# Patient Record
Sex: MALE | Race: BLACK OR AFRICAN AMERICAN | Employment: OTHER | URBAN - METROPOLITAN AREA
[De-identification: names, ages, dates, MRNs, and addresses within clinical notes are randomized per-mention and may not be internally consistent; named-entity substitution may affect disease eponyms.]

---

## 2017-03-27 DIAGNOSIS — I10 ESSENTIAL HYPERTENSION WITH GOAL BLOOD PRESSURE LESS THAN 140/90: ICD-10-CM

## 2017-03-27 NOTE — TELEPHONE ENCOUNTER
Requested Prescriptions     Pending Prescriptions Disp Refills    lisinopril (PRINIVIL, ZESTRIL) 20 mg tablet 135 Tab 0     Sig: Take one and a half tablet daily    hydroCHLOROthiazide (HYDRODIURIL) 12.5 mg tablet 90 Tab 1     Sig: TAKE 1 TABLET BY MOUTH DAILY FOR HIGH BLOOD PRESSURE

## 2017-03-29 DIAGNOSIS — I10 ESSENTIAL HYPERTENSION WITH GOAL BLOOD PRESSURE LESS THAN 140/90: ICD-10-CM

## 2017-03-29 RX ORDER — LISINOPRIL 20 MG/1
TABLET ORAL
Qty: 135 TAB | Refills: 0 | OUTPATIENT
Start: 2017-03-29

## 2017-03-29 NOTE — TELEPHONE ENCOUNTER
Patient states he needs these refills approved today as he requested this on Monday, 3/27/17. Patient states he needs a call when this is done to the pharmacy. Please call.  Thank you

## 2017-03-29 NOTE — TELEPHONE ENCOUNTER
Pt has been out of medication and ask if these can be sent to the pharmacy as soon as possible this morning?

## 2017-03-30 RX ORDER — LISINOPRIL 20 MG/1
TABLET ORAL
Qty: 135 TAB | Refills: 1 | Status: SHIPPED | OUTPATIENT
Start: 2017-03-30 | End: 2018-08-29 | Stop reason: SDUPTHER

## 2017-03-30 RX ORDER — LISINOPRIL 20 MG/1
TABLET ORAL
Qty: 135 TAB | Refills: 0 | Status: SHIPPED | OUTPATIENT
Start: 2017-03-30 | End: 2017-03-30 | Stop reason: SDUPTHER

## 2017-03-30 RX ORDER — HYDROCHLOROTHIAZIDE 12.5 MG/1
TABLET ORAL
Qty: 90 TAB | Refills: 1 | Status: SHIPPED | OUTPATIENT
Start: 2017-03-30 | End: 2017-08-11 | Stop reason: SDUPTHER

## 2017-03-30 RX ORDER — HYDROCHLOROTHIAZIDE 12.5 MG/1
TABLET ORAL
Qty: 90 TAB | Refills: 3 | Status: SHIPPED | OUTPATIENT
Start: 2017-03-30 | End: 2017-08-11

## 2017-03-30 NOTE — TELEPHONE ENCOUNTER
Requested Prescriptions     Pending Prescriptions Disp Refills    lisinopril (PRINIVIL, ZESTRIL) 20 mg tablet 135 Tab 0     Sig: Take one and a half tablet daily     Refused Prescriptions Disp Refills    lisinopril (PRINIVIL, ZESTRIL) 20 mg tablet [Pharmacy Med Name: LISINOPRIL 20MG TABLETS] 135 Tab 0     Sig: TAKE 1 AND ONE-HALF TABLET BY MOUTH ONCE DAILY     Refused By: Dena Dyer     Reason for Refusal: other

## 2017-06-02 ENCOUNTER — APPOINTMENT (OUTPATIENT)
Dept: GENERAL RADIOLOGY | Age: 67
End: 2017-06-02
Attending: EMERGENCY MEDICINE
Payer: MEDICARE

## 2017-06-02 ENCOUNTER — HOSPITAL ENCOUNTER (EMERGENCY)
Age: 67
Discharge: HOME | End: 2017-06-02
Attending: EMERGENCY MEDICINE
Payer: MEDICARE

## 2017-06-02 VITALS
BODY MASS INDEX: 25.74 KG/M2 | TEMPERATURE: 98.6 F | DIASTOLIC BLOOD PRESSURE: 55 MMHG | WEIGHT: 183.86 LBS | SYSTOLIC BLOOD PRESSURE: 128 MMHG | RESPIRATION RATE: 14 BRPM | OXYGEN SATURATION: 99 % | HEART RATE: 48 BPM | HEIGHT: 71 IN

## 2017-06-02 DIAGNOSIS — R10.13 ABDOMINAL PAIN, EPIGASTRIC: ICD-10-CM

## 2017-06-02 DIAGNOSIS — R07.89 OTHER CHEST PAIN: Primary | ICD-10-CM

## 2017-06-02 DIAGNOSIS — R10.13 DYSPEPSIA: ICD-10-CM

## 2017-06-02 LAB
ALBUMIN SERPL BCP-MCNC: 3.7 G/DL (ref 3.5–5)
ALBUMIN/GLOB SERPL: 1.1 {RATIO} (ref 1.1–2.2)
ALP SERPL-CCNC: 79 U/L (ref 45–117)
ALT SERPL-CCNC: 20 U/L (ref 12–78)
ANION GAP BLD CALC-SCNC: 8 MMOL/L (ref 5–15)
APPEARANCE UR: CLEAR
AST SERPL W P-5'-P-CCNC: 13 U/L (ref 15–37)
ATRIAL RATE: 78 BPM
BACTERIA URNS QL MICRO: NEGATIVE /HPF
BASOPHILS # BLD AUTO: 0 K/UL (ref 0–0.1)
BASOPHILS # BLD: 0 % (ref 0–1)
BILIRUB SERPL-MCNC: 0.3 MG/DL (ref 0.2–1)
BILIRUB UR QL: NEGATIVE
BUN SERPL-MCNC: 30 MG/DL (ref 6–20)
BUN/CREAT SERPL: 21 (ref 12–20)
CALCIUM SERPL-MCNC: 9.2 MG/DL (ref 8.5–10.1)
CALCULATED P AXIS, ECG09: 65 DEGREES
CALCULATED R AXIS, ECG10: 2 DEGREES
CALCULATED T AXIS, ECG11: 38 DEGREES
CHLORIDE SERPL-SCNC: 108 MMOL/L (ref 97–108)
CK SERPL-CCNC: 189 U/L (ref 39–308)
CK SERPL-CCNC: 214 U/L (ref 39–308)
CO2 SERPL-SCNC: 24 MMOL/L (ref 21–32)
COLOR UR: NORMAL
CREAT SERPL-MCNC: 1.44 MG/DL (ref 0.7–1.3)
DIAGNOSIS, 93000: NORMAL
EOSINOPHIL # BLD: 0.3 K/UL (ref 0–0.4)
EOSINOPHIL NFR BLD: 5 % (ref 0–7)
EPITH CASTS URNS QL MICRO: NORMAL /LPF
ERYTHROCYTE [DISTWIDTH] IN BLOOD BY AUTOMATED COUNT: 12.5 % (ref 11.5–14.5)
GLOBULIN SER CALC-MCNC: 3.4 G/DL (ref 2–4)
GLUCOSE SERPL-MCNC: 97 MG/DL (ref 65–100)
GLUCOSE UR STRIP.AUTO-MCNC: NEGATIVE MG/DL
HCT VFR BLD AUTO: 33.2 % (ref 36.6–50.3)
HGB BLD-MCNC: 11.1 G/DL (ref 12.1–17)
HGB UR QL STRIP: NEGATIVE
HYALINE CASTS URNS QL MICRO: NORMAL /LPF (ref 0–5)
KETONES UR QL STRIP.AUTO: NEGATIVE MG/DL
LEUKOCYTE ESTERASE UR QL STRIP.AUTO: NEGATIVE
LIPASE SERPL-CCNC: 190 U/L (ref 73–393)
LYMPHOCYTES # BLD AUTO: 45 % (ref 12–49)
LYMPHOCYTES # BLD: 3.1 K/UL (ref 0.8–3.5)
MCH RBC QN AUTO: 32.9 PG (ref 26–34)
MCHC RBC AUTO-ENTMCNC: 33.4 G/DL (ref 30–36.5)
MCV RBC AUTO: 98.5 FL (ref 80–99)
MONOCYTES # BLD: 0.7 K/UL (ref 0–1)
MONOCYTES NFR BLD AUTO: 10 % (ref 5–13)
NEUTS SEG # BLD: 2.7 K/UL (ref 1.8–8)
NEUTS SEG NFR BLD AUTO: 40 % (ref 32–75)
NITRITE UR QL STRIP.AUTO: NEGATIVE
P-R INTERVAL, ECG05: 156 MS
PH UR STRIP: 5 [PH] (ref 5–8)
PLATELET # BLD AUTO: 243 K/UL (ref 150–400)
POTASSIUM SERPL-SCNC: 3.8 MMOL/L (ref 3.5–5.1)
PROT SERPL-MCNC: 7.1 G/DL (ref 6.4–8.2)
PROT UR STRIP-MCNC: NEGATIVE MG/DL
Q-T INTERVAL, ECG07: 384 MS
QRS DURATION, ECG06: 110 MS
QTC CALCULATION (BEZET), ECG08: 437 MS
RBC # BLD AUTO: 3.37 M/UL (ref 4.1–5.7)
RBC #/AREA URNS HPF: NORMAL /HPF (ref 0–5)
SODIUM SERPL-SCNC: 140 MMOL/L (ref 136–145)
SP GR UR REFRACTOMETRY: 1.02 (ref 1–1.03)
TROPONIN I SERPL-MCNC: <0.04 NG/ML
TROPONIN I SERPL-MCNC: <0.04 NG/ML
UA: UC IF INDICATED,UAUC: NORMAL
UROBILINOGEN UR QL STRIP.AUTO: 0.2 EU/DL (ref 0.2–1)
VENTRICULAR RATE, ECG03: 78 BPM
WBC # BLD AUTO: 6.7 K/UL (ref 4.1–11.1)
WBC URNS QL MICRO: NORMAL /HPF (ref 0–4)

## 2017-06-02 PROCEDURE — 84484 ASSAY OF TROPONIN QUANT: CPT | Performed by: EMERGENCY MEDICINE

## 2017-06-02 PROCEDURE — 83690 ASSAY OF LIPASE: CPT | Performed by: EMERGENCY MEDICINE

## 2017-06-02 PROCEDURE — 99285 EMERGENCY DEPT VISIT HI MDM: CPT

## 2017-06-02 PROCEDURE — 82550 ASSAY OF CK (CPK): CPT | Performed by: EMERGENCY MEDICINE

## 2017-06-02 PROCEDURE — 80053 COMPREHEN METABOLIC PANEL: CPT | Performed by: EMERGENCY MEDICINE

## 2017-06-02 PROCEDURE — 81001 URINALYSIS AUTO W/SCOPE: CPT | Performed by: EMERGENCY MEDICINE

## 2017-06-02 PROCEDURE — 36415 COLL VENOUS BLD VENIPUNCTURE: CPT | Performed by: EMERGENCY MEDICINE

## 2017-06-02 PROCEDURE — 71010 XR CHEST PORT: CPT

## 2017-06-02 PROCEDURE — 93005 ELECTROCARDIOGRAM TRACING: CPT

## 2017-06-02 PROCEDURE — 85025 COMPLETE CBC W/AUTO DIFF WBC: CPT | Performed by: EMERGENCY MEDICINE

## 2017-06-02 RX ORDER — OMEPRAZOLE 40 MG/1
40 CAPSULE, DELAYED RELEASE ORAL DAILY
Qty: 20 CAP | Refills: 0 | Status: SHIPPED | OUTPATIENT
Start: 2017-06-02 | End: 2017-08-11 | Stop reason: ALTCHOICE

## 2017-06-02 NOTE — ED PROVIDER NOTES
HPI Comments: Nasir Walters is a 69 y.o. Male, with a pertinent PMHx of HTN, who presents ambulatory to the ED c/o left sided chest pressure since 8AM yesterday. Pt notes associated symptoms of tingling of his R fingers but reports he had experienced this before and nothing provides relief or exacerbates it. The pt states his CP lasts for 30 minutes at a time and now it feels similar to indigestion. He states the sensation is similar to an incomplete belch. The pt reports a h/o a bulging disc in his lower back. Pt denies a h/o carpal tunnel but does not some repetitive work with his hands. He denies h/o pancreatitis. Pt specifically denies SOB. 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 He wondered if some of this was from his fall. Social hx: + Tobacco use, + EtOH use, - Illicit drug use    PCP: Jagdeep Colby MD  Cardiology: None    There are no other complaints, changes or physical findings at this time. The history is provided by the patient. No  was used. Past Medical History:   Diagnosis Date    Arthritis     DJD (degenerative joint disease) of hip     Hypertension        Past Surgical History:   Procedure Laterality Date    HX ORTHOPAEDIC  2004    Total R hip replacement         Family History:   Problem Relation Age of Onset    Hypertension Mother     Coronary Artery Disease Maternal Grandmother     Coronary Artery Disease Maternal Grandfather     Hypertension Sister        Social History     Social History    Marital status: SINGLE     Spouse name: N/A    Number of children: N/A    Years of education: N/A     Occupational History    Not on file. Social History Main Topics    Smoking status: Current Every Day Smoker     Packs/day: 1.00     Years: 20.00    Smokeless tobacco: Never Used    Alcohol use Yes      Comment: occasionally cachorro    Drug use:  No  Sexual activity: Not on file     Other Topics Concern    Not on file     Social History Narrative         ALLERGIES: Review of patient's allergies indicates no known allergies. Review of Systems   Constitutional: Negative for chills and fever. HENT: Negative. Negative for congestion, rhinorrhea, sneezing and sore throat. Eyes: Negative. Negative for redness and visual disturbance. Respiratory: Negative. Negative for cough, shortness of breath and wheezing. Cardiovascular: Positive for chest pain (L). Negative for leg swelling. Gastrointestinal: Negative. Negative for abdominal pain, diarrhea, nausea and vomiting. Genitourinary: Negative. Negative for difficulty urinating, discharge and frequency. Musculoskeletal: Negative. Negative for arthralgias, back pain, myalgias and neck stiffness. Skin: Negative. Negative for color change and rash. Neurological: Negative. Negative for dizziness, syncope, weakness, numbness and headaches. +tingling R fingers   Hematological: Negative for adenopathy. Psychiatric/Behavioral: Negative. All other systems reviewed and are negative. Patient Vitals for the past 12 hrs:   Temp Pulse Resp BP SpO2   06/02/17 0525 - (!) 48 14 - 99 %   06/02/17 0500 - (!) 53 14 128/55 100 %   06/02/17 0445 - (!) 55 13 113/56 99 %   06/02/17 0430 - (!) 54 12 115/62 98 %   06/02/17 0415 - (!) 53 17 107/57 99 %   06/02/17 0400 - (!) 52 16 117/60 98 %   06/02/17 0345 - (!) 50 15 103/63 98 %   06/02/17 0330 - (!) 59 16 109/58 99 %   06/02/17 0315 - 60 15 128/78 99 %   06/02/17 0300 - (!) 59 16 113/66 99 %   06/02/17 0245 - 62 13 115/74 99 %   06/02/17 0230 - 62 13 125/70 99 %   06/02/17 0215 - (!) 59 14 125/70 99 %   06/02/17 0200 - 76 15 140/68 99 %   06/02/17 0157 - - - - 99 %   06/02/17 0156 - - - 139/67 -   06/02/17 0151 98.6 °F (37 °C) 72 14 139/67 99 %            Physical Exam   Constitutional: He is oriented to person, place, and time.    HENT:   Head: Atraumatic. Eyes: EOM are normal.   Cardiovascular: Normal rate, regular rhythm, normal heart sounds and intact distal pulses. Exam reveals no gallop and no friction rub. No murmur heard. Pulmonary/Chest: Effort normal and breath sounds normal. No respiratory distress. He has no wheezes. He has no rales. He exhibits no tenderness. Abdominal: Soft. Bowel sounds are normal. He exhibits no distension and no mass. There is no tenderness. There is no rebound and no guarding. Musculoskeletal: Normal range of motion. He exhibits no edema or tenderness. Negative Tinnels, Mild positive phalens   Neurological: He is alert and oriented to person, place, and time. Psychiatric: He has a normal mood and affect. Nursing note and vitals reviewed. MDM  Number of Diagnoses or Management Options  Abdominal pain, epigastric:   Dyspepsia:   Other chest pain:   Diagnosis management comments: DDx: Lower suspicion for ACS, acute MI, as presentation slightly atypical, however willl obtain a two set cardiac enzyme rule out; pt also ttp over epigastric region so gastritis, PUD, symptomatic GERD will also be considered, 2 set cardiac enzyme will be performed       Amount and/or Complexity of Data Reviewed  Clinical lab tests: ordered and reviewed  Tests in the radiology section of CPT®: ordered and reviewed  Tests in the medicine section of CPT®: ordered and reviewed  Review and summarize past medical records: yes  Independent visualization of images, tracings, or specimens: yes    Patient Progress  Patient progress: stable    ED Course       Procedures    EKG interpretation: (Preliminary) 1:48 AM  Rhythm: normal sinus rhythm; and regular . Rate (approx.): 78 bpm; Axis: normal; CA interval: normal; QRS interval: normal ; ST/T wave: normal; Other findings: normal ECG. This note is prepared by Patito Martinez, acting as Scribe for Romina Ryder MD.    PROGRESS NOTE:  4:04 AM  Pt has been re-evaluated.  Romina Ryder MD will repeat enzymes at 4:30 AM.    PROGRESS NOTE:  5:34 AM  Pt has been re-evaluated. Patient is asymptomatic at time of discharge. LABORATORY TESTS:  Recent Results (from the past 12 hour(s))   EKG, 12 LEAD, INITIAL    Collection Time: 06/02/17  1:48 AM   Result Value Ref Range    Ventricular Rate 78 BPM    Atrial Rate 78 BPM    P-R Interval 156 ms    QRS Duration 110 ms    Q-T Interval 384 ms    QTC Calculation (Bezet) 437 ms    Calculated P Axis 65 degrees    Calculated R Axis 2 degrees    Calculated T Axis 38 degrees    Diagnosis       Normal sinus rhythm  Normal ECG  When compared with ECG of 15-APR-2014 14:05,  Nonspecific T wave abnormality, improved in Lateral leads     CBC WITH AUTOMATED DIFF    Collection Time: 06/02/17  2:15 AM   Result Value Ref Range    WBC 6.7 4.1 - 11.1 K/uL    RBC 3.37 (L) 4.10 - 5.70 M/uL    HGB 11.1 (L) 12.1 - 17.0 g/dL    HCT 33.2 (L) 36.6 - 50.3 %    MCV 98.5 80.0 - 99.0 FL    MCH 32.9 26.0 - 34.0 PG    MCHC 33.4 30.0 - 36.5 g/dL    RDW 12.5 11.5 - 14.5 %    PLATELET 099 492 - 985 K/uL    NEUTROPHILS 40 32 - 75 %    LYMPHOCYTES 45 12 - 49 %    MONOCYTES 10 5 - 13 %    EOSINOPHILS 5 0 - 7 %    BASOPHILS 0 0 - 1 %    ABS. NEUTROPHILS 2.7 1.8 - 8.0 K/UL    ABS. LYMPHOCYTES 3.1 0.8 - 3.5 K/UL    ABS. MONOCYTES 0.7 0.0 - 1.0 K/UL    ABS. EOSINOPHILS 0.3 0.0 - 0.4 K/UL    ABS.  BASOPHILS 0.0 0.0 - 0.1 K/UL   METABOLIC PANEL, COMPREHENSIVE    Collection Time: 06/02/17  2:15 AM   Result Value Ref Range    Sodium 140 136 - 145 mmol/L    Potassium 3.8 3.5 - 5.1 mmol/L    Chloride 108 97 - 108 mmol/L    CO2 24 21 - 32 mmol/L    Anion gap 8 5 - 15 mmol/L    Glucose 97 65 - 100 mg/dL    BUN 30 (H) 6 - 20 MG/DL    Creatinine 1.44 (H) 0.70 - 1.30 MG/DL    BUN/Creatinine ratio 21 (H) 12 - 20      GFR est AA 59 (L) >60 ml/min/1.73m2    GFR est non-AA 49 (L) >60 ml/min/1.73m2    Calcium 9.2 8.5 - 10.1 MG/DL    Bilirubin, total 0.3 0.2 - 1.0 MG/DL    ALT (SGPT) 20 12 - 78 U/L    AST (SGOT) 13 (L) 15 - 37 U/L    Alk. phosphatase 79 45 - 117 U/L    Protein, total 7.1 6.4 - 8.2 g/dL    Albumin 3.7 3.5 - 5.0 g/dL    Globulin 3.4 2.0 - 4.0 g/dL    A-G Ratio 1.1 1.1 - 2.2     URINALYSIS W/ REFLEX CULTURE    Collection Time: 06/02/17  2:15 AM   Result Value Ref Range    Color YELLOW/STRAW      Appearance CLEAR CLEAR      Specific gravity 1.016 1.003 - 1.030      pH (UA) 5.0 5.0 - 8.0      Protein NEGATIVE  NEG mg/dL    Glucose NEGATIVE  NEG mg/dL    Ketone NEGATIVE  NEG mg/dL    Bilirubin NEGATIVE  NEG      Blood NEGATIVE  NEG      Urobilinogen 0.2 0.2 - 1.0 EU/dL    Nitrites NEGATIVE  NEG      Leukocyte Esterase NEGATIVE  NEG      UA:UC IF INDICATED CULTURE NOT INDICATED BY UA RESULT CNI      WBC 0-4 0 - 4 /hpf    RBC 0-5 0 - 5 /hpf    Epithelial cells FEW FEW /lpf    Bacteria NEGATIVE  NEG /hpf    Hyaline cast 2-5 0 - 5 /lpf   TROPONIN I    Collection Time: 06/02/17  2:15 AM   Result Value Ref Range    Troponin-I, Qt. <0.04 <0.05 ng/mL   CK W/ REFLX CKMB    Collection Time: 06/02/17  2:15 AM   Result Value Ref Range     39 - 308 U/L   TROPONIN I    Collection Time: 06/02/17  4:23 AM   Result Value Ref Range    Troponin-I, Qt. <0.04 <0.05 ng/mL   CK W/ REFLX CKMB    Collection Time: 06/02/17  4:23 AM   Result Value Ref Range     39 - 308 U/L   LIPASE    Collection Time: 06/02/17  4:23 AM   Result Value Ref Range    Lipase 190 73 - 393 U/L       IMAGING RESULTS:  CXR Results  (Last 48 hours)               06/02/17 0229  XR CHEST PORT Final result    Impression:  Impression: No acute process. Narrative: Indication: Left-sided chest pain since yesterday       Comparison: 4/15/2014       Portable exam of the chest obtained at 221 demonstrates normal heart size. There   is no acute process in the lung fields. The osseous structures are unremarkable. IMPRESSION:  1. Other chest pain    2. Abdominal pain, epigastric    3. Dyspepsia        PLAN:  1.  Discharge home  Current Discharge Medication List      START taking these medications    Details   omeprazole (PRILOSEC) 40 mg capsule Take 1 Cap by mouth daily. Qty: 20 Cap, Refills: 0           2. Follow-up Information     Follow up With Details Comments Contact Shaggy Austin MD In 3 days  [address] [facility]  [address] [facility] [address]  [address] [facility] [address]      Blake Knowles MD Call today and make an appointment for early next week for possible Stress Test [address] [facility]  [phone]      [facility] EMERGENCY DEPT  As needed, If symptoms worsen [address]  [address] [facility]  [phone]        Return to ED if worse     DISCHARGE NOTE  5:38 AM  The patient has been re-evaluated and is ready for discharge. Reviewed available results with patient. Counseled patient on diagnosis and care plan. Patient has expressed understanding, and all questions have been answered. Patient agrees with plan and agrees to follow up as recommended, or return to the ED if their symptoms worsen. Discharge instructions have been provided and explained to the patient, along with reasons to return to the ED. ATTESTATION:  This note is prepared by Miriam Wall, acting as Scribe for Faith Hoko MD.    Faith Hook MD: The scribe's documentation has been prepared under my direction and personally reviewed by me in its entirety. I confirm that the note above accurately reflects all work, treatment, procedures, and medical decision making performed by me.

## 2017-06-02 NOTE — DISCHARGE INSTRUCTIONS
Indigestion (Dyspepsia or Heartburn): Care Instructions  Your Care Instructions  Sometimes it can be hard to pinpoint the cause of indigestion (dyspepsia or heartburn). Most cases of an upset stomach with bloating, burning, burping, and nausea are minor and go away within several hours. Home treatment and over-the-counter medicine often are able to control symptoms. But if you take medicine to relieve your indigestion without making diet and lifestyle changes, your symptoms are likely to return again and again. If you get indigestion often, it may be a sign of a more serious medical problem. Be sure to follow up with your doctor, who may want to do tests to be sure of the cause of your indigestion. Follow-up care is a key part of your treatment and safety. Be sure to make and go to all appointments, and call your doctor if you are having problems. It's also a good idea to know your test results and keep a list of the medicines you take. How can you care for yourself at home? · Your doctor may recommend over-the-counter medicine. For mild or occasional indigestion, antacids such as Tums, Gaviscon, Mylanta, or Maalox may help. Be careful when you take over-the-counter antacid medicines. Many of these medicines have aspirin in them. Read the label to make sure that you are not taking more than the recommended dose. Too much aspirin can be harmful. · Your doctor also may recommend over-the-counter acid reducers, such as Pepcid AC, Tagamet HB, Zantac 75, or Prilosec. Read and follow all instructions on the label. If you use these medicines often, talk with your doctor. · Change your eating habits. ¨ It's best to eat several small meals instead of two or three large meals. ¨ After you eat, wait 2 to 3 hours before you lie down. ¨ Chocolate, mint, and alcohol can make GERD worse.   ¨ Spicy foods, foods that have a lot of acid (like tomatoes and oranges), and coffee can make GERD symptoms worse in some people. If your symptoms are worse after you eat a certain food, you may want to stop eating that food to see if your symptoms get better. · Do not smoke or chew tobacco. Smoking can make GERD worse. If you need help quitting, talk to your doctor about stop-smoking programs and medicines. These can increase your chances of quitting for good. · If you have GERD symptoms at night, raise the head of your bed 6 to 8 inches by putting the frame on blocks or placing a foam wedge under the head of your mattress. (Adding extra pillows does not work.)  · Do not wear tight clothing around your middle. · Lose weight if you need to. Losing just 5 to 10 pounds can help. · Do not take anti-inflammatory medicines, such as aspirin, ibuprofen (Advil, Motrin), or naproxen (Aleve). These can irritate the stomach. If you need a pain medicine, try acetaminophen (Tylenol), which does not cause stomach upset. When should you call for help? Call 911 anytime you think you may need emergency care. For example, call if:  · You passed out (lost consciousness). · You vomit blood or what looks like coffee grounds. · You pass maroon or very bloody stools. · You have chest pain or pressure. This may occur with:  ¨ Sweating. ¨ Shortness of breath. ¨ Nausea or vomiting. ¨ Pain that spreads from the chest to the neck, jaw, or one or both shoulders or arms. ¨ Feeling dizzy or lightheaded. ¨ A fast or uneven pulse. After calling 911, chew 1 adult-strength aspirin. Wait for an ambulance. Do not try to drive yourself. Call your doctor now or seek immediate medical care if:  · You have severe belly pain. · Your stools are black and tarlike or have streaks of blood. · You have trouble swallowing. · You are losing weight and do not know why. Watch closely for changes in your health, and be sure to contact your doctor if:  · You do not get better as expected. Where can you learn more?   Go to http://darryl-titus.info/. Enter E605 in the search box to learn more about \"Indigestion (Dyspepsia or Heartburn): Care Instructions. \"  Current as of: November 16, 2016  Content Version: 11.2  © 8435-8669 [employer] Care instructions adapted under license by [employer] (which disclaims liability or warranty for this information). If you have questions about a medical condition or this instruction, always ask your healthcare professional. [place] [address] any warranty or liability for your use of this information. Abdominal Pain: Care Instructions  Your Care Instructions    Abdominal pain has many possible causes. Some aren't serious and get better on their own in a few days. Others need more testing and treatment. If your pain continues or gets worse, you need to be rechecked and may need more tests to find out what is wrong. You may need surgery to correct the problem. Don't ignore new symptoms, such as fever, nausea and vomiting, urination problems, pain that gets worse, and dizziness. These may be signs of a more serious problem. Your doctor may have recommended a follow-up visit in the next 8 to 12 hours. If you are not getting better, you may need more tests or treatment. The doctor has checked you carefully, but problems can develop later. If you notice any problems or new symptoms, get medical treatment right away. Follow-up care is a key part of your treatment and safety. Be sure to make and go to all appointments, and call your doctor if you are having problems. It's also a good idea to know your test results and keep a list of the medicines you take. How can you care for yourself at home? · Rest until you feel better. · To prevent dehydration, drink plenty of fluids, enough so that your urine is light yellow or clear like water. Choose water and other caffeine-free clear liquids until you feel better.  If you have kidney, heart, or liver disease and have to limit fluids, talk with your doctor before you increase the amount of fluids you drink. · If your stomach is upset, eat mild foods, such as rice, dry toast or crackers, bananas, and applesauce. Try eating several small meals instead of two or three large ones. · Wait until 48 hours after all symptoms have gone away before you have spicy foods, alcohol, and drinks that contain caffeine. · Do not eat foods that are high in fat. · Avoid anti-inflammatory medicines such as aspirin, ibuprofen (Advil, Motrin), and naproxen (Aleve). These can cause stomach upset. Talk to your doctor if you take daily aspirin for another health problem. When should you call for help? Call 911 anytime you think you may need emergency care. For example, call if:  · You passed out (lost consciousness). · You pass maroon or very bloody stools. · You vomit blood or what looks like coffee grounds. · You have new, severe belly pain. Call your doctor now or seek immediate medical care if:  · Your pain gets worse, especially if it becomes focused in one area of your belly. · You have a new or higher fever. · Your stools are black and look like tar, or they have streaks of blood. · You have unexpected vaginal bleeding. · You have symptoms of a urinary tract infection. These may include:  ¨ Pain when you urinate. ¨ Urinating more often than usual.  ¨ Blood in your urine. · You are dizzy or lightheaded, or you feel like you may faint. Watch closely for changes in your health, and be sure to contact your doctor if:  · You are not getting better after 1 day (24 hours). Where can you learn more? Go to http://darryl-titus.info/. Enter K522 in the search box to learn more about \"Abdominal Pain: Care Instructions. \"  Current as of: May 27, 2016  Content Version: 11.2  © 0364-3594 [employer].  Care instructions adapted under license by [employer] (which disclaims liability or warranty for this information). If you have questions about a medical condition or this instruction, always ask your healthcare professional. [address] any warranty or liability for your use of this information. Chest Pain: Care Instructions  Your Care Instructions  There are many things that can cause chest pain. Some are not serious and will get better on their own in a few days. But some kinds of chest pain need more testing and treatment. Your doctor may have recommended a follow-up visit in the next 8 to 12 hours. If you are not getting better, you may need more tests or treatment. Even though your doctor has released you, you still need to watch for any problems. The doctor carefully checked you, but sometimes problems can develop later. If you have new symptoms or if your symptoms do not get better, get medical care right away. If you have worse or different chest pain or pressure that lasts more than 5 minutes or you passed out (lost consciousness), call 911 or seek other emergency help right away. A medical visit is only one step in your treatment. Even if you feel better, you still need to do what your doctor recommends, such as going to all suggested follow-up appointments and taking medicines exactly as directed. This will help you recover and help prevent future problems. How can you care for yourself at home? · Rest until you feel better. · Take your medicine exactly as prescribed. Call your doctor if you think you are having a problem with your medicine. · Do not drive after taking a prescription pain medicine. When should you call for help? Call 911 if:  · You passed out (lost consciousness). · You have severe difficulty breathing. · You have symptoms of a heart attack. These may include:  ¨ Chest pain or pressure, or a strange feeling in your chest.  ¨ Sweating. ¨ Shortness of breath. ¨ Nausea or vomiting.   ¨ Pain, pressure, or a strange feeling in your back, neck, jaw, or upper belly or in one or both shoulders or arms. ¨ Lightheadedness or sudden weakness. ¨ A fast or irregular heartbeat. After you call 911, the  may tell you to chew 1 adult-strength or 2 to 4 low-dose aspirin. Wait for an ambulance. Do not try to drive yourself. Call your doctor today if:  · You have any trouble breathing. · Your chest pain gets worse. · You are dizzy or lightheaded, or you feel like you may faint. · You are not getting better as expected. · You are having new or different chest pain. Where can you learn more? Go to http://darryl-titus.info/. Enter A120 in the search box to learn more about \"Chest Pain: Care Instructions. \"  Current as of: May 27, 2016  Content Version: 11.2  © 4214-6801 [employer]. Care instructions adapted under license by [employer] (which disclaims liability or warranty for this information). If you have questions about a medical condition or this instruction, always ask your healthcare professional. [place] [address] any warranty or liability for your use of this information.

## 2017-06-02 NOTE — ED NOTES
Pt arrives via wheelchair to room. Pt c/o chest pain and epigastric pain off and on since thursday am. Pt denies n/v, sweating or shortness of breath. Monitor x 3. Call bell within reach and plan of care discussed. Dr. Marcelina Summers in to see pt.

## 2017-06-02 NOTE — ED NOTES
Assumed care of pt at this time, received bedside report from [facility] with pt included in care. Pt is resting in room, with call bell in reach. All questions answered.

## 2017-06-02 NOTE — ED NOTES
Discharge instructions reviewed with pt and copy given along with RX by MD. All questions answered at this time. Pt discharged ambulatory to home. Pt refused wheelchair.

## 2017-06-14 NOTE — TELEPHONE ENCOUNTER
Pt called requesting a refill for blood pressure medication Metoprolol 25 mg tabs. Pt stated he has only  2 tablets left. Pt use the [facility] on [facility] [address]. Pt best contact number is [phone].      Message received & copied from [facility]

## 2017-06-16 NOTE — TELEPHONE ENCOUNTER
Patient called to check status of refill request received on 6/14/17 on medication that patient states he's out of. Patient reminded of 48 hr turn around on refill request. Please approve as soon as possible.  Thank you

## 2017-06-16 NOTE — TELEPHONE ENCOUNTER
Patient called to check the status of his refill request received 6/14/17 being approved today. Please call to advise.  Thank you

## 2017-06-17 RX ORDER — METOPROLOL SUCCINATE 25 MG/1
TABLET, EXTENDED RELEASE ORAL
Qty: 90 TAB | Refills: 1 | Status: SHIPPED | OUTPATIENT
Start: 2017-06-17 | End: 2017-08-11 | Stop reason: SDUPTHER

## 2017-08-11 ENCOUNTER — OFFICE VISIT (OUTPATIENT)
Dept: CARDIOLOGY CLINIC | Age: 67
End: 2017-08-11

## 2017-08-11 VITALS
WEIGHT: 185.2 LBS | DIASTOLIC BLOOD PRESSURE: 80 MMHG | HEART RATE: 64 BPM | HEIGHT: 71 IN | RESPIRATION RATE: 16 BRPM | OXYGEN SATURATION: 98 % | BODY MASS INDEX: 25.93 KG/M2 | SYSTOLIC BLOOD PRESSURE: 150 MMHG

## 2017-08-11 DIAGNOSIS — I42.8 NICM (NONISCHEMIC CARDIOMYOPATHY) (HCC): ICD-10-CM

## 2017-08-11 DIAGNOSIS — R94.30 EJECTION FRACTION < 50%: ICD-10-CM

## 2017-08-11 DIAGNOSIS — I10 ESSENTIAL HYPERTENSION: Primary | ICD-10-CM

## 2017-08-11 RX ORDER — METOPROLOL SUCCINATE 50 MG/1
TABLET, EXTENDED RELEASE ORAL
Qty: 90 TAB | Refills: 3 | Status: SHIPPED | OUTPATIENT
Start: 2017-08-11 | End: 2017-08-11 | Stop reason: SDUPTHER

## 2017-08-11 RX ORDER — METOPROLOL SUCCINATE 50 MG/1
TABLET, EXTENDED RELEASE ORAL
Qty: 90 TAB | Refills: 3 | Status: SHIPPED | OUTPATIENT
Start: 2017-08-11 | End: 2018-05-16 | Stop reason: ALTCHOICE

## 2017-08-11 NOTE — PROGRESS NOTES
Patient C/O chest discomfort with tingling sensations seen @ ED advised to follow up with cardiology.

## 2017-08-11 NOTE — MR AVS SNAPSHOT
Visit Information Date & Time Provider Department Dept. Phone Encounter #  
 8/11/2017 10:00 AM Campos Curtis MD [facility] [phone] [id number] Follow-up Instructions Routing History Your Appointments 8/17/2017 10:00 AM  
ECHO CARDIOGRAMS 2D with [facility] [address] [place] [address]) Appt Note: $40CP 8/11/17ksr /per Dr R/2D ECHO COMPLETE ADULT (TTE) W OR WO CONTR [60065 CPT(R)]  
 [address]  
[phone] [address] Upcoming Health Maintenance Date Due Hepatitis C Screening 1950 DTaP/Tdap/Td series (1 - Tdap) 2/13/1969 FOBT Q 1 YEAR AGE 50-75 2/13/1998 ZOSTER VACCINE AGE 60> 12/13/2007 GLAUCOMA SCREENING Q2Y 2/13/2013 Pneumococcal 65+ Low/Medium Risk (2 of 2 - PCV13) 11/20/2015 MEDICARE YEARLY EXAM 3/8/2017 INFLUENZA AGE 9 TO ADULT 8/1/2017 Allergies as of 8/11/2017  Review Complete On: 8/11/2017 By: Ti Farris LPN No Known Allergies Current Immunizations  Never Reviewed Name Date Influenza Vaccine 3/7/2016, 11/20/2014 Pneumococcal Polysaccharide (PPSV-23) 11/20/2014 Not reviewed this visit You Were Diagnosed With   
  
 Codes Comments Essential hypertension    -  Primary ICD-10-CM: I10 
ICD-9-CM: 401.9 Ejection fraction < 50%     ICD-10-CM: R09.89 ICD-9-CM: 785.9 NICM (nonischemic cardiomyopathy) ([facility] [address]     ICD-10-CM: I42.8 ICD-9-CM: 425.4 Vitals BP Pulse Resp Height(growth percentile) Weight(growth percentile) SpO2  
 150/80 (BP 1 Location: Right arm, BP Patient Position: Sitting) 64 16 5' 11\" (1.803 m) 185 lb 3.2 oz (84 kg) 98% BMI Smoking Status 25.83 kg/m2 Current Every Day Smoker Vitals History BMI and BSA Data Body Mass Index Body Surface Area  
 25.83 kg/m 2 2.05 m 2 Preferred Pharmacy Pharmacy Name Phone [facility] [address] [facility] [phone] Your Updated Medication List  
  
   
This list is accurate as of: 8/11/17 11:02 AM.  Always use your most recent med list.  
  
  
  
  
 lisinopril 20 mg tablet Commonly known as:  Chandu Jose Take one and a half tablet daily  
  
 metoprolol succinate 50 mg XL tablet Commonly known as:  TOPROL-XL  
TAKE 1 TABLET BY MOUTH ONCE DAILY Increased 08/11/17 Prescriptions Sent to Pharmacy Refills  
 metoprolol succinate (TOPROL-XL) 50 mg XL tablet 3 Sig: TAKE 1 TABLET BY MOUTH ONCE DAILY Increased 08/11/17 Class: Normal  
 Pharmacy: [facility] [address] [place] [address] RD AT [address] [facility] #: [phone] We Performed the Following AMB POC EKG ROUTINE W/ 12 LEADS, INTER & REP [40087 CPT(R)] To-Do List   
 Around 08/11/2017 ECHO:  2D ECHO COMPLETE ADULT (TTE) W OR WO CONTR Introducing [facility]! [facility] introduces [employer] patient portal. Now you can access parts of your medical record, email your doctor's office, and request medication refills online. 1. In your internet browser, go to [email or web address] 2. Click on the First Time User? Click Here link in the Sign In box. You will see the New Member Sign Up page. 3. Enter your [employer] Access Code exactly as it appears below. You will not need to use this code after youve completed the sign-up process. If you do not sign up before the expiration date, you must request a new code. · [employer] Access Code: [email or web address] Expires: 8/31/2017  5:40 AM 
 
4. Enter the last four digits of your Social Security Number (xxxx) and Date of Birth (mm/dd/yyyy) as indicated and click Submit. You will be taken to the next sign-up page. 5. Create a [employer] ID. This will be your [employer] login ID and cannot be changed, so think of one that is secure and easy to remember. 6. Create a [employer] password. You can change your password at any time. 7. Enter your Password Reset Question and Answer. This can be used at a later time if you forget your password. 8. Enter your e-mail address. You will receive e-mail notification when new information is available in [address] 9. Click Sign Up. You can now view and download portions of your medical record. 10. Click the Download Summary menu link to download a portable copy of your medical information. If you have questions, please visit the Frequently Asked Questions section of the [employer] website. Remember, [employer] is NOT to be used for urgent needs. For medical emergencies, dial 911. Now available from your iPhone and Android! Please provide this summary of care documentation to your next provider. Your primary care clinician is listed as [facility]. If you have any questions after today's visit, please call [phone].

## 2017-08-11 NOTE — PROGRESS NOTES
NAME:  Grazyna Colunga   :   1950   MRN:   [id number]   PCP:  Marcina Mortimer, MD           Subjective: The patient is a 69y.o. year old male  who returns for a long  follow-up, last seen  . Since the last visit, patient reports no change in exercise tolerance,  edema, medication intolerance, palpitations, shortness of breath, PND/orthopnea wheezing, sputum, syncope, dizziness or light headedness. Was evaluated in ED recent for sudden sharp pain in his chest that occurred while in the shower. He has not had any pain of that nature since that time. Some tingling in his hands while in bed at night. Rare dyspnea. Frequent nocturia with feeling sudden need to void. Past Medical History:   Diagnosis Date    Arthritis     DJD (degenerative joint disease) of hip     Hypertension        Social History   Substance Use Topics    Smoking status: Current Every Day Smoker     Packs/day: 1.00     Years: 20.00    Smokeless tobacco: Never Used      Comment: doesn't inhale    Alcohol use Yes      Comment: occasionally cachorro      Family History   Problem Relation Age of Onset    Hypertension Mother     Coronary Artery Disease Maternal Grandmother     Coronary Artery Disease Maternal Grandfather     Hypertension Sister         Review of Systems  Constitutional: Negative for fever, chills, and diaphoresis. Respiratory: Negative for cough, hemoptysis, sputum production, shortness of breath and wheezing. Cardiovascular: Negative for chest pain, palpitations, orthopnea, claudication, leg swelling and PND. Gastrointestinal: Negative for heartburn, nausea, vomiting, blood in stool and melena. Genitourinary: Negative for dysuria and flank pain. Musculoskeletal: Negative for joint pain and back pain. Skin: Negative for rash. Neurological: Negative for focal weakness, seizures, loss of consciousness, weakness and headaches. Endo/Heme/Allergies: Does not bruise/bleed easily. Psychiatric/Behavioral: Negative for memory loss. The patient does not have insomnia. Objective:       Vitals:    08/11/17 1011 08/11/17 1012   BP: 160/80 150/80   Pulse: 64    Resp: 16    SpO2: 98%    Weight: 185 lb 3.2 oz (84 kg)    Height: 5' 11\" (1.803 m)     Body mass index is 25.83 kg/(m^2). General PE    Gen: NAD     Mental Status - Alert. General Appearance - Not in acute distress. Neck - no JVD     Chest and Lung Exam     Inspection: Accessory muscles - No use of accessory muscles in breathing. Auscultation:   Breath sounds: - Normal.     Cardiovascular   Inspection: Jugular vein - Bilateral - Inspection Normal.   Palpation/Percussion:   Apical Impulse: - Normal.   Auscultation: Rhythm - Regular. Heart Sounds - S1 WNL and S2 WNL. No S3 or S4. Murmurs & Other Heart Sounds: Auscultation of the heart reveals - No Murmurs. Peripheral Vascular   Upper Extremity: Inspection - Bilateral - No Cyanotic nailbeds or Digital clubbing. Lower Extremity:   Palpation: Edema - Bilateral - No edema. Abdomen: Soft, non-tender, bowel sounds are active. Neuro: A&O times 3, CN and motor grossly WNL      Data Review:     EKG -  Sinus  Rhythm   WITHIN NORMAL LIMITS    Allergies reviewed  No Known Allergies    Medications reviewed  Current Outpatient Prescriptions   Medication Sig    metoprolol succinate (TOPROL-XL) 50 mg XL tablet TAKE 1 TABLET BY MOUTH ONCE DAILY Increased 08/11/17    lisinopril (PRINIVIL, ZESTRIL) 20 mg tablet Take one and a half tablet daily     No current facility-administered medications for this visit. Assessment:       ICD-10-CM ICD-9-CM    1. Essential hypertension I10 401.9 AMB POC EKG ROUTINE W/ 12 LEADS, INTER & REP      2D ECHO COMPLETE ADULT (TTE) W OR WO CONTR   2. Ejection fraction < 50% R09.89 785.9 2D ECHO COMPLETE ADULT (TTE) W OR WO CONTR   3.  NICM (nonischemic cardiomyopathy) (HCC) I42.8 425.4 2D ECHO COMPLETE ADULT (TTE) W OR WO CONTR Orders Placed This Encounter    AMB POC EKG ROUTINE W/ 12 LEADS, INTER & REP     Order Specific Question:   Reason for Exam:     Answer:   routine    2D ECHO COMPLETE ADULT (TTE) W OR WO CONTR     Standing Status:   Future     Standing Expiration Date:   2/11/2018     Order Specific Question:   Reason for Exam:     Answer:   NICM, EF 45%, HTN     Order Specific Question:   Contrast Enhancement (Bubble Study, Definity, Optison) may be used if criteria listed in established evidence-based protocol has been identified. Answer: Yes    DISCONTD: metoprolol succinate (TOPROL-XL) 50 mg XL tablet     Sig: TAKE 1 TABLET BY MOUTH ONCE DAILY  Increased 08/11/17     Dispense:  90 Tab     Refill:  3    metoprolol succinate (TOPROL-XL) 50 mg XL tablet     Sig: TAKE 1 TABLET BY MOUTH ONCE DAILY Increased 08/11/17     Dispense:  90 Tab     Refill:  3       Patient Active Problem List   Diagnosis Code    Abnormal EKG R94.31    DJD (degenerative joint disease) of hip M16.9    Ejection fraction < 50% R09.89    HTN (hypertension) I10       Plan:     Patient presents for follow up. 1. NICM- normal coronaries per cath 2014. Last echo 1/15 with EF 45%. Repeat echo. 2. HTN - not controlled. Will stop HCTZ with recent elevation in Bun/creat on ED visit. Recommend follow up with PCP. Increase Toprol to 50mg. 3. Low hemoglobin. Refer to PCP for evaluation/follow up. Kathleen Hope, [address] [place] [address] Rd [facility]    8/12/2017         Agree with note as outlined by  NP. I confirm findings in history and physical exam. No additional findings noted. Agree with plan as outlined above.      [address] Jaylen Sewell MD

## 2017-08-17 ENCOUNTER — TELEPHONE (OUTPATIENT)
Dept: CARDIOLOGY CLINIC | Age: 67
End: 2017-08-17

## 2017-08-17 ENCOUNTER — CLINICAL SUPPORT (OUTPATIENT)
Dept: CARDIOLOGY CLINIC | Age: 67
End: 2017-08-17

## 2017-08-17 DIAGNOSIS — I10 ESSENTIAL HYPERTENSION: ICD-10-CM

## 2017-08-17 DIAGNOSIS — R94.30 EJECTION FRACTION < 50%: ICD-10-CM

## 2017-08-17 DIAGNOSIS — I42.8 NICM (NONISCHEMIC CARDIOMYOPATHY) (HCC): ICD-10-CM

## 2017-08-18 NOTE — TELEPHONE ENCOUNTER
Verified patient with two identifiers. Spoke with pt advising him Metoprolol was changed from 25 mg daily to 50 mg daily. Pt verbalized understanding. LISA Bowser LPN        Caller: Unspecified (Yesterday,  4:04 PM)                     90 days went to his pharmacy at the last visit.

## 2017-08-18 NOTE — PROGRESS NOTES
Please let pt know that his EF is estimated in the range of 55 % to 60 %. Better than previously known. Continue same.

## 2017-10-20 ENCOUNTER — HOSPITAL ENCOUNTER (EMERGENCY)
Age: 67
Discharge: HOME | End: 2017-10-20
Attending: FAMILY MEDICINE

## 2017-10-20 VITALS
RESPIRATION RATE: 20 BRPM | HEART RATE: 57 BPM | DIASTOLIC BLOOD PRESSURE: 87 MMHG | WEIGHT: 185 LBS | BODY MASS INDEX: 25.9 KG/M2 | OXYGEN SATURATION: 98 % | HEIGHT: 71 IN | TEMPERATURE: 98.2 F | SYSTOLIC BLOOD PRESSURE: 197 MMHG

## 2017-10-20 DIAGNOSIS — K21.9 GASTROESOPHAGEAL REFLUX DISEASE, ESOPHAGITIS PRESENCE NOT SPECIFIED: Primary | ICD-10-CM

## 2017-10-20 RX ORDER — LIDOCAINE HYDROCHLORIDE 20 MG/ML
15 SOLUTION OROPHARYNGEAL AS NEEDED
Status: DISCONTINUED | OUTPATIENT
Start: 2017-10-20 | End: 2017-10-20 | Stop reason: HOSPADM

## 2017-10-20 RX ORDER — OMEPRAZOLE 40 MG/1
40 CAPSULE, DELAYED RELEASE ORAL DAILY
Qty: 30 CAP | Refills: 0 | Status: SHIPPED | OUTPATIENT
Start: 2017-10-20 | End: 2017-10-20

## 2017-10-20 RX ORDER — MAG HYDROX/ALUMINUM HYD/SIMETH 200-200-20
30 SUSPENSION, ORAL (FINAL DOSE FORM) ORAL
Status: DISCONTINUED | OUTPATIENT
Start: 2017-10-20 | End: 2017-10-20 | Stop reason: HOSPADM

## 2017-10-20 RX ORDER — OMEPRAZOLE 40 MG/1
40 CAPSULE, DELAYED RELEASE ORAL DAILY
Qty: 90 CAP | Refills: 0 | Status: SHIPPED | OUTPATIENT
Start: 2017-10-20 | End: 2018-05-16 | Stop reason: ALTCHOICE

## 2017-10-20 RX ADMIN — LIDOCAINE HYDROCHLORIDE 15 ML: 20 SOLUTION OROPHARYNGEAL at 10:20

## 2017-10-20 RX ADMIN — Medication 30 ML: at 10:20

## 2017-10-20 NOTE — UC PROVIDER NOTE
Patient is a 69 y.o. male presenting with epigastric pain. The history is provided by the patient. Epigastric Pain    This is a recurrent problem. Episode onset: 1.5 weeks ago. The problem occurs constantly. The problem has not changed since onset. The pain is located in the epigastric region. The quality of the pain is aching. The pain is at a severity of 6/10. Associated symptoms include belching. Pertinent negatives include no fever, no diarrhea, no hematochezia, no melena, no nausea, no vomiting, no constipation, no headaches, no chest pain and no back pain. The pain is relieved by antacids. His past medical history is significant for GERD (improved with Prilosec 40 in the past). Past Medical History:   Diagnosis Date    Arthritis     DJD (degenerative joint disease) of hip     Hypertension         Past Surgical History:   Procedure Laterality Date    HX ORTHOPAEDIC  2004    Total R hip replacement         Family History   Problem Relation Age of Onset    Hypertension Mother     Coronary Artery Disease Maternal Grandmother     Coronary Artery Disease Maternal Grandfather     Hypertension Sister         Social History     Social History    Marital status: SINGLE     Spouse name: N/A    Number of children: N/A    Years of education: N/A     Occupational History    Not on file. Social History Main Topics    Smoking status: Current Every Day Smoker     Packs/day: 1.00     Years: 20.00    Smokeless tobacco: Never Used      Comment: doesn't inhale    Alcohol use Yes      Comment: occasionally cachorro    Drug use: No    Sexual activity: Not on file     Other Topics Concern    Not on file     Social History Narrative                ALLERGIES: Review of patient's allergies indicates no known allergies. Review of Systems   Constitutional: Negative for chills and fever. Respiratory: Negative for shortness of breath and wheezing. Cardiovascular: Negative for chest pain and palpitations. Gastrointestinal: Negative for constipation, diarrhea, hematochezia, melena, nausea and vomiting. Musculoskeletal: Negative for back pain. Skin: Negative for rash. Neurological: Negative for dizziness and headaches. Vitals:    10/20/17 0958   BP: 197/87   Pulse: (!) 57   Resp: 20   Temp: 98.2 °F (36.8 °C)   SpO2: 98%   Weight: 83.9 kg (185 lb)   Height: 5' 11\" (1.803 m)       Physical Exam   Constitutional: He appears well-developed and well-nourished. No distress. Cardiovascular: Normal rate, regular rhythm and normal heart sounds. Pulmonary/Chest: Effort normal and breath sounds normal. No respiratory distress. He has no wheezes. He has no rales. He exhibits no tenderness. Abdominal: Soft. Bowel sounds are normal. He exhibits no distension and no mass. There is tenderness in the epigastric area. There is no rebound and no guarding. Neurological: He is alert. Skin: He is not diaphoretic. Psychiatric: He has a normal mood and affect. His behavior is normal. Judgment and thought content normal.   Nursing note and vitals reviewed. MDM     Differential Diagnosis; Clinical Impression; Plan:     CLINICAL IMPRESSION:  Gastroesophageal reflux disease, esophagitis presence not specified  (primary encounter diagnosis)    Plan:  1. Improved with Mylanta + Lidocaine  2. Prilosec 40mg daily  3. F/u with gastroenterology  4. ER if worse  Risk of Significant Complications, Morbidity, and/or Mortality:   Presenting problems: Moderate  Diagnostic procedures: Moderate  Management options:   Moderate  Progress:   Patient progress:  Stable and improved      EKG  Date/Time: 10/20/2017 10:42 AM  Performed by: Leigh Mandel  Authorized by: Leigh Mandel     Previous ECG:     Previous ECG:  Compared to current  Interpretation:     Interpretation: non-specific    Rate:     ECG rate:  54    ECG rate assessment: bradycardic    Rhythm:     Rhythm: sinus rhythm    Ectopy:     Ectopy: PAC    QRS:     QRS axis:  Normal    QRS intervals:  Normal  Conduction:     Conduction: normal    ST segments:     ST segments:  Normal  T waves:     T waves: normal    Comments:      Incomplete RBBB

## 2017-10-20 NOTE — DISCHARGE INSTRUCTIONS

## 2017-10-23 LAB
ATRIAL RATE: 54 BPM
CALCULATED P AXIS, ECG09: 57 DEGREES
CALCULATED R AXIS, ECG10: -2 DEGREES
CALCULATED T AXIS, ECG11: 39 DEGREES
DIAGNOSIS, 93000: NORMAL
P-R INTERVAL, ECG05: 154 MS
Q-T INTERVAL, ECG07: 428 MS
QRS DURATION, ECG06: 98 MS
QTC CALCULATION (BEZET), ECG08: 405 MS
VENTRICULAR RATE, ECG03: 54 BPM

## 2018-05-16 ENCOUNTER — OFFICE VISIT (OUTPATIENT)
Dept: INTERNAL MEDICINE CLINIC | Age: 68
End: 2018-05-16

## 2018-05-16 VITALS
BODY MASS INDEX: 25.7 KG/M2 | HEART RATE: 62 BPM | RESPIRATION RATE: 18 BRPM | WEIGHT: 183.6 LBS | DIASTOLIC BLOOD PRESSURE: 74 MMHG | HEIGHT: 71 IN | OXYGEN SATURATION: 99 % | TEMPERATURE: 99 F | SYSTOLIC BLOOD PRESSURE: 176 MMHG

## 2018-05-16 DIAGNOSIS — Z12.11 SCREENING FOR COLON CANCER: ICD-10-CM

## 2018-05-16 DIAGNOSIS — Z13.39 SCREENING FOR ALCOHOLISM: ICD-10-CM

## 2018-05-16 DIAGNOSIS — Z13.820 ENCOUNTER FOR SCREENING FOR OSTEOPOROSIS: ICD-10-CM

## 2018-05-16 DIAGNOSIS — Z00.00 MEDICARE ANNUAL WELLNESS VISIT, SUBSEQUENT: Primary | ICD-10-CM

## 2018-05-16 DIAGNOSIS — R35.0 FREQUENCY OF MICTURITION: ICD-10-CM

## 2018-05-16 DIAGNOSIS — I10 ESSENTIAL HYPERTENSION: ICD-10-CM

## 2018-05-16 DIAGNOSIS — Z12.5 SCREENING FOR PROSTATE CANCER: ICD-10-CM

## 2018-05-16 RX ORDER — AMLODIPINE BESYLATE 5 MG/1
2.5 TABLET ORAL DAILY
Qty: 30 TAB | Refills: 1 | Status: SHIPPED | OUTPATIENT
Start: 2018-05-16 | End: 2018-05-16 | Stop reason: SDUPTHER

## 2018-05-16 RX ORDER — METOPROLOL SUCCINATE 50 MG/1
50 TABLET, EXTENDED RELEASE ORAL DAILY
Refills: 3 | COMMUNITY
Start: 2018-02-15 | End: 2018-11-06 | Stop reason: SDUPTHER

## 2018-05-16 NOTE — PROGRESS NOTES
Reviewed record in preparation for visit and have obtained necessary documentation. Identified pt with two pt identifiers(name and ). Chief Complaint   Patient presents with   [address] Maintenance Due   Topic Date Due    Hepatitis C Test  1950    DTaP/Tdap/Td  (1 - Tdap) 1969    Stool testing for trace blood  1998    Shingles Vaccine  2007    Pneumococcal Vaccine (2 of 2 - PCV13) 2015    Annual Well Visit  2018       Mr. Cindi Sherman has a reminder for a \"due or due soon\" health maintenance. I have asked that he discuss this further with his primary care provider for follow-up on this health maintenance. Coordination of Care Questionnaire:  :     1) Have you been to an emergency room, urgent care clinic since your last visit? No     Hospitalized since your last visit? no             2) Have you seen or consulted any other health care providers outside of [facility] since your last visit? no  (Include any pap smears or colon screenings in this section.)    3) In the event something were to happen to you and you were unable to speak on your behalf, do you have an Advance Directive/ Living Will in place stating your wishes? No     Do you have an Advance Directive on file? No     4) Are you interested in receiving information on Advance Directives? No     Patient is accompanied by self I have received verbal consent from Monisha Mckay to discuss any/all medical information while they are present in the room.

## 2018-05-16 NOTE — PATIENT INSTRUCTIONS
Medicare Part B Preventive Services Guidelines/Limitations Date last completed and Frequency Due Date   Bone Mass Measurement  (age 72 & older, biennial) Requires diagnosis related to osteoporosis or estrogen deficiency. Biennial benefit unless patient has history of long-term glucocorticoid tx or baseline is needed because initial test was by other method Completed:  Never done    Recommended every 2 years Due: order pended   Cardiovascular Screening Blood Tests (every 5 years)  Total cholesterol, HDL, Triglycerides Order as a panel if possible Completed:   03/07/2016  Recommended annually Due: order pended   Colorectal Cancer Screening  -Fecal occult blood test (annual)  -Flexible sigmoidoscopy (5y)  -Screening colonoscopy (10y)  -Barium Enema  Completed:  Patient states it has been more than 10 years     Recommended every 10 years  Due: referral given to Dr. Jasper Chavez to Prevent Tobacco Use (up to 8 sessions per year)  - Counseling greater than 3 and up to 10 minutes  - Counseling greater than 10 minutes Patients must be asymptomatic of tobacco-related conditions to receive as preventive service Current smoker    Diabetes Screening Tests (at least every 3 years, Medicare covers annually or at 6-month intervals for prediabetic patients)    Fasting blood sugar (FBS) or glucose tolerance test (GTT) Patient must be diagnosed with one of the following:  -Hypertension, Dyslipidemia, obesity, previous impaired FBS or GTT  Or any two of the following: overweight, FH of diabetes, age ? 72, history of gestational diabetes, birth of baby weighing more than 9 pounds Completed:   CMP 06/02/2017  Recommended every 3 years for non-diabetics    Recommended every 3-6 months for Pre-Diabetics and Diabetics Due: patient is not a diabetic, not due to recheck until 06/02/2020   Diabetes Self-Management Training (DSMT) (no USPSTF recommendation) Requires referral by treating physician for patient with diabetes or renal disease. 10 hours of initial DSMT session of no less than 30 minutes each in a continuous 12-month period. 2 hours of follow-up DSMT in subsequent years. NA    Glaucoma Screening (no USPSTF recommendation) Diabetes mellitus, family history, , age 48 or over,  American, age 72 or over Completed:  01/26/18  Recommended annually Due: patient states that he goes every 6 month, has appointment for June   Human Immunodeficiency Virus (HIV) Screening (annually for increased risk patients)  HIV-1 and HIV-2 by EIA, MARI, rapid antibody test, or oral mucosa transudate Patient must be at increased risk for HIV infection per USPSTF guidelines or pregnant. Tests covered annually for patients at increased risk. Pregnant patients may receive up to 3 test during pregnancy. NA    Medical Nutrition Therapy (MNT) (for diabetes or renal disease not recommended schedule) Requires referral by treating physician for patient with diabetes or renal disease. Can be provided in same year as diabetes self-management training (DSMT), and CMS recommends medical nutrition therapy take place after DSMT. Up to 3 hours for initial year and 2 hours in subsequent years.  NA    Prostate Cancer Screening (annually up to age 76)  - Digital rectal exam (DM)  - Prostate specific antigen (PSA) Annually (age 48 or over), DM not paid separately when covered E/M service is provided on same date Completed:  03/07/2016  Recommended annually to age 76 Due:  Order pended   Seasonal Influenza Vaccination (annually)  Completed:   09/28/2017  Recommended Annually Due: every fall   Pneumococcal Vaccination (once after 72)  Pneumococcal 23 -  Recommended once over the age of 72    Prevnar 15 - Recommended once/over the age of 72 Completed:    11/20/2014  Completed: 2017   Hepatitis B Vaccinations (if medium/high risk) Medium/high risk factors:  End-stage renal disease,  Hemophiliacs who received Factor VIII or IX concentrates, Clients of institutions for the mentally retarded, Persons who live in the same house as a HepB virus carrier, Homosexual men, Illicit injectable drug abusers. NA    Screening Mammography (biennial age 54-69)? Annually (age 36 or over) Completed: Not Applicable    Due: Not Applicable    Screening Pap Tests and Pelvic Examination (up to age 79 and after 79 if unknown history or abnormal study last 10 years) Every 25 months except high risk Completed: Not Applicable  Due: Not Applicable   Ultrasound Screening for Abdominal Aortic Aneurysm (AAA) (once) Patient must be referred through IPPE and not have had a screening for abdominal aortic aneurysm before under Medicare. Limited to patients who meet one of the following criteria:  - Men who are 73-68 years old and have smoked more than 100 cigarettes in their lifetime.  -Anyone with a FH of AAA  -Anyone recommended for screening by USPSTF NA        Advance Directives: After Your Visit  Your Care Instructions  An advance directive is a legal way to state your wishes at the end of your life. It tells your family and your doctor what to do if you can no longer say what you want. There are two main types of advance directives. You can change them any time that your wishes change. · A living will tells your family and your doctor your wishes about life support and other treatment. · A medical power of  lets you name a person to make treatment decisions for you when you can't speak for yourself. This person is called a health care agent. If you do not have an advance directive, decisions about your medical care may be made by a doctor or a  who doesn't know you. It may help to think of an advance directive as a gift to the people who care for you. If you have one, they won't have to make tough decisions by themselves. Follow-up care is a key part of your treatment and safety. Be sure to make and go to all appointments, and call your doctor if you are having problems. It's also a good idea to know your test results and keep a list of the medicines you take. How can you care for yourself at home? · Discuss your wishes with your loved ones and your doctor. This way, there are no surprises. · Many states have a unique form. Or you might use a universal form that has been approved by many states. This kind of form can sometimes be completed and stored online. Your electronic copy will then be available wherever you have a connection to the Internet. In most cases, doctors will respect your wishes even if you have a form from a different state. · You don't need a  to do an advance directive. But you may want to get legal advice. · Think about these questions when you prepare an advance directive:  ¨ Who do you want to make decisions about your medical care if you are not able to? Many people choose a family member, close friend, or doctor. ¨ Do you know enough about life support methods that might be used? If not, talk to your doctor so you understand. ¨ What are you most afraid of that might happen? You might be afraid of having pain, losing your independence, or being kept alive by machines. ¨ Where would you prefer to die? Choices include your home, a hospital, or a nursing home. ¨ Would you like to have information about hospice care to support you and your family? ¨ Do you want to donate organs when you die? ¨ Do you want certain Catholic practices performed before you die? If so, put your wishes in the advance directive. · Read your advance directive every year, and make changes as needed. When should you call for help? Be sure to contact your doctor if you have any questions. Where can you learn more? Go to [email or web address]  Enter R264 in the search box to learn more about \"Advance Directives: After Your Visit. \"   © 6175-0221 [employer].  Care instructions adapted under license by [employer] (which disclaims liability or warranty for this information). This care instruction is for use with your licensed healthcare professional. If you have questions about a medical condition or this instruction, always ask your healthcare professional. [address] any warranty or liability for your use of this information.   Content Version: 55.1.695054; Current as of: February 20, 2015
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
## 2018-05-16 NOTE — PROGRESS NOTES
This is the Subsequent Medicare Annual Wellness Exam, performed 12 months or more after the Initial AWV or the last Subsequent AWV    I have reviewed the patient's medical history in detail and updated the computerized patient record. History     Past Medical History:   Diagnosis Date    Arthritis     DJD (degenerative joint disease) of hip     Hypertension       Past Surgical History:   Procedure Laterality Date    HX ORTHOPAEDIC  2004    Total R hip replacement     Current Outpatient Prescriptions   Medication Sig Dispense Refill    metoprolol succinate (TOPROL-XL) 50 mg XL tablet Take 50 mg by mouth daily. 3    lisinopril (PRINIVIL, ZESTRIL) 20 mg tablet Take one and a half tablet daily 135 Tab 1     No Known Allergies  Family History   Problem Relation Age of Onset    Hypertension Mother     Coronary Artery Disease Maternal Grandmother     Coronary Artery Disease Maternal Grandfather     Hypertension Sister      Social History   Substance Use Topics    Smoking status: Current Every Day Smoker     Packs/day: 1.00     Years: 20.00    Smokeless tobacco: Never Used      Comment: doesn't inhale    Alcohol use Yes      Comment: occasionally cachorro     Patient Active Problem List   Diagnosis Code    Abnormal EKG R94.31    DJD (degenerative joint disease) of hip M16.9    Ejection fraction < 50% R94.30    HTN (hypertension) I10       Depression Risk Factor Screening:     PHQ over the last two weeks 5/16/2018   Little interest or pleasure in doing things Not at all   Feeling down, depressed or hopeless Not at all   Total Score PHQ 2 0     Alcohol Risk Factor Screening: On any occasion in the past three months you have had more than 7 drinks containing alcohol    Functional Ability and Level of Safety:   Hearing Loss  Hearing is good. Activities of Daily Living  The home contains: no safety equipment.   Patient does total self care  ADL Assessment 5/16/2018   Feeding yourself No Help Needed Getting from bed to chair No Help Needed   Getting dressed No Help Needed   Bathing or showering No Help Needed   Walk across the room (includes cane/walker) No Help Needed   Using the telphone No Help Needed   Taking your medications No Help Needed   Preparing meals No Help Needed   Managing money (expenses/bills) No Help Needed   Moderately strenuous housework (laundry) No Help Needed   Shopping for personal items (toiletries/medicines) No Help Needed   Shopping for groceries No Help Needed   Driving No Help Needed   Climbing a flight of stairs No Help Needed   Getting to places beyond walking distances No Help Needed       Fall Risk  Fall Risk Assessment, last 12 mths 5/16/2018   Able to walk? Yes   Fall in past 12 months? No       Abuse Screen  Patient is not abused    Cognitive Screening   Evaluation of Cognitive Function:  Has your family/caregiver stated any concerns about your memory: no  Normal    Patient Care Team   Patient Care Team:  Chapito Vargas MD as PCP - General (Family Practice)  Dennis Amaya MD (Ophthalmology)  Campos Curtis MD as Physician (Cardiology)    Assessment/Plan   Education and counseling provided:  Are appropriate based on today's review and evaluation  Pneumococcal Vaccine-complete in 2014 and 2017  Influenza Vaccine-09/28/17, patient is aware to get every fall  Prostate cancer screening tests (PSA, covered annually)-last completed 03/07/16, ordered today. Colorectal cancer screening tests-patient does not recall when last done, will refer to Dr. Florina Flood  Cardiovascular screening blood test-last done 03/07/2019, ordered lipid panel today. Bone mass measurement (DEXA)-patient states that he has never had this done, ordered today. Screening for glaucoma- patient states that he sees Dr. Chucho Chang) every 6 months  Diabetes screening test- last checked via CMP 06/02/2017, patient is not a diabetic. Diagnoses and all orders for this visit:    1. Medicare annual wellness visit, subsequent  -     Annual  Alcohol Screen 15 min ()    2. Screening for alcoholism  -     Annual  Alcohol Screen 15 min ()      Health Maintenance Due   Topic Date Due    Hepatitis C Screening  1950    DTaP/Tdap/Td series (1 - Tdap) 02/13/1969    FOBT Q 1 YEAR AGE 50-75  02/13/1998    ZOSTER VACCINE AGE 60>  12/13/2007    MEDICARE YEARLY EXAM  03/14/2018     ACP  Writer discussed with patient about an Advanced Medical Directive. Provided patient blank Advanced Medical Directive and 'Your Right to Decide' Booklet. Writer reviewed Advanced Medical Directive paperwork with patient with a brief description of how to complete the form. Requested that if document completed, to please provide a copy of completed Advanced Medical Directive to PCP office. Patient verbalized understanding.     I have reviewed the information regarding the Medicare Annual Well Visit as documented by Gulshan Mckeon LPN; Agree with assessment.  Darshan Umana MD

## 2018-05-16 NOTE — MR AVS SNAPSHOT
[address] [facility] 
[phone] Patient: Valerie Vasquez MRN: [id number] ONQ:9/99/2550 Visit Information Date & Time Provider Department Dept. Phone Encounter #  
 5/16/2018 12:00 PM Masha Najera, [address] [place] [address],[address] [phone] [id number] Follow-up Instructions Return in about 6 months (around 11/16/2018) for follow up htn. Upcoming Health Maintenance Date Due Hepatitis C Screening 1950 DTaP/Tdap/Td series (1 - Tdap) 2/13/1969 FOBT Q 1 YEAR AGE 50-75 2/13/1998 ZOSTER VACCINE AGE 60> 12/13/2007 MEDICARE YEARLY EXAM 3/14/2018 Influenza Age 5 to Adult 8/1/2018 GLAUCOMA SCREENING Q2Y 1/29/2020 Allergies as of 5/16/2018  Review Complete On: 5/16/2018 By: Mardene Schaumann, LPN No Known Allergies Current Immunizations  Never Reviewed Name Date Influenza High Dose Vaccine PF 9/28/2017 12:00 AM  
 Influenza Vaccine 3/7/2016, 11/20/2014 Pneumococcal Conjugate (PCV-13) 9/28/2017 12:00 AM  
 Pneumococcal Polysaccharide (PPSV-23) 11/20/2014 Not reviewed this visit You Were Diagnosed With   
  
 Codes Comments Medicare annual wellness visit, subsequent    -  Primary ICD-10-CM: Z00.00 ICD-9-CM: V70.0 Screening for alcoholism     ICD-10-CM: Z13.89 ICD-9-CM: V79.1 Essential hypertension     ICD-10-CM: I10 
ICD-9-CM: 401.9 Encounter for screening for osteoporosis     ICD-10-CM: Z13.820 ICD-9-CM: V82.81 Screening for colon cancer     ICD-10-CM: Z12.11 ICD-9-CM: V76.51 Screening for prostate cancer     ICD-10-CM: Z12.5 ICD-9-CM: V76.44 Frequency of micturition     ICD-10-CM: R35.0 ICD-9-CM: 788.41 Vitals BP Pulse Temp Resp Height(growth percentile) Weight(growth percentile) 176/74 (BP 1 Location: Left arm, BP Patient Position: Sitting) 62 99 °F (37.2 °C) (Oral) 18 5' 11\" (1.803 m) 183 lb 9.6 oz (83.3 kg) SpO2 BMI Smoking Status 99% 25.61 kg/m2 Current Every Day Smoker Vitals History BMI and BSA Data Body Mass Index Body Surface Area  
 25.61 kg/m 2 2.04 m 2 Preferred Pharmacy Pharmacy Name Phone [facility] [address] [facility] [phone] Your Updated Medication List  
  
   
This list is accurate as of 5/16/18  1:04 PM.  Always use your most recent med list. amLODIPine 5 mg tablet Commonly known as:  [place] Cordial Take 0.5 Tabs by mouth daily. lisinopril 20 mg tablet Commonly known as:  Latricia Hou Take one and a half tablet daily  
  
 metoprolol succinate 50 mg XL tablet Commonly known as:  TOPROL-XL Take 50 mg by mouth daily. Prescriptions Sent to Pharmacy Refills  
 amLODIPine (NORVASC) 5 mg tablet 1 Sig: Take 0.5 Tabs by mouth daily. Class: Normal  
 Pharmacy: [facility] [address] [place] [address] RD AT [address] [facility] Ph #: [phone] Route: Oral  
  
We Performed the Following LIPID PANEL [72508 CPT(R)] METABOLIC PANEL, COMPREHENSIVE [96147 CPT(R)] [place] ANNUAL ALCOHOL SCREEN 15 MIN [id number] [facility]] PSA, DIAGNOSTIC (PROSTATE SPECIFIC AG) [id number] CPT(R)] REFERRAL TO GASTROENTEROLOGY [RYB44 Custom] URINALYSIS W/ RFLX MICROSCOPIC [75094 CPT(R)] Follow-up Instructions Return in about 6 months (around 11/16/2018) for follow up htn. To-Do List   
 05/16/2018 Imaging:  DEXA BONE DENSITY STUDY AXIAL Referral Information Referral ID Referred By Referred To  
  
 [id number] [place], [address] [place], [address] [facility] Visits Status Start Date End Date 1 New Request 5/16/18 5/16/19  If your referral has a status of pending review or denied, additional information will be sent to support the outcome of this decision. Patient Instructions Medicare Part B Preventive Services Guidelines/Limitations Date last completed and Frequency Due Date Bone Mass Measurement 
(age 72 & older, biennial) Requires diagnosis related to osteoporosis or estrogen deficiency. Biennial benefit unless patient has history of long-term glucocorticoid tx or baseline is needed because initial test was by other method Completed: 
Never done Recommended every 2 years Due: order pended Cardiovascular Screening Blood Tests (every 5 years) Total cholesterol, HDL, Triglycerides Order as a panel if possible Completed: 
 03/07/2016 Recommended annually Due: order pended Colorectal Cancer Screening 
-Fecal occult blood test (annual) -Flexible sigmoidoscopy (5y) 
-Screening colonoscopy (10y) -Barium Enema  Completed: 
Patient states it has been more than 10 years Recommended every 10 years  Due: referral given to Dr. Kevon Appiah Counseling to Prevent Tobacco Use (up to 8 sessions per year) - Counseling greater than 3 and up to 10 minutes - Counseling greater than 10 minutes Patients must be asymptomatic of tobacco-related conditions to receive as preventive service Current smoker Diabetes Screening Tests (at least every 3 years, Medicare covers annually or at 6-month intervals for prediabetic patients) Fasting blood sugar (FBS) or glucose tolerance test (GTT) Patient must be diagnosed with one of the following: 
-Hypertension, Dyslipidemia, obesity, previous impaired FBS or GTT 
Or any two of the following: overweight, FH of diabetes, age ? 72, history of gestational diabetes, birth of baby weighing more than 9 pounds Completed: CMP 06/02/2017 Recommended every 3 years for non-diabetics Recommended every 3-6 months for Pre-Diabetics and Diabetics Due: patient is not a diabetic, not due to recheck until 06/02/2020 Diabetes Self-Management Training (DSMT) (no USPSTF recommendation) Requires referral by treating physician for patient with diabetes or renal disease. 10 hours of initial DSMT session of no less than 30 minutes each in a continuous 12-month period. 2 hours of follow-up DSMT in subsequent years. NA Glaucoma Screening (no USPSTF recommendation) Diabetes mellitus, family history, , age 48 or over,  American, age 72 or over Completed: 
01/26/18 Recommended annually Due: patient states that he goes every 6 month, has appointment for June Human Immunodeficiency Virus (HIV) Screening (annually for increased risk patients) HIV-1 and HIV-2 by EIA, MARI, rapid antibody test, or oral mucosa transudate Patient must be at increased risk for HIV infection per USPSTF guidelines or pregnant. Tests covered annually for patients at increased risk. Pregnant patients may receive up to 3 test during pregnancy. NA Medical Nutrition Therapy (MNT) (for diabetes or renal disease not recommended schedule) Requires referral by treating physician for patient with diabetes or renal disease. Can be provided in same year as diabetes self-management training (DSMT), and CMS recommends medical nutrition therapy take place after DSMT. Up to 3 hours for initial year and 2 hours in subsequent years. NA Prostate Cancer Screening (annually up to age 76) - Digital rectal exam (DM) - Prostate specific antigen (PSA) Annually (age 48 or over), DM not paid separately when covered E/M service is provided on same date Completed: 
03/07/2016 Recommended annually to age 76 Due:  Order pended Seasonal Influenza Vaccination (annually)  Completed: 
 09/28/2017 Recommended Annually Due: every fall Pneumococcal Vaccination (once after 65)  Pneumococcal 23 - Recommended once over the age of 72 Prevnar 13 - Recommended once/over the age of 72 Completed: 
 
11/20/2014 Completed: 2017 Hepatitis B Vaccinations (if medium/high risk) Medium/high risk factors:  End-stage renal disease, Hemophiliacs who received Factor VIII or IX concentrates, Clients of institutions for the mentally retarded, Persons who live in the same house as a HepB virus carrier, Homosexual men, Illicit injectable drug abusers. NA Screening Mammography (biennial age 54-69)? Annually (age 36 or over) Completed: Not Applicable Due: Not Applicable Screening Pap Tests and Pelvic Examination (up to age 79 and after 79 if unknown history or abnormal study last 10 years) Every 24 months except high risk Completed: Not Applicable  Due: Not Applicable Ultrasound Screening for Abdominal Aortic Aneurysm (AAA) (once) Patient must be referred through IPPE and not have had a screening for abdominal aortic aneurysm before under Medicare. Limited to patients who meet one of the following criteria: 
- Men who are 73-68 years old and have smoked more than 100 cigarettes in their lifetime. 
-Anyone with a FH of AAA 
-Anyone recommended for screening by USPSTF NA Advance Directives: After Your Visit Your Care Instructions An advance directive is a legal way to state your wishes at the end of your life. It tells your family and your doctor what to do if you can no longer say what you want. There are two main types of advance directives. You can change them any time that your wishes change. · A living will tells your family and your doctor your wishes about life support and other treatment. · A medical power of  lets you name a person to make treatment decisions for you when you can't speak for yourself. This person is called a health care agent. If you do not have an advance directive, decisions about your medical care may be made by a doctor or a  who doesn't know you.  
It may help to think of an advance directive as a gift to the people who care for you. If you have one, they won't have to make tough decisions by themselves. Follow-up care is a key part of your treatment and safety. Be sure to make and go to all appointments, and call your doctor if you are having problems. It's also a good idea to know your test results and keep a list of the medicines you take. How can you care for yourself at home? · Discuss your wishes with your loved ones and your doctor. This way, there are no surprises. · Many states have a unique form. Or you might use a universal form that has been approved by many states. This kind of form can sometimes be completed and stored online. Your electronic copy will then be available wherever you have a connection to the Internet. In most cases, doctors will respect your wishes even if you have a form from a different state. · You don't need a  to do an advance directive. But you may want to get legal advice. · Think about these questions when you prepare an advance directive: ¨ Who do you want to make decisions about your medical care if you are not able to? Many people choose a family member, close friend, or doctor. ¨ Do you know enough about life support methods that might be used? If not, talk to your doctor so you understand. ¨ What are you most afraid of that might happen? You might be afraid of having pain, losing your independence, or being kept alive by machines. ¨ Where would you prefer to die? Choices include your home, a hospital, or a nursing home. ¨ Would you like to have information about hospice care to support you and your family? ¨ Do you want to donate organs when you die? ¨ Do you want certain Evangelical practices performed before you die? If so, put your wishes in the advance directive. · Read your advance directive every year, and make changes as needed. When should you call for help? Be sure to contact your doctor if you have any questions. Where can you learn more? Go to DealExplorer.be Enter R264 in the search box to learn more about \"Advance Directives: After Your Visit. \"  
© 5359-4568 Healthwise, Incorporated. Care instructions adapted under license by Pasquale Mcqueen (which disclaims liability or warranty for this information). This care instruction is for use with your licensed healthcare professional. If you have questions about a medical condition or this instruction, always ask your healthcare professional. [address] any warranty or liability for your use of this information. Content Version: 28.8.992763; Current as of: February 20, 2015 Introducing [facility]! Pasquale Mcqueen introduces POKKT patient portal. Now you can access parts of your medical record, email your doctor's office, and request medication refills online. 1. In your internet browser, go to [email or web address] 2. Click on the First Time User? Click Here link in the Sign In box. You will see the New Member Sign Up page. 3. Enter your POKKT Access Code exactly as it appears below. You will not need to use this code after youve completed the sign-up process. If you do not sign up before the expiration date, you must request a new code. · POKKT Access Code: [email or web address] Expires: 8/14/2018 12:47 PM 
 
4. Enter the last four digits of your Social Security Number (xxxx) and Date of Birth (mm/dd/yyyy) as indicated and click Submit. You will be taken to the next sign-up page. 5. Create a Ubidynet ID. This will be your POKKT login ID and cannot be changed, so think of one that is secure and easy to remember. 6. Create a POKKT password. You can change your password at any time. 7. Enter your Password Reset Question and Answer. This can be used at a later time if you forget your password. 8. Enter your e-mail address.  You will receive e-mail notification when new information is available in Kickstarter. 9. Click Sign Up. You can now view and download portions of your medical record. 10. Click the Download Summary menu link to download a portable copy of your medical information. If you have questions, please visit the Frequently Asked Questions section of the Kickstarter website. Remember, Kickstarter is NOT to be used for urgent needs. For medical emergencies, dial 911. Now available from your iPhone and Android! Please provide this summary of care documentation to your next provider. Your primary care clinician is listed as Migue Recinos. If you have any questions after today's visit, please call [phone].

## 2018-05-17 RX ORDER — AMLODIPINE BESYLATE 5 MG/1
TABLET ORAL
Qty: 45 TAB | Refills: 1 | Status: SHIPPED | OUTPATIENT
Start: 2018-05-17 | End: 2018-08-29 | Stop reason: ALTCHOICE

## 2018-05-23 ENCOUNTER — TELEPHONE (OUTPATIENT)
Dept: INTERNAL MEDICINE CLINIC | Age: 68
End: 2018-05-23

## 2018-05-23 NOTE — TELEPHONE ENCOUNTER
Identified patient 2 identifiers verified. Patient has stopped smoking  Cigarettes cut back on caffeine started walking. 5/17/18 /85, 5/18 135/85 5/19 135/85 5/21 137/84 5/22 165/83  5/23 141/85. Patient given lab hours for lab work ordered. Patient instructed to continue to monitor BP and record.

## 2018-05-25 NOTE — TELEPHONE ENCOUNTER
Please advise him to take the whole pill of the amlodipine. Continue to check his bp. Nurse visit for bp check in one week and have patient bring in the home bp cuff.

## 2018-05-25 NOTE — TELEPHONE ENCOUNTER
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 He will call you when he gets home.

## 2018-05-25 NOTE — TELEPHONE ENCOUNTER
Call attempted to patient, there was no answer. Left a voice mail message requesting a return call for dosing instructions.

## 2018-05-25 NOTE — TELEPHONE ENCOUNTER
Call attempted to patient, there was no answer. Left a voice mail message advising patient to take advise him to take the whole pill of the amlodipine. Continue to check his bp and schedule a nurse visit for bp check in one week and have patient bring in the home bp cuff.  Requested a return call to schedule a nurse visit.

## 2018-05-29 ENCOUNTER — HOSPITAL ENCOUNTER (OUTPATIENT)
Dept: MAMMOGRAPHY | Age: 68
Discharge: HOME | End: 2018-05-29
Attending: FAMILY MEDICINE
Payer: MEDICARE

## 2018-05-29 DIAGNOSIS — Z00.00 MEDICARE ANNUAL WELLNESS VISIT, SUBSEQUENT: ICD-10-CM

## 2018-05-29 DIAGNOSIS — Z13.820 ENCOUNTER FOR SCREENING FOR OSTEOPOROSIS: ICD-10-CM

## 2018-05-29 PROCEDURE — 77080 DXA BONE DENSITY AXIAL: CPT

## 2018-06-29 ENCOUNTER — TELEPHONE (OUTPATIENT)
Dept: INTERNAL MEDICINE CLINIC | Age: 68
End: 2018-06-29

## 2018-06-29 DIAGNOSIS — I10 ESSENTIAL HYPERTENSION: ICD-10-CM

## 2018-06-29 RX ORDER — AMLODIPINE BESYLATE 10 MG/1
TABLET ORAL
Qty: 90 TAB | Refills: 11 | Status: SHIPPED | OUTPATIENT
Start: 2018-06-29 | End: 2019-07-05 | Stop reason: SDUPTHER

## 2018-06-29 RX ORDER — AMLODIPINE BESYLATE 10 MG/1
10 TABLET ORAL DAILY
Qty: 30 TAB | Refills: 11 | Status: SHIPPED | OUTPATIENT
Start: 2018-06-29 | End: 2018-06-29 | Stop reason: SDUPTHER

## 2018-06-29 RX ORDER — AMLODIPINE BESYLATE 5 MG/1
TABLET ORAL
Qty: 45 TAB | Refills: 1 | Status: CANCELLED | OUTPATIENT
Start: 2018-06-29

## 2018-06-29 NOTE — TELEPHONE ENCOUNTER
#[phone]  [facility] must verify strength please call again they ask. They did get the 90 day already.

## 2018-06-29 NOTE — TELEPHONE ENCOUNTER
Pt states he has to take a whole pill now and has run out due to the change. Please call into [facility] today as pt is out. He didn't realize that he couldn't just call for the refill and there needed to be a new script. Thanks.

## 2018-06-29 NOTE — TELEPHONE ENCOUNTER
Kennedy//Mackenzie states patient needs a new prescription for his amLODIPine (NORVASC) 5 mg tablet as patient advised he was verbally advised to start taking 1 whole pill a day & no longer just a 1/2 of pill. Please send new prescription or call pharmacist as patient is in need of medication. Please call.  Thank you

## 2018-07-02 ENCOUNTER — TELEPHONE (OUTPATIENT)
Dept: INTERNAL MEDICINE CLINIC | Age: 68
End: 2018-07-02

## 2018-07-02 NOTE — TELEPHONE ENCOUNTER
#[phone] [facility] is asking again he states for a call back pertaining to the strength of the amlodipine.

## 2018-08-29 ENCOUNTER — OFFICE VISIT (OUTPATIENT)
Dept: CARDIOLOGY CLINIC | Age: 68
End: 2018-08-29

## 2018-08-29 VITALS
RESPIRATION RATE: 18 BRPM | SYSTOLIC BLOOD PRESSURE: 154 MMHG | BODY MASS INDEX: 26.85 KG/M2 | WEIGHT: 191.8 LBS | DIASTOLIC BLOOD PRESSURE: 86 MMHG | HEART RATE: 69 BPM | OXYGEN SATURATION: 98 % | HEIGHT: 71 IN

## 2018-08-29 DIAGNOSIS — I50.22 SYSTOLIC CHF, CHRONIC (HCC): Primary | ICD-10-CM

## 2018-08-29 DIAGNOSIS — I10 ESSENTIAL HYPERTENSION: ICD-10-CM

## 2018-08-29 NOTE — PROGRESS NOTES
Amy Wong DNP, ANP-BC  Subjective/HPI:     Aide Stephens is a 77 y.o. male is here for routine f/u. The patient denies chest pain/ shortness of breath, orthopnea, PND, LE edema, palpitations, syncope, presyncope or fatigue. Patient reports feeling well in his usual state of health. History of nonischemic cardiomyopathy normal coronary arteries ejection fraction 45%. August 2017 improvement to 55-60%. Tolerating all medications without side effects. I review of systems with nurse he reported a stinging sensation in his chest which he reports feels like the need to belch and alleviates with belching, he denies any exertional component or dyspnea on exertion or fatigue. PCP Provider  Neil Jeroniom MD  Past Medical History:   Diagnosis Date    Arthritis     DJD (degenerative joint disease) of hip     Hypertension       Past Surgical History:   Procedure Laterality Date    HX ORTHOPAEDIC  2004    Total R hip replacement     No Known Allergies   Family History   Problem Relation Age of Onset    Hypertension Mother     Coronary Artery Disease Maternal Grandmother     Coronary Artery Disease Maternal Grandfather     Hypertension Sister       Current Outpatient Prescriptions   Medication Sig    lisinopril (PRINIVIL, ZESTRIL) 20 mg tablet TAKE 1 AND ONE-HALF TABLET BY MOUTH DAILY    amLODIPine (NORVASC) 10 mg tablet TAKE 1 TABLET BY MOUTH DAILY    metoprolol succinate (TOPROL-XL) 50 mg XL tablet Take 50 mg by mouth daily. No current facility-administered medications for this visit. Vitals:    08/29/18 1305 08/29/18 1320   BP: 140/84 154/86   Pulse: 69    Resp: 18    SpO2: 98%    Weight: 191 lb 12.8 oz (87 kg)    Height: 5' 11\" (1.803 m)      Social History     Social History    Marital status: SINGLE     Spouse name: N/A    Number of children: N/A    Years of education: N/A     Occupational History    Not on file.      Social History Main Topics    Smoking status: Former Smoker     Packs/day: 1.00     Years: 20.00    Smokeless tobacco: Never Used      Comment: doesn't inhale    Alcohol use Yes      Comment: occasionally cachorro    Drug use: No    Sexual activity: Not Currently     Other Topics Concern    Not on file     Social History Narrative       I have reviewed the nurses notes, vitals, problem list, allergy list, medical history, family, social history and medications. Review of Symptoms:    General: Pt denies excessive weight gain or loss. Pt is able to conduct ADL's  HEENT: Denies blurred vision, headaches, epistaxis and difficulty swallowing. Respiratory: Denies shortness of breath, LOWE, wheezing or stridor. Cardiovascular: Denies precordial pain, palpitations, edema or PND  Gastrointestinal: Denies poor appetite, indigestion, abdominal pain or blood in stool  Musculoskeletal: Denies pain or swelling from muscles or joints  Neurologic: Denies tremor, paresthesias, or sensory motor disturbance  Skin: Denies rash, itching or texture change. Physical Exam:      General: Well developed, in no acute distress, cooperative and alert  HEENT: No carotid bruits, no JVD, trach is midline. Neck Supple, PEERL, EOM intact. Heart:  Normal S1/S2 negative S3 or S4. Regular, no murmur, gallop or rub.   Respiratory: Clear bilaterally x 4, no wheezing or rales  Abdomen:   Soft, non-tender, no masses, bowel sounds are active.   Extremities:  No edema, normal cap refill, no cyanosis, atraumatic. Neuro: A&Ox3, speech clear, gait stable. Skin: Skin color is normal. No rashes or lesions.  Non diaphoretic  Vascular: 2+ pulses symmetric in all extremities    Cardiographics    ECG: Normal sinus rhythm  Results for orders placed or performed during the hospital encounter of 10/20/17   EKG, 12 LEAD, INITIAL   Result Value Ref Range    Ventricular Rate 54 BPM    Atrial Rate 54 BPM    P-R Interval 154 ms    QRS Duration 98 ms    Q-T Interval 428 ms    QTC Calculation (Bezet) 405 ms Calculated P Axis 57 degrees    Calculated R Axis -2 degrees    Calculated T Axis 39 degrees    Diagnosis       Sinus bradycardia with premature atrial complexes  Possible Left atrial enlargement  Incomplete right bundle branch block  Borderline ECG  When compared with ECG of 02-JUN-2017 01:48,  premature atrial complexes are now present  Incomplete right bundle branch block is now present  Confirmed by Mor Godoy ([id number]) on 10/23/2017 9:22:50 AM           Cardiology Labs:  Lab Results   Component Value Date/Time    Cholesterol, total 168 03/07/2016 01:53 PM    HDL Cholesterol 55 03/07/2016 01:53 PM    LDL, calculated 91 03/07/2016 01:53 PM    Triglyceride 110 03/07/2016 01:53 PM       Lab Results   Component Value Date/Time    Sodium 140 06/02/2017 02:15 AM    Potassium 3.8 06/02/2017 02:15 AM    Chloride 108 06/02/2017 02:15 AM    CO2 24 06/02/2017 02:15 AM    Anion gap 8 06/02/2017 02:15 AM    Glucose 97 06/02/2017 02:15 AM    BUN 30 (H) 06/02/2017 02:15 AM    Creatinine 1.44 (H) 06/02/2017 02:15 AM    BUN/Creatinine ratio 21 (H) 06/02/2017 02:15 AM    GFR est AA 59 (L) 06/02/2017 02:15 AM    GFR est non-AA 49 (L) 06/02/2017 02:15 AM    Calcium 9.2 06/02/2017 02:15 AM    Bilirubin, total 0.3 06/02/2017 02:15 AM    AST (SGOT) 13 (L) 06/02/2017 02:15 AM    Alk. phosphatase 79 06/02/2017 02:15 AM    Protein, total 7.1 06/02/2017 02:15 AM    Albumin 3.7 06/02/2017 02:15 AM    Globulin 3.4 06/02/2017 02:15 AM    A-G Ratio 1.1 06/02/2017 02:15 AM    ALT (SGPT) 20 06/02/2017 02:15 AM           Assessment:     Assessment:     Diagnoses and all orders for this visit:    1. Systolic CHF, chronic ([facility] [address]    2. Essential hypertension  -     AMB POC EKG ROUTINE W/ 12 LEADS, INTER & REP        ICD-10-CM ICD-9-CM    1. Systolic CHF, chronic (HCC) I50.22 428.22      428.0    2.  Essential hypertension I10 401.9 AMB POC EKG ROUTINE W/ 12 LEADS, INTER & REP     Orders Placed This Encounter    AMB POC EKG ROUTINE W/ 12 LEADS, INTER & REP     Order Specific Question:   Reason for Exam:     Answer:   Routine        Plan:     1. Nonischemic cardiomyopathy: Ejection fraction 2017 60%, presently [place] heart class I continue current medications  2. Hypertension: Repeat 142/78 left arm large cuff discussed with patient low-sodium diet, increase exercise and 5 pound weight loss for optimal hypertension control  3. Hyperlipidemia: Followed by primary care has planned follow-up for labs in the near future. Ryan Quintanilla NP    This note was created using voice recognition software. Despite editing, there may be syntax errors.

## 2018-08-29 NOTE — PROGRESS NOTES
1. Have you been to the ER, urgent care clinic since your last visit? Hospitalized since your last visit? Yes, [facility] for GERD     2. Have you seen or consulted any other health care providers outside of the [facility] since your last visit? Include any pap smears or colon screening.   No    Chief Complaint   Patient presents with    Medication Refill    Hypertension    Other     pt reports stinging sensation and tenderness to chest

## 2018-08-29 NOTE — MR AVS SNAPSHOT
[address] [facility] 
[phone] Patient: Rupinder Tsang MRN: [id number] Visit Information Date & Time Provider Department Dept. Phone Encounter #  
 8/29/2018  1:00 PM Kristian Carter NP [facility] [phone] [id number] Your Appointments 8/29/2019  1:15 PM  
ESTABLISHED PATIENT with Jim Franco MD  
[facility] [address] [place] [address]) Appt Note: Dr. Jass Yarbrough, 1 year jar [id number] [facility]  
[phone] [id number] [facility] Upcoming Health Maintenance Date Due Hepatitis C Screening 1950 DTaP/Tdap/Td series (1 - Tdap) 2/13/1969 FOBT Q 1 YEAR AGE 50-75 2/13/1998 ZOSTER VACCINE AGE 60> 12/13/2007 Influenza Age 5 to Adult 8/1/2018 MEDICARE YEARLY EXAM 5/17/2019 GLAUCOMA SCREENING Q2Y 1/29/2020 Allergies as of 8/29/2018  Review Complete On: 8/29/2018 By: Carmella Kawasaki No Known Allergies Current Immunizations  Never Reviewed Name Date Influenza High Dose Vaccine PF 9/28/2017 12:00 AM  
 Influenza Vaccine 3/7/2016, 11/20/2014 Pneumococcal Conjugate (PCV-13) 9/28/2017 12:00 AM  
 Pneumococcal Polysaccharide (PPSV-23) 11/20/2014 Not reviewed this visit You Were Diagnosed With   
  
 Codes Comments Essential hypertension    -  Primary ICD-10-CM: I10 
ICD-9-CM: 401.9 Vitals BP Pulse Resp Height(growth percentile) Weight(growth percentile) SpO2  
 154/86 (BP 1 Location: Right arm, BP Patient Position: Sitting) 69 18 5' 11\" (1.803 m) 191 lb 12.8 oz (87 kg) 98% BMI Smoking Status 26.75 kg/m2 Former Smoker Vitals History BMI and BSA Data Body Mass Index Body Surface Area  
 26.75 kg/m 2 2.09 m 2 Preferred Pharmacy Pharmacy Name Phone [address] [facility] [phone] Your Updated Medication List  
  
   
This list is accurate as of 8/29/18  1:47 PM.  Always use your most recent med list. amLODIPine 10 mg tablet Commonly known as:  Hilda Ricardo TAKE 1 TABLET BY MOUTH DAILY  
  
 lisinopril 20 mg tablet Commonly known as:  PRINIVIL, ZESTRIL  
TAKE 1 AND ONE-HALF TABLET BY MOUTH DAILY  
  
 metoprolol succinate 50 mg XL tablet Commonly known as:  TOPROL-XL Take 50 mg by mouth daily. We Performed the Following AMB POC EKG ROUTINE W/ 12 LEADS, INTER & REP [30317 CPT(R)] Introducing [facility] SERVICES! [employer] introduces [employer] patient portal. Now you can access parts of your medical record, email your doctor's office, and request medication refills online. 1. In your internet browser, go to [email or web address] 2. Click on the First Time User? Click Here link in the Sign In box. You will see the New Member Sign Up page. 3. Enter your [employer] Access Code exactly as it appears below. You will not need to use this code after youve completed the sign-up process. If you do not sign up before the expiration date, you must request a new code. · [employer] Access Code: [email or web address] Expires: 11/27/2018  1:45 PM 
 
4. Enter the last four digits of your Social Security Number (xxxx) and Date of Birth (mm/dd/yyyy) as indicated and click Submit. You will be taken to the next sign-up page. 5. Create a [employer] ID. This will be your [employer] login ID and cannot be changed, so think of one that is secure and easy to remember. 6. Create a [employer] password. You can change your password at any time. 7. Enter your Password Reset Question and Answer. This can be used at a later time if you forget your password. 8. Enter your e-mail address.  You will receive e-mail notification when new information is available in [employer]. 9. Click Sign Up. You can now view and download portions of your medical record. 10. Click the Download Summary menu link to download a portable copy of your medical information. If you have questions, please visit the Frequently Asked Questions section of the [employer] website. Remember, [employer] is NOT to be used for urgent needs. For medical emergencies, dial 911. Now available from your iPhone and Android! Please provide this summary of care documentation to your next provider. Your primary care clinician is listed as [facility]. If you have any questions after today's visit, please call [phone].

## 2018-09-12 RX ORDER — METOPROLOL SUCCINATE 50 MG/1
TABLET, EXTENDED RELEASE ORAL
Qty: 90 TAB | Refills: 0 | Status: SHIPPED | OUTPATIENT
Start: 2018-09-12 | End: 2018-12-12 | Stop reason: SDUPTHER

## 2018-11-06 ENCOUNTER — OFFICE VISIT (OUTPATIENT)
Dept: INTERNAL MEDICINE CLINIC | Age: 68
End: 2018-11-06

## 2018-11-06 VITALS
OXYGEN SATURATION: 99 % | BODY MASS INDEX: 26.32 KG/M2 | TEMPERATURE: 98.1 F | WEIGHT: 188 LBS | HEART RATE: 66 BPM | DIASTOLIC BLOOD PRESSURE: 75 MMHG | SYSTOLIC BLOOD PRESSURE: 151 MMHG | HEIGHT: 71 IN

## 2018-11-06 DIAGNOSIS — J01.90 SUBACUTE SINUSITIS, UNSPECIFIED LOCATION: Primary | ICD-10-CM

## 2018-11-06 RX ORDER — AMOXICILLIN AND CLAVULANATE POTASSIUM 875; 125 MG/1; MG/1
1 TABLET, FILM COATED ORAL EVERY 12 HOURS
Qty: 20 TAB | Refills: 0 | Status: SHIPPED | OUTPATIENT
Start: 2018-11-06 | End: 2018-12-05 | Stop reason: ALTCHOICE

## 2018-11-06 NOTE — PROGRESS NOTES
Chief Complaint   Patient presents with    Cough     productive cough (yellow in color)    Nasal Congestion     x 1 month

## 2018-11-06 NOTE — PROGRESS NOTES
HISTORY OF PRESENT ILLNESS  Michael Galvin is a 77 y.o. male. HPI     Here for acute care  C/o 3-4 weeks of thick sinus phlegm  No cough  Some left sinus pressure  No f/c  No otc medicines taken for sinus sxs      Patient Active Problem List    Diagnosis Date Noted    Ejection fraction < 50% 01/28/2015    HTN (hypertension) 01/28/2015    DJD (degenerative joint disease) of hip 06/02/2014    Abnormal EKG 05/01/2014     Current Outpatient Medications   Medication Sig Dispense Refill    metoprolol succinate (TOPROL-XL) 50 mg XL tablet TAKE 1 TABLET BY MOUTH EVERY DAY. INCREASED 08/11/17 90 Tab 0    lisinopril (PRINIVIL, ZESTRIL) 20 mg tablet TAKE 1 AND ONE-HALF TABLET BY MOUTH DAILY 135 Tab 1    amLODIPine (NORVASC) 10 mg tablet TAKE 1 TABLET BY MOUTH DAILY 90 Tab 11    metoprolol succinate (TOPROL-XL) 50 mg XL tablet Take 50 mg by mouth daily. 3     No Known Allergies   Lab Results   Component Value Date/Time    GFR est non-AA 49 (L) 06/02/2017 02:15 AM    GFRNA, POC >60 10/28/2010 05:39 PM    GFR est AA 59 (L) 06/02/2017 02:15 AM    GFRAA, POC >60 10/28/2010 05:39 PM    Creatinine 1.44 (H) 06/02/2017 02:15 AM    Creatinine (POC) 0.9 10/28/2010 05:39 PM    BUN 30 (H) 06/02/2017 02:15 AM    BUN (POC) 14 10/28/2010 05:39 PM    Sodium 140 06/02/2017 02:15 AM    Sodium (POC) 142 10/28/2010 05:39 PM    Potassium 3.8 06/02/2017 02:15 AM    Potassium (POC) 4.0 10/28/2010 05:39 PM    Chloride 108 06/02/2017 02:15 AM    Chloride (POC) 105 10/28/2010 05:39 PM    CO2 24 06/02/2017 02:15 AM        ROS    Physical Exam   Constitutional: He appears well-developed and well-nourished. No distress. Appears stated age   HENT:   Head: Normocephalic. Mouth/Throat: Oropharynx is clear and moist.   Cardiovascular: Normal rate, regular rhythm and normal heart sounds. Exam reveals no gallop and no friction rub. No murmur heard. Pulmonary/Chest: Effort normal and breath sounds normal. No respiratory distress.  He has no wheezes. He has no rales. He exhibits no tenderness. Abdominal: Soft. Musculoskeletal: He exhibits no edema. Lymphadenopathy:     He has no cervical adenopathy. Neurological: He is alert. Psychiatric: He has a normal mood and affect. Nursing note and vitals reviewed. ASSESSMENT and PLAN  Diagnoses and all orders for this visit:    1. Subacute sinusitis, unspecified location   augmentin x 10d   Saline nasal spray   To call or return if not improved  Other orders  -     amoxicillin-clavulanate (AUGMENTIN) 875-125 mg per tablet; Take 1 Tab by mouth every twelve (12) hours.       Follow-up Disposition:  Return in about 1 month (around 12/6/2018) for htn and labs--Dr Carlos Fuentes.

## 2018-12-05 ENCOUNTER — OFFICE VISIT (OUTPATIENT)
Dept: INTERNAL MEDICINE CLINIC | Age: 68
End: 2018-12-05

## 2018-12-05 VITALS
HEIGHT: 71 IN | RESPIRATION RATE: 18 BRPM | TEMPERATURE: 97.8 F | BODY MASS INDEX: 26.88 KG/M2 | SYSTOLIC BLOOD PRESSURE: 188 MMHG | OXYGEN SATURATION: 98 % | DIASTOLIC BLOOD PRESSURE: 84 MMHG | HEART RATE: 68 BPM | WEIGHT: 192 LBS

## 2018-12-05 DIAGNOSIS — R09.81 SINUS CONGESTION: ICD-10-CM

## 2018-12-05 DIAGNOSIS — R73.09 ELEVATED GLUCOSE: ICD-10-CM

## 2018-12-05 DIAGNOSIS — R35.0 FREQUENCY OF MICTURITION: ICD-10-CM

## 2018-12-05 DIAGNOSIS — I10 ESSENTIAL HYPERTENSION: Primary | ICD-10-CM

## 2018-12-05 DIAGNOSIS — R35.0 URINARY FREQUENCY: ICD-10-CM

## 2018-12-05 DIAGNOSIS — Z12.5 PROSTATE CANCER SCREENING: ICD-10-CM

## 2018-12-05 LAB
BILIRUB UR QL STRIP: NEGATIVE
GLUCOSE UR-MCNC: NEGATIVE MG/DL
KETONES P FAST UR STRIP-MCNC: NEGATIVE MG/DL
PH UR STRIP: 5.5 [PH] (ref 4.6–8)
PROT UR QL STRIP: NEGATIVE
SP GR UR STRIP: 1.01 (ref 1–1.03)
UA UROBILINOGEN AMB POC: NORMAL (ref 0.2–1)
URINALYSIS CLARITY POC: CLEAR
URINALYSIS COLOR POC: YELLOW
URINE BLOOD POC: NEGATIVE
URINE LEUKOCYTES POC: NEGATIVE
URINE NITRITES POC: NEGATIVE

## 2018-12-05 NOTE — PROGRESS NOTES
SUBJECTIVE:   Mr. Radha Grimes is a 77 y.o. male who is here for follow up of routine medical issues. Pt saw Dr. Maza Has on 11/6 for chronic sinus drainage (yellow phlegm); pt was prescribed augmentin. Pt reports at onset the phlegm was more grey in color and is now a light yellow. Pt notes he has been working as a  lately. Pt denies coughing. Pt reports urinary urgency, nocturia ongoing for awhile, and an isolated instance of burning at the penis tip. Pt denies external irritation. Pt notes one of his sisters was recently told she was pre-DM. He acknowledges occasional numbness/tingling in his feet. Pt's BP in the office today is 188/84 and on manual recheck was 763 systolic. Pt acknowledges taking his medication this morning. He states last night he ate out of the ordinary for him. Pt is compliant with amlodipine, lisinopril, and metoprolol succinate. Pt has a wrist BP cuff. Pt acknowledges chronic lower back pain due to a bulged disc. Pt denies increased sodium consumption. Pt reports he has stopped smoking since his last visit in 5/2018. Pt reports he has also stopped drinking alcohol except occasionally. Pt reports he received a FIT test through his insurance. He has not completed the test yet. At this time, he is otherwise doing well and has brought no other complaints to my attention today. For a list of the medical issues addressed today, see the assessment and plan below. PMH:   Past Medical History:   Diagnosis Date    Arthritis     DJD (degenerative joint disease) of hip     Hypertension      PSH:  has a past surgical history that includes hx orthopaedic (2004) and LEFT TOTAL HIP ARTHROPLASTY (Left, 6/2/2014). All: has No Known Allergies. MEDS:   Current Outpatient Medications   Medication Sig    metoprolol succinate (TOPROL-XL) 50 mg XL tablet TAKE 1 TABLET BY MOUTH EVERY DAY.  INCREASED 08/11/17    lisinopril (PRINIVIL, ZESTRIL) 20 mg tablet TAKE 1 AND ONE-HALF TABLET BY MOUTH DAILY    amLODIPine (NORVASC) 10 mg tablet TAKE 1 TABLET BY MOUTH DAILY     No current facility-administered medications for this visit. FH: family history includes Coronary Artery Disease in his maternal grandfather and maternal grandmother; Hypertension in his mother and sister. SH:  reports that he has quit smoking. He has a 20.00 pack-year smoking history. he has never used smokeless tobacco. He reports that he drinks alcohol. He reports that he does not use drugs. Review of Systems - History obtained from the patient  General ROS: no fever, chills, fatigue, body aches  Psychological ROS: no change in anxiety, depression, SI/HI  Ophthalmic ROS: no blurred vision, myopia, double vision  ENT ROS: sinus congestion (light yellow) no dysphagia, otalgia, otorrhea, rhinorrhea, post nasal drip  Respiratory ROS: no cough, shortness of breath, or wheezing  Cardiovascular ROS: no chest pain or dyspnea on exertion  Gastrointestinal ROS: no abdominal pain, change in bowel habits, or black or bloody stools  Genito-Urinary ROS: nocturia, urgency, no incontinence, dysuria, hematouria  Musculoskeletal ROS: no arthralgia, myalgia  Neurological ROS: numbness/tingling feet, no headaches, dizziness, lightheadedness, tremors, seizures  Dermatological ROS: no rash or lesions    OBJECTIVE:   Vitals:   Visit Vitals  /84 (BP 1 Location: Left arm, BP Patient Position: Sitting)   Pulse 68   Temp 97.8 °F (36.6 °C) (Oral)   Resp 18   Ht 5' 11\" (1.803 m)   Wt 192 lb (87.1 kg)   SpO2 98%   BMI 26.78 kg/m²      Gen: Pleasant 77 y.o.  male in NAD. HEENT: PERRLA. EOMI. OP moist and pink. Neck: Supple. No LAD. HEART: RRR, No M/G/R.      LUNGS: CTAB No W/R. EXTREMITIES: Warm. No C/C/E.    MUSCULOSKELETAL: Normal ROM, muscle strength 5/5 all groups. NEURO: Alert and oriented x 3. Cranial nerves grossly intact. No focal sensory or motor deficits noted. SKIN: Warm. Dry.  No rashes or other lesions noted. ASSESSMENT/ PLAN: Diagnoses and all orders for this visit:    1. Essential hypertension  -     METABOLIC PANEL, COMPREHENSIVE    2. Sinus congestion    3. Urinary frequency  -     METABOLIC PANEL, COMPREHENSIVE  -     CBC WITH AUTOMATED DIFF  -     AMB POC URINALYSIS DIP STICK AUTO W/O MICRO    4. Prostate cancer screening  -     PSA, DIAGNOSTIC (PROSTATE SPECIFIC AG)    5. Elevated glucose  -     HEMOGLOBIN A1C WITH EAG    6. Frequency of micturition   -     PSA, DIAGNOSTIC (PROSTATE SPECIFIC AG)        ICD-10-CM ICD-9-CM    1. Essential hypertension Q61 904.7 METABOLIC PANEL, COMPREHENSIVE   2. Sinus congestion R09.81 478.19    3. Urinary frequency P90.3 535.17 METABOLIC PANEL, COMPREHENSIVE      CBC WITH AUTOMATED DIFF      AMB POC URINALYSIS DIP STICK AUTO W/O MICRO   4. Prostate cancer screening Z12.5 V76.44 PSA, DIAGNOSTIC (PROSTATE SPECIFIC AG)   5. Elevated glucose R73.09 790.29 HEMOGLOBIN A1C WITH EAG   6. Frequency of micturition  R35.0 788.41 PSA, DIAGNOSTIC (PROSTATE SPECIFIC AG)      1. Hypertension  I recommended continuing current dose of amlodipine, lisinopril, and metoprolol succinate, eating a low sodium diet, and increasing exercise. I recommended pt use a digital arm BP cuff to check at home and record a log over the next week to determine any necessary medication changes. Recheck CMP. 2. Sinus congestion  I recommended pt use OTC flonase to alleviate sinus congestion. 3. Urinary frequency  Pt's UA was normal. I ordered labs for CMP, CBC for further assessment. 4. Prostate cancer screening  Recheck PSA. 5. Elevated glucose  I recommended pt avoid sugars and starches and increase exercise. Recheck A1c.     6. Frequency of micturition  Recheck PSA for further evaluation. I congratulated pt on his smoking cessation. Follow-up Disposition:  Return in about 6 months (around 6/5/2019) for follow up htn.     I have reviewed the patient's medications and risks/side effects/benefits were discussed. Diagnosis(-es) explained to patient and questions answered. Literature provided where appropriate.      Written by Bernice Bazan, as dictated by Gorge Lin MD.

## 2018-12-05 NOTE — PROGRESS NOTES
Reviewed record in preparation for visit and have obtained necessary documentation. Identified pt with two pt identifiers(name and ). Chief Complaint   Patient presents with    Sinus Infection       Health Maintenance Due   Topic Date Due    Hepatitis C Test  1950    DTaP/Tdap/Td  (1 - Tdap) 1969    Shingles Vaccine (1 of 2) 1998    Stool testing for trace blood  1998    AAA Screening  2013    Flu Vaccine  2018       Mr. Norah Rodriguez has a reminder for a \"due or due soon\" health maintenance. I have asked that he discuss this further with his primary care provider for follow-up on this health maintenance. Coordination of Care Questionnaire:  :     1) Have you been to an emergency room, urgent care clinic since your last visit? no     Hospitalized since your last visit? no             2) Have you seen or consulted any other health care providers outside of [address] [place] [address] since your last visit? no  (Include any pap smears or colon screenings in this section.)    3) In the event something were to happen to you and you were unable to speak on your behalf, do you have an Advance Directive/ Living Will in place stating your wishes? NO    Do you have an Advance Directive on file? no    4) Are you interested in receiving information on Advance Directives? NO    Patient is accompanied by self I have received verbal consent from María Grady to discuss any/all medical information while they are present in the room.

## 2018-12-06 LAB
ALBUMIN SERPL-MCNC: 4.6 G/DL (ref 3.5–4.8)
ALBUMIN/GLOB SERPL: 1.6 {RATIO} (ref 1.2–2.2)
ALP SERPL-CCNC: 95 IU/L (ref 39–117)
ALT SERPL-CCNC: 18 IU/L (ref 0–44)
AST SERPL-CCNC: 15 IU/L (ref 0–40)
BASOPHILS # BLD AUTO: 0 X10E3/UL (ref 0–0.2)
BASOPHILS NFR BLD AUTO: 1 %
BILIRUB SERPL-MCNC: 0.3 MG/DL (ref 0–1.2)
BUN SERPL-MCNC: 17 MG/DL (ref 8–27)
BUN/CREAT SERPL: 16 (ref 10–24)
CALCIUM SERPL-MCNC: 9.9 MG/DL (ref 8.6–10.2)
CHLORIDE SERPL-SCNC: 104 MMOL/L (ref 96–106)
CHOLEST SERPL-MCNC: 230 MG/DL (ref 100–199)
CO2 SERPL-SCNC: 23 MMOL/L (ref 20–29)
CREAT SERPL-MCNC: 1.07 MG/DL (ref 0.76–1.27)
EOSINOPHIL # BLD AUTO: 0.2 X10E3/UL (ref 0–0.4)
EOSINOPHIL NFR BLD AUTO: 4 %
ERYTHROCYTE [DISTWIDTH] IN BLOOD BY AUTOMATED COUNT: 13.1 % (ref 12.3–15.4)
EST. AVERAGE GLUCOSE BLD GHB EST-MCNC: 100 MG/DL
GLOBULIN SER CALC-MCNC: 2.9 G/DL (ref 1.5–4.5)
GLUCOSE SERPL-MCNC: 91 MG/DL (ref 65–99)
HBA1C MFR BLD: 5.1 % (ref 4.8–5.6)
HCT VFR BLD AUTO: 38.4 % (ref 37.5–51)
HDLC SERPL-MCNC: 55 MG/DL
HGB BLD-MCNC: 12.2 G/DL (ref 13–17.7)
IMM GRANULOCYTES # BLD: 0 X10E3/UL (ref 0–0.1)
IMM GRANULOCYTES NFR BLD: 0 %
LDLC SERPL CALC-MCNC: 145 MG/DL (ref 0–99)
LYMPHOCYTES # BLD AUTO: 2.5 X10E3/UL (ref 0.7–3.1)
LYMPHOCYTES NFR BLD AUTO: 46 %
MCH RBC QN AUTO: 31 PG (ref 26.6–33)
MCHC RBC AUTO-ENTMCNC: 31.8 G/DL (ref 31.5–35.7)
MCV RBC AUTO: 98 FL (ref 79–97)
MONOCYTES # BLD AUTO: 0.5 X10E3/UL (ref 0.1–0.9)
MONOCYTES NFR BLD AUTO: 10 %
NEUTROPHILS # BLD AUTO: 2.1 X10E3/UL (ref 1.4–7)
NEUTROPHILS NFR BLD AUTO: 39 %
PLATELET # BLD AUTO: 301 X10E3/UL (ref 150–379)
POTASSIUM SERPL-SCNC: 5.1 MMOL/L (ref 3.5–5.2)
PROT SERPL-MCNC: 7.5 G/DL (ref 6–8.5)
PSA SERPL-MCNC: 1.8 NG/ML (ref 0–4)
RBC # BLD AUTO: 3.94 X10E6/UL (ref 4.14–5.8)
SODIUM SERPL-SCNC: 142 MMOL/L (ref 134–144)
TRIGL SERPL-MCNC: 148 MG/DL (ref 0–149)
VLDLC SERPL CALC-MCNC: 30 MG/DL (ref 5–40)
WBC # BLD AUTO: 5.4 X10E3/UL (ref 3.4–10.8)

## 2018-12-06 NOTE — PROGRESS NOTES
Lipids-Your current lab results reveal a elevated total cholesterol and ldl. Your total cholesterol should be under 200 and your ldl under 100. Work on following a low fat diet and exercise at least three times a week.

## 2018-12-13 RX ORDER — METOPROLOL SUCCINATE 50 MG/1
TABLET, EXTENDED RELEASE ORAL
Qty: 90 TAB | Refills: 0 | Status: SHIPPED | OUTPATIENT
Start: 2018-12-13 | End: 2019-03-20 | Stop reason: SDUPTHER

## 2019-03-20 RX ORDER — METOPROLOL SUCCINATE 50 MG/1
TABLET, EXTENDED RELEASE ORAL
Qty: 90 TAB | Refills: 1 | Status: SHIPPED | OUTPATIENT
Start: 2019-03-20 | End: 2019-09-12 | Stop reason: SDUPTHER

## 2019-06-30 RX ORDER — LISINOPRIL 20 MG/1
TABLET ORAL
Qty: 135 TAB | Refills: 1 | Status: SHIPPED | OUTPATIENT
Start: 2019-06-30 | End: 2019-12-27

## 2019-07-05 RX ORDER — AMLODIPINE BESYLATE 10 MG/1
TABLET ORAL
Qty: 90 TAB | Refills: 11 | Status: SHIPPED | OUTPATIENT
Start: 2019-07-05 | End: 2020-10-07

## 2019-08-11 ENCOUNTER — HOSPITAL ENCOUNTER (EMERGENCY)
Age: 69
Discharge: HOME | End: 2019-08-11
Attending: EMERGENCY MEDICINE
Payer: MEDICARE

## 2019-08-11 ENCOUNTER — APPOINTMENT (OUTPATIENT)
Dept: GENERAL RADIOLOGY | Age: 69
End: 2019-08-11
Attending: EMERGENCY MEDICINE
Payer: MEDICARE

## 2019-08-11 VITALS
RESPIRATION RATE: 13 BRPM | HEIGHT: 71 IN | SYSTOLIC BLOOD PRESSURE: 118 MMHG | BODY MASS INDEX: 27.5 KG/M2 | WEIGHT: 196.43 LBS | DIASTOLIC BLOOD PRESSURE: 63 MMHG | OXYGEN SATURATION: 98 % | TEMPERATURE: 98.9 F | HEART RATE: 66 BPM

## 2019-08-11 DIAGNOSIS — K21.00 GASTROESOPHAGEAL REFLUX DISEASE WITH ESOPHAGITIS: ICD-10-CM

## 2019-08-11 DIAGNOSIS — R07.9 CHEST PAIN, UNSPECIFIED TYPE: Primary | ICD-10-CM

## 2019-08-11 LAB
ALBUMIN SERPL-MCNC: 4.1 G/DL (ref 3.5–5)
ALBUMIN/GLOB SERPL: 1.1 {RATIO} (ref 1.1–2.2)
ALP SERPL-CCNC: 91 U/L (ref 45–117)
ALT SERPL-CCNC: 23 U/L (ref 12–78)
ANION GAP SERPL CALC-SCNC: 6 MMOL/L (ref 5–15)
AST SERPL-CCNC: 19 U/L (ref 15–37)
BASOPHILS # BLD: 0.1 K/UL (ref 0–0.1)
BASOPHILS NFR BLD: 1 % (ref 0–1)
BILIRUB SERPL-MCNC: 0.3 MG/DL (ref 0.2–1)
BUN SERPL-MCNC: 19 MG/DL (ref 6–20)
BUN/CREAT SERPL: 14 (ref 12–20)
CALCIUM SERPL-MCNC: 9.1 MG/DL (ref 8.5–10.1)
CHLORIDE SERPL-SCNC: 106 MMOL/L (ref 97–108)
CK MB CFR SERPL CALC: 0.7 % (ref 0–2.5)
CK MB SERPL-MCNC: 1.4 NG/ML (ref 5–25)
CK SERPL-CCNC: 196 U/L (ref 39–308)
CO2 SERPL-SCNC: 27 MMOL/L (ref 21–32)
CREAT SERPL-MCNC: 1.36 MG/DL (ref 0.7–1.3)
DIFFERENTIAL METHOD BLD: ABNORMAL
EOSINOPHIL # BLD: 0.2 K/UL (ref 0–0.4)
EOSINOPHIL NFR BLD: 3 % (ref 0–7)
ERYTHROCYTE [DISTWIDTH] IN BLOOD BY AUTOMATED COUNT: 11.9 % (ref 11.5–14.5)
GLOBULIN SER CALC-MCNC: 3.8 G/DL (ref 2–4)
GLUCOSE SERPL-MCNC: 142 MG/DL (ref 65–100)
HCT VFR BLD AUTO: 39.7 % (ref 36.6–50.3)
HGB BLD-MCNC: 13 G/DL (ref 12.1–17)
IMM GRANULOCYTES # BLD AUTO: 0 K/UL (ref 0–0.04)
IMM GRANULOCYTES NFR BLD AUTO: 0 % (ref 0–0.5)
LYMPHOCYTES # BLD: 1.8 K/UL (ref 0.8–3.5)
LYMPHOCYTES NFR BLD: 30 % (ref 12–49)
MCH RBC QN AUTO: 31.9 PG (ref 26–34)
MCHC RBC AUTO-ENTMCNC: 32.7 G/DL (ref 30–36.5)
MCV RBC AUTO: 97.5 FL (ref 80–99)
MONOCYTES # BLD: 0.6 K/UL (ref 0–1)
MONOCYTES NFR BLD: 9 % (ref 5–13)
NEUTS SEG # BLD: 3.5 K/UL (ref 1.8–8)
NEUTS SEG NFR BLD: 57 % (ref 32–75)
NRBC # BLD: 0 K/UL (ref 0–0.01)
NRBC BLD-RTO: 0 PER 100 WBC
PLATELET # BLD AUTO: 228 K/UL (ref 150–400)
PMV BLD AUTO: 11.1 FL (ref 8.9–12.9)
POTASSIUM SERPL-SCNC: 4.1 MMOL/L (ref 3.5–5.1)
PROT SERPL-MCNC: 7.9 G/DL (ref 6.4–8.2)
RBC # BLD AUTO: 4.07 M/UL (ref 4.1–5.7)
SODIUM SERPL-SCNC: 139 MMOL/L (ref 136–145)
TROPONIN I SERPL-MCNC: <0.05 NG/ML
WBC # BLD AUTO: 6.2 K/UL (ref 4.1–11.1)

## 2019-08-11 PROCEDURE — 71046 X-RAY EXAM CHEST 2 VIEWS: CPT

## 2019-08-11 PROCEDURE — 74011250637 HC RX REV CODE- 250/637: Performed by: EMERGENCY MEDICINE

## 2019-08-11 PROCEDURE — 74011000250 HC RX REV CODE- 250: Performed by: EMERGENCY MEDICINE

## 2019-08-11 PROCEDURE — 84484 ASSAY OF TROPONIN QUANT: CPT

## 2019-08-11 PROCEDURE — 36415 COLL VENOUS BLD VENIPUNCTURE: CPT

## 2019-08-11 PROCEDURE — 82550 ASSAY OF CK (CPK): CPT

## 2019-08-11 PROCEDURE — 80053 COMPREHEN METABOLIC PANEL: CPT

## 2019-08-11 PROCEDURE — 99284 EMERGENCY DEPT VISIT MOD MDM: CPT

## 2019-08-11 PROCEDURE — 85025 COMPLETE CBC W/AUTO DIFF WBC: CPT

## 2019-08-11 PROCEDURE — 93005 ELECTROCARDIOGRAM TRACING: CPT

## 2019-08-11 RX ORDER — PHENOL/SODIUM PHENOLATE
20 AEROSOL, SPRAY (ML) MUCOUS MEMBRANE DAILY
Qty: 20 TAB | Refills: 0 | Status: SHIPPED | OUTPATIENT
Start: 2019-08-11 | End: 2019-09-13 | Stop reason: SDUPTHER

## 2019-08-11 RX ORDER — METHOCARBAMOL 500 MG/1
500 TABLET, FILM COATED ORAL 4 TIMES DAILY
Qty: 20 TAB | Refills: 0 | Status: SHIPPED | OUTPATIENT
Start: 2019-08-11 | End: 2019-09-13 | Stop reason: SDUPTHER

## 2019-08-11 RX ORDER — SODIUM CHLORIDE 0.9 % (FLUSH) 0.9 %
5-40 SYRINGE (ML) INJECTION AS NEEDED
Status: DISCONTINUED | OUTPATIENT
Start: 2019-08-11 | End: 2019-08-11 | Stop reason: HOSPADM

## 2019-08-11 RX ORDER — SODIUM CHLORIDE 0.9 % (FLUSH) 0.9 %
5-40 SYRINGE (ML) INJECTION EVERY 8 HOURS
Status: DISCONTINUED | OUTPATIENT
Start: 2019-08-11 | End: 2019-08-11 | Stop reason: HOSPADM

## 2019-08-11 RX ADMIN — LIDOCAINE HYDROCHLORIDE 40 ML: 20 SOLUTION ORAL; TOPICAL at 17:10

## 2019-08-11 NOTE — ED NOTES
Pt comes from home with sibling bedside. Pt reports ongoing chest pain, tightness X3 days. Pt reports 6/10 generalized upper body pain in the chest, shoulders and neck. Pt has hx gerd. Pt is monitored X3.

## 2019-08-11 NOTE — ED PROVIDER NOTES
EMERGENCY DEPARTMENT HISTORY AND PHYSICAL EXAM      Date: 8/11/2019  Patient Name: Je See  Patient Age and Sex: 68 y.o. male     History of Presenting Illness     Chief Complaint   Patient presents with    Chest Pain     couple of days around lower part chest ribs; and hard to take a deep breath       History Provided By: Patient    HPI: Je See is a 68-year-old male with a history of hypertension with chest discomfort. Patient states that for the past 3 days has been having substernal chest discomfort radiating around his chest.  It is not associated with any fevers, cough, shortness of breath, leg swelling, back pain. Patient states that a few weeks ago he was lifting some heavy cinderblocks in his room but did not have any pain shortly after that. Has also been having some pain in his shoulders and bilateral.  Patient states he has a history of acid reflux but no prior history of CAD, heart attacks or heart failure. Currently states that he is not having pain but rather discomfort and if he had to rated it would be a 6 out of 10. There are no other complaints, changes, or physical findings at this time. PCP: Santino Valdovinos MD    No current facility-administered medications on file prior to encounter. Current Outpatient Medications on File Prior to Encounter   Medication Sig Dispense Refill    amLODIPine (NORVASC) 10 mg tablet TAKE 1 TABLET BY MOUTH DAILY 90 Tab 11    lisinopril (PRINIVIL, ZESTRIL) 20 mg tablet TAKE 1 AND ONE-HALF TABLET BY MOUTH DAILY 135 Tab 1    metoprolol succinate (TOPROL-XL) 50 mg XL tablet TAKE 1 TABLET BY MOUTH EVERY DAY.  INCREASE 08/11/17 90 Tab 1       Past History     Past Medical History:  Past Medical History:   Diagnosis Date    Arthritis     DJD (degenerative joint disease) of hip     Hypertension        Past Surgical History:  Past Surgical History:   Procedure Laterality Date    HX ORTHOPAEDIC  2004    Total R hip replacement       Family History:  Family History   Problem Relation Age of Onset    Hypertension Mother     Coronary Artery Disease Maternal Grandmother     Coronary Artery Disease Maternal Grandfather     Hypertension Sister        Social History:  Social History     Tobacco Use    Smoking status: Former Smoker     Packs/day: 1.00     Years: 20.00     Pack years: 20.00    Smokeless tobacco: Never Used    Tobacco comment: doesn't inhale   Substance Use Topics    Alcohol use: Yes     Comment: occasionally cachorro    Drug use: No       Allergies:  No Known Allergies      Review of Systems   Review of Systems   Constitutional: Negative for chills and fever. Respiratory: Negative for cough and shortness of breath. Cardiovascular: Positive for chest pain. Negative for leg swelling. Gastrointestinal: Negative for abdominal pain, constipation, diarrhea, nausea and vomiting. Musculoskeletal: Positive for arthralgias and neck pain. Neurological: Negative for weakness and numbness. All other systems reviewed and are negative. Physical Exam   Physical Exam   Constitutional: He is oriented to person, place, and time. He appears well-developed and well-nourished. HENT:   Head: Normocephalic and atraumatic. Eyes: Conjunctivae and EOM are normal.   Neck: Normal range of motion. Neck supple. Cardiovascular: Normal rate and regular rhythm. Pulmonary/Chest: Effort normal and breath sounds normal. No respiratory distress. Abdominal: Soft. He exhibits no distension. There is no tenderness. Musculoskeletal: Normal range of motion. Patient has some mild tenderness over bilateral trapezius muscle, chest wall, substernal.   Neurological: He is alert and oriented to person, place, and time. Skin: Skin is warm and dry. Psychiatric: He has a normal mood and affect. Nursing note and vitals reviewed.        Diagnostic Study Results     Labs -     Recent Results (from the past 12 hour(s))   EKG, 12 LEAD, INITIAL Collection Time: 08/11/19  4:32 PM   Result Value Ref Range    Ventricular Rate 87 BPM    Atrial Rate 87 BPM    P-R Interval 156 ms    QRS Duration 102 ms    Q-T Interval 378 ms    QTC Calculation (Bezet) 454 ms    Calculated P Axis 60 degrees    Calculated R Axis 0 degrees    Calculated T Axis 43 degrees    Diagnosis       Normal sinus rhythm  Possible Left atrial enlargement  When compared with ECG of 20-OCT-2017 10:04,  premature atrial complexes are no longer present  Vent. rate has increased BY  33 BPM  Incomplete right bundle branch block is no longer present     CBC WITH AUTOMATED DIFF    Collection Time: 08/11/19  5:12 PM   Result Value Ref Range    WBC 6.2 4.1 - 11.1 K/uL    RBC 4.07 (L) 4.10 - 5.70 M/uL    HGB 13.0 12.1 - 17.0 g/dL    HCT 39.7 36.6 - 50.3 %    MCV 97.5 80.0 - 99.0 FL    MCH 31.9 26.0 - 34.0 PG    MCHC 32.7 30.0 - 36.5 g/dL    RDW 11.9 11.5 - 14.5 %    PLATELET 114 107 - 213 K/uL    MPV 11.1 8.9 - 12.9 FL    NRBC 0.0 0  WBC    ABSOLUTE NRBC 0.00 0.00 - 0.01 K/uL    NEUTROPHILS 57 32 - 75 %    LYMPHOCYTES 30 12 - 49 %    MONOCYTES 9 5 - 13 %    EOSINOPHILS 3 0 - 7 %    BASOPHILS 1 0 - 1 %    IMMATURE GRANULOCYTES 0 0.0 - 0.5 %    ABS. NEUTROPHILS 3.5 1.8 - 8.0 K/UL    ABS. LYMPHOCYTES 1.8 0.8 - 3.5 K/UL    ABS. MONOCYTES 0.6 0.0 - 1.0 K/UL    ABS. EOSINOPHILS 0.2 0.0 - 0.4 K/UL    ABS. BASOPHILS 0.1 0.0 - 0.1 K/UL    ABS. IMM.  GRANS. 0.0 0.00 - 0.04 K/UL    DF AUTOMATED     METABOLIC PANEL, COMPREHENSIVE    Collection Time: 08/11/19  5:12 PM   Result Value Ref Range    Sodium 139 136 - 145 mmol/L    Potassium 4.1 3.5 - 5.1 mmol/L    Chloride 106 97 - 108 mmol/L    CO2 27 21 - 32 mmol/L    Anion gap 6 5 - 15 mmol/L    Glucose 142 (H) 65 - 100 mg/dL    BUN 19 6 - 20 MG/DL    Creatinine 1.36 (H) 0.70 - 1.30 MG/DL    BUN/Creatinine ratio 14 12 - 20      GFR est AA >60 >60 ml/min/1.73m2    GFR est non-AA 52 (L) >60 ml/min/1.73m2    Calcium 9.1 8.5 - 10.1 MG/DL    Bilirubin, total 0.3 0.2 - 1.0 MG/DL    ALT (SGPT) 23 12 - 78 U/L    AST (SGOT) 19 15 - 37 U/L    Alk. phosphatase 91 45 - 117 U/L    Protein, total 7.9 6.4 - 8.2 g/dL    Albumin 4.1 3.5 - 5.0 g/dL    Globulin 3.8 2.0 - 4.0 g/dL    A-G Ratio 1.1 1.1 - 2.2     TROPONIN I    Collection Time: 08/11/19  5:12 PM   Result Value Ref Range    Troponin-I, Qt. <0.05 <0.05 ng/mL   CK W/ CKMB & INDEX    Collection Time: 08/11/19  5:12 PM   Result Value Ref Range     39 - 308 U/L    CK - MB 1.4 <3.6 NG/ML    CK-MB Index 0.7 0.0 - 2.5         Radiologic Studies -   XR CHEST PA LAT   Final Result   IMPRESSION:      No acute process. Stable exam.           CT Results  (Last 48 hours)    None        CXR Results  (Last 48 hours)               08/11/19 1704  XR CHEST PA LAT Final result    Impression:  IMPRESSION:       No acute process. Stable exam.           Narrative:  EXAM:  XR CHEST PA LAT       INDICATION: Chest pain. COMPARISON: 6/2/2017       TECHNIQUE: PA and lateral chest views       FINDINGS: Cardiac monitoring wires overlie the thorax. The cardiomediastinal   contours are stable. The pulmonary vasculature is within normal limits. The lungs and pleural spaces are clear. There is no pneumothorax. The bones and   upper abdomen are stable. Medical Decision Making   I am the first provider for this patient. I reviewed the vital signs, available nursing notes, past medical history, past surgical history, family history and social history. Vital Signs-Reviewed the patient's vital signs. Patient Vitals for the past 12 hrs:   Temp Pulse Resp BP SpO2   08/11/19 1736 -- 66 13 118/63 98 %   08/11/19 1712 -- 69 16 -- 99 %   08/11/19 1636 98.9 °F (37.2 °C) 84 18 -- 100 %       Records Reviewed: Nursing Notes and Old Medical Records    Provider Notes (Medical Decision Making):   Patient presenting with 3 days of chest discomfort. More likely musculoskeletal pain given tenderness over neck as well as chest wall. Differential also includes GERD. Less likely PE, ACS, dissection, pneumonia. ED Course:   Initial assessment performed. The patients presenting problems have been discussed, and they are in agreement with the care plan formulated and outlined with them. I have encouraged them to ask questions as they arise throughout their visit. Critical Care Time:   0    Disposition:  Discharge Note:  The patient has been re-evaluated and is ready for discharge. Reviewed available results with patient. Counseled patient on diagnosis and care plan. Patient has expressed understanding, and all questions have been answered. Patient agrees with plan and agrees to follow up as recommended, or to return to the ED if their symptoms worsen. Discharge instructions have been provided and explained to the patient, along with reasons to return to the ED. PLAN:  Discharge Medication List as of 8/11/2019  5:59 PM      START taking these medications    Details   methocarbamol (ROBAXIN) 500 mg tablet Take 1 Tab by mouth four (4) times daily. , Normal, Disp-20 Tab, R-0      Omeprazole delayed release (PRILOSEC D/R) 20 mg tablet Take 1 Tab by mouth daily. , Normal, Disp-20 Tab, R-0         CONTINUE these medications which have NOT CHANGED    Details   amLODIPine (NORVASC) 10 mg tablet TAKE 1 TABLET BY MOUTH DAILY, Normal**Patient requests 90 days supply**Disp-90 Tab, R-11      lisinopril (PRINIVIL, ZESTRIL) 20 mg tablet TAKE 1 AND ONE-HALF TABLET BY MOUTH DAILY, Normal, Disp-135 Tab, R-1      metoprolol succinate (TOPROL-XL) 50 mg XL tablet TAKE 1 TABLET BY MOUTH EVERY DAY. INCREASE 08/11/17, Normal, Disp-90 Tab, R-1           2. Follow-up Information     Follow up With Specialties Details Why Contact Info    Hector Norton MD [facility]  As needed [address] [phone]          3. Return to ED if worse     Diagnosis     Clinical Impression:   1.  Chest pain, unspecified type    2. Gastroesophageal reflux disease with esophagitis        Attestations:    Christiana Adrian M.D. Please note that this dictation was completed with [employer], the computer voice recognition software. Quite often unanticipated grammatical, syntax, homophones, and other interpretive errors are inadvertently transcribed by the computer software. Please disregard these errors. Please excuse any errors that have escaped final proofreading. Thank you.

## 2019-08-11 NOTE — DISCHARGE INSTRUCTIONS

## 2019-08-12 LAB
ATRIAL RATE: 87 BPM
CALCULATED P AXIS, ECG09: 60 DEGREES
CALCULATED R AXIS, ECG10: 0 DEGREES
CALCULATED T AXIS, ECG11: 43 DEGREES
DIAGNOSIS, 93000: NORMAL
P-R INTERVAL, ECG05: 156 MS
Q-T INTERVAL, ECG07: 378 MS
QRS DURATION, ECG06: 102 MS
QTC CALCULATION (BEZET), ECG08: 454 MS
VENTRICULAR RATE, ECG03: 87 BPM

## 2019-09-10 ENCOUNTER — TELEPHONE (OUTPATIENT)
Dept: INTERNAL MEDICINE CLINIC | Age: 69
End: 2019-09-10

## 2019-09-10 NOTE — TELEPHONE ENCOUNTER
Identified patient 2 identifiers verified.  Patient confirmed appointment on 9/13/19 with Dr. Donald Murguia.

## 2019-09-12 RX ORDER — METOPROLOL SUCCINATE 50 MG/1
TABLET, EXTENDED RELEASE ORAL
Qty: 90 TAB | Refills: 0 | Status: SHIPPED | OUTPATIENT
Start: 2019-09-12 | End: 2019-09-13 | Stop reason: SDUPTHER

## 2019-09-13 ENCOUNTER — OFFICE VISIT (OUTPATIENT)
Dept: INTERNAL MEDICINE CLINIC | Age: 69
End: 2019-09-13

## 2019-09-13 VITALS
DIASTOLIC BLOOD PRESSURE: 77 MMHG | BODY MASS INDEX: 26.46 KG/M2 | WEIGHT: 189 LBS | SYSTOLIC BLOOD PRESSURE: 138 MMHG | TEMPERATURE: 98.2 F | HEIGHT: 71 IN | RESPIRATION RATE: 16 BRPM | OXYGEN SATURATION: 97 % | HEART RATE: 68 BPM

## 2019-09-13 DIAGNOSIS — Z00.00 MEDICARE ANNUAL WELLNESS VISIT, SUBSEQUENT: Primary | ICD-10-CM

## 2019-09-13 DIAGNOSIS — M54.9 CHRONIC BACK PAIN, UNSPECIFIED BACK LOCATION, UNSPECIFIED BACK PAIN LATERALITY: ICD-10-CM

## 2019-09-13 DIAGNOSIS — Z12.5 SPECIAL SCREENING FOR MALIGNANT NEOPLASM OF PROSTATE: ICD-10-CM

## 2019-09-13 DIAGNOSIS — Z13.6 SCREENING FOR CARDIOVASCULAR CONDITION: ICD-10-CM

## 2019-09-13 DIAGNOSIS — G89.29 CHRONIC BACK PAIN, UNSPECIFIED BACK LOCATION, UNSPECIFIED BACK PAIN LATERALITY: ICD-10-CM

## 2019-09-13 DIAGNOSIS — I10 ESSENTIAL HYPERTENSION: ICD-10-CM

## 2019-09-13 DIAGNOSIS — Z87.891 PERSONAL HISTORY OF TOBACCO USE, PRESENTING HAZARDS TO HEALTH: ICD-10-CM

## 2019-09-13 DIAGNOSIS — R19.5 GREEN STOOL: ICD-10-CM

## 2019-09-13 DIAGNOSIS — K21.9 GASTROESOPHAGEAL REFLUX DISEASE WITHOUT ESOPHAGITIS: ICD-10-CM

## 2019-09-13 DIAGNOSIS — Z12.11 COLON CANCER SCREENING: ICD-10-CM

## 2019-09-13 DIAGNOSIS — F17.211 NICOTINE DEPENDENCE, CIGARETTES, IN REMISSION: ICD-10-CM

## 2019-09-13 DIAGNOSIS — Z11.59 NEED FOR HEPATITIS C SCREENING TEST: ICD-10-CM

## 2019-09-13 DIAGNOSIS — Z13.6 SCREENING FOR ISCHEMIC HEART DISEASE: ICD-10-CM

## 2019-09-13 DIAGNOSIS — Z13.31 SCREENING FOR DEPRESSION: ICD-10-CM

## 2019-09-13 DIAGNOSIS — Z13.39 SCREENING FOR ALCOHOLISM: ICD-10-CM

## 2019-09-13 RX ORDER — METOPROLOL SUCCINATE 50 MG/1
50 TABLET, EXTENDED RELEASE ORAL DAILY
Qty: 90 TAB | Refills: 1 | Status: SHIPPED | OUTPATIENT
Start: 2019-09-13 | End: 2020-03-25

## 2019-09-13 RX ORDER — METHOCARBAMOL 500 MG/1
500 TABLET, FILM COATED ORAL 4 TIMES DAILY
Qty: 20 TAB | Refills: 1 | Status: SHIPPED | OUTPATIENT
Start: 2019-09-13 | End: 2020-04-20 | Stop reason: SDUPTHER

## 2019-09-13 RX ORDER — PHENOL/SODIUM PHENOLATE
20 AEROSOL, SPRAY (ML) MUCOUS MEMBRANE DAILY
Qty: 20 TAB | Refills: 1 | Status: SHIPPED | OUTPATIENT
Start: 2019-09-13 | End: 2020-04-20 | Stop reason: SDUPTHER

## 2019-09-13 NOTE — PROGRESS NOTES
SUBJECTIVE:   Mr. Isela Cheema is a 68 y.o. male who is here c/o unusual bowel movements. Green Stool: Pt c/o unusual bowel movements. He notes that his stool has been pale green for approximately 5-6 days. He notes that he has a BM every day. He is unsure if something he drank or ate had dye in it. Pt endorses urinary urgency with irregular flow. Pt denies diarrhea, abdominal pain, or bloody stools. HTN: Pt's BP in the office today was 138/77. Pt is compliant in taking Toprol-XL, amlodipine, and lisinopril. Patient denies chest pain, LOWE/SOB, edema, headache, visual changes, dizziness, palpitations or syncope. Pt requested a refill of Toprol-XL    Chronic Back Pain: Pt requested a refill of Robaxin. GERD: Pt mentioned that he recently went to the ED (8/11/2019) because he was having difficulty breathing fully. He notes that he was experiencing reflux along with tightness in his back and neck that was affecting his breathing. His reflux has calmed down since he went to the ED and has cut out hot sauce and has tried to switch to decaf coffee. PREVENTIVE:  Colonoscopy: Referred. AAA: Ordered. PSA: Ordered. Pneumonia vaccine: UTD (9/28/2017)  Shingrix: Prescribed  Eye Exam: Pt started eye drops 2 mo ago. Monitored every 6 mo by ophthalmologist. Pavel Moore are present. Pt notes that the vision in his left eye is not as clear. Pt reports that he stopped smoking 1 year ago. Pt notes that he started smoking when he was 20. He smoked 1 ppd and did not inhale. Pt denies SOB or cough. At this time, he is otherwise doing well and has brought no other complaints to my attention today. PMH:   Past Medical History:   Diagnosis Date    Arthritis     DJD (degenerative joint disease) of hip     Hypertension      PSH:  has a past surgical history that includes hx orthopaedic (2004). All: has No Known Allergies.    MEDS:   Current Outpatient Medications   Medication Sig    methocarbamol (ROBAXIN) 500 mg tablet Take 1 Tab by mouth four (4) times daily.  metoprolol succinate (TOPROL-XL) 50 mg XL tablet Take 1 Tab by mouth daily.  Omeprazole delayed release (PRILOSEC D/R) 20 mg tablet Take 1 Tab by mouth daily.  pneumococcal 23-solomon ps vaccine (PNEUMOVAX 23) 25 mcg/0.5 mL injection 0.5 mL by IntraMUSCular route once for 1 dose.  varicella-zoster recombinant, PF, (SHINGRIX, PF,) 50 mcg/0.5 mL susr injection 0.5mL by IntraMUSCular route once now and then repeat in 2-6 months    amLODIPine (NORVASC) 10 mg tablet TAKE 1 TABLET BY MOUTH DAILY    lisinopril (PRINIVIL, ZESTRIL) 20 mg tablet TAKE 1 AND ONE-HALF TABLET BY MOUTH DAILY     No current facility-administered medications for this visit. FH: family history includes Coronary Artery Disease in his maternal grandfather and maternal grandmother; Hypertension in his mother and sister. SH:  reports that he has quit smoking. He has a 20.00 pack-year smoking history. He has never used smokeless tobacco. He reports that he drinks alcohol. He reports that he does not use drugs. Review of Systems - History obtained from the patient  General ROS: negative  Psychological ROS: negative  Ophthalmic ROS: negative  ENT ROS: negative  Respiratory ROS: no cough, shortness of breath, or wheezing  Cardiovascular ROS: no chest pain or dyspnea on exertion  Gastrointestinal ROS: +green stool, heartburn. no abdominal pain, change in bowel habits, or black or bloody stools  Genito-Urinary ROS: +urgency, irregular flow. Musculoskeletal ROS: negative  Neurological ROS: negative  Dermatological ROS: negative    OBJECTIVE:   Vitals:   Visit Vitals  /77 (BP 1 Location: Left arm, BP Patient Position: Sitting)   Pulse 68   Temp 98.2 °F (36.8 °C) (Oral)   Resp 16   Ht 5' 11\" (1.803 m)   Wt 189 lb (85.7 kg)   SpO2 97%   BMI 26.36 kg/m²      Gen: Pleasant 68 y.o.  male in NAD. HEENT: NC/AT. HEART: RRR, No M/G/R.   LUNGS: CTAB No W/R. EXTREMITIES: Warm.  No C/C/E.   NEURO: Alert and oriented x 3. Cranial nerves grossly intact. No focal sensory or motor deficits noted. SKIN: Warm. Dry. No rashes or other lesions noted. ABDOMEN:  +TTP LLQ, mild epigastric discomfort. S, ND, BS+. ASSESSMENT/ PLAN:     Diagnoses and all orders for this visit:    1. Medicare annual wellness visit, subsequent  -     pneumococcal 23-solomon ps vaccine (PNEUMOVAX 23) 25 mcg/0.5 mL injection; 0.5 mL by IntraMUSCular route once for 1 dose.  -     varicella-zoster recombinant, PF, (SHINGRIX, PF,) 50 mcg/0.5 mL susr injection; 0.5mL by IntraMUSCular route once now and then repeat in 2-6 months    2. Essential hypertension  -     metoprolol succinate (TOPROL-XL) 50 mg XL tablet; Take 1 Tab by mouth daily.  -     METABOLIC PANEL, COMPREHENSIVE    3. Gastroesophageal reflux disease without esophagitis  -     Omeprazole delayed release (PRILOSEC D/R) 20 mg tablet; Take 1 Tab by mouth daily. 4. Chronic back pain, unspecified back location, unspecified back pain laterality  -     methocarbamol (ROBAXIN) 500 mg tablet; Take 1 Tab by mouth four (4) times daily. 5. Green stool  -     METABOLIC PANEL, COMPREHENSIVE  -     CBC WITH AUTOMATED DIFF    6. Screening for alcoholism  -     AL ANNUAL ALCOHOL SCREEN 15 MIN    7. Screening for depression  -     DEPRESSION SCREEN ANNUAL    8. Screening for ischemic heart disease  -     LIPID PANEL    9. Need for hepatitis C screening test  -     HEPATITIS C AB    10. Personal history of tobacco use, presenting hazards to health  -     CT LOW DOSE LUNG CANCER SCREENING; Future    11. Nicotine dependence, cigarettes, in remission    12. Special screening for malignant neoplasm of prostate  -     PSA SCREENING (SCREENING)    13. Screening for cardiovascular condition  -     LIPID PANEL  -     US EXAM SCREENING AAA; Future    14. Colon cancer screening  -     REFERRAL TO GASTROENTEROLOGY      1. Medicare Annual Wellness Visit  See attached note.  I prescribed Pneumovax booster and Shingrix for health maintenance. 2. Hypertension  BP seems to be well controlled. I recommended continuing current dose of Toprol-XL, amlodipine, and lisinopril, eating a low sodium diet, and increasing exercise. Recheck CMP. 3. GERD  I continued Prilosec D/R for sx relief. 4. Chronic Back Pain  I continued Robaxin for pain management. 5. Green Stool  I ordered a CBC to r/o out anemia and CMP to check liver and bilirubin levels. 6. Screening for Alcoholism  See attached note. 7. Screening for Depression  See attached note. 8. Screening for Ischemic Heart Disease  Recheck lipid panel. 9. Need for Hep C Screening Test  I ordered a Hep C AB for health maintenance. 10. Personal History of Tobacco Abuse  I ordered a low dose CT scan to monitor lung health. 11. Nicotine Dependence, cigarettes, in remission    12. Special Screening for Malignant Neoplasm of Prostate  I ordered a PSA for health maintenance. 13. Screening for Cardiovascular Condition  I ordered a lipid panel and AAA screening for health maintenance. Follow-up and Dispositions    · Return in about 6 months (around 3/13/2020) for follow up. I have reviewed the patient's medications and risks/side effects/benefits were discussed. Diagnosis(-es) explained to patient and questions answered. Literature provided where appropriate.      Written by Kendall Jimenes, as dictated by Liliam Robb MD.

## 2019-09-13 NOTE — PROGRESS NOTES
Identified pt with two pt identifiers(name and ). Reviewed record in preparation for visit and have obtained necessary documentation. Chief Complaint   Patient presents with    Other     Pt reports green stool for abot 5-6 day         Visit Vitals  /77 (BP 1 Location: Left arm, BP Patient Position: Sitting)   Pulse 68   Temp 98.2 °F (36.8 °C) (Oral)   Resp 16   Ht 5' 11\" (1.803 m)   Wt 189 lb (85.7 kg)   SpO2 97%   BMI 26.36 kg/m²       Health Maintenance Due   Topic    Hepatitis C Screening     DTaP/Tdap/Td series (1 - Tdap)    Shingrix Vaccine Age 50> (1 of 2)    FOBT Q 1 YEAR AGE 54-65     AAA Screening 73-67 YO Male Smoking Patients     MEDICARE YEARLY EXAM     Influenza Age 5 to Adult        Med Reconciliation: Completed    Coordination of Care Questionnaire:  :   1) Have you been to an emergency room, urgent care, or hospitalized since your last visit? If yes, where when, and reason for visit? Yes, ED [facility] ER on 19 for chest pain, see Encounters       2. Have seen or consulted any other health care provider since your last visit? If yes, where when, and reason for visit? No       3) Do you have an Advanced Directive/ Living Will in place? No  If yes, do we have a copy on file   If no, would you like information       Patient is accompanied by self I have received verbal consent from Mehreen Bowels to discuss any/all medical information while they are present in the room.

## 2019-09-13 NOTE — PROGRESS NOTES
This is the Subsequent Medicare Annual Wellness Exam, performed 12 months or more after the Initial AWV or the last Subsequent AWV    I have reviewed the patient's medical history in detail and updated the computerized patient record. History     Past Medical History:   Diagnosis Date    Arthritis     DJD (degenerative joint disease) of hip     Hypertension       Past Surgical History:   Procedure Laterality Date    HX ORTHOPAEDIC  2004    Total R hip replacement     Current Outpatient Medications   Medication Sig Dispense Refill    methocarbamol (ROBAXIN) 500 mg tablet Take 1 Tab by mouth four (4) times daily. 20 Tab 1    metoprolol succinate (TOPROL-XL) 50 mg XL tablet Take 1 Tab by mouth daily. 90 Tab 1    Omeprazole delayed release (PRILOSEC D/R) 20 mg tablet Take 1 Tab by mouth daily. 20 Tab 1    pneumococcal 23-solomon ps vaccine (PNEUMOVAX 23) 25 mcg/0.5 mL injection 0.5 mL by IntraMUSCular route once for 1 dose.  0.5 mL 0    varicella-zoster recombinant, PF, (SHINGRIX, PF,) 50 mcg/0.5 mL susr injection 0.5mL by IntraMUSCular route once now and then repeat in 2-6 months 0.5 mL 1    amLODIPine (NORVASC) 10 mg tablet TAKE 1 TABLET BY MOUTH DAILY 90 Tab 11    lisinopril (PRINIVIL, ZESTRIL) 20 mg tablet TAKE 1 AND ONE-HALF TABLET BY MOUTH DAILY 135 Tab 1     No Known Allergies  Family History   Problem Relation Age of Onset    Hypertension Mother     Coronary Artery Disease Maternal Grandmother     Coronary Artery Disease Maternal Grandfather     Hypertension Sister      Social History     Tobacco Use    Smoking status: Former Smoker     Packs/day: 1.00     Years: 20.00     Pack years: 20.00    Smokeless tobacco: Never Used    Tobacco comment: doesn't inhale   Substance Use Topics    Alcohol use: Yes     Comment: occasionally cachorro     Patient Active Problem List   Diagnosis Code    Abnormal EKG R94.31    DJD (degenerative joint disease) of hip M16.9    Ejection fraction < 50% R94.30    HTN (hypertension) I10       Depression Risk Factor Screening:     3 most recent PHQ Screens 9/13/2019   Little interest or pleasure in doing things Not at all   Feeling down, depressed, irritable, or hopeless Not at all   Total Score PHQ 2 0     Alcohol Risk Factor Screening: You do not drink alcohol or very rarely. Functional Ability and Level of Safety:   Hearing Loss  Hearing is good. Activities of Daily Living  The home contains: no safety equipment. Patient does total self care    Fall Risk  Fall Risk Assessment, last 12 mths 9/13/2019   Able to walk? Yes   Fall in past 12 months? No       Abuse Screen  Patient is not abused    Cognitive Screening   Evaluation of Cognitive Function:  Has your family/caregiver stated any concerns about your memory: no  Normal    Patient Care Team   Patient Care Team:  David Joyner MD as PCP - [facility])  Chavez White MD (Ophthalmology)  Migue Sanford MD as Physician (Cardiology)    Assessment/Plan   Education and counseling provided:  Are appropriate based on today's review and evaluation  End-of-Life planning (with patient's consent)  Pneumococcal Vaccine  Prostate cancer screening tests (PSA, covered annually)  Colorectal cancer screening tests  Cardiovascular screening blood test  Screening for glaucoma    Diagnoses and all orders for this visit:    1. Medicare annual wellness visit, subsequent  -     pneumococcal 23-solomon ps vaccine (PNEUMOVAX 23) 25 mcg/0.5 mL injection; 0.5 mL by IntraMUSCular route once for 1 dose.  -     varicella-zoster recombinant, PF, (SHINGRIX, PF,) 50 mcg/0.5 mL susr injection; 0.5mL by IntraMUSCular route once now and then repeat in 2-6 months    2. Essential hypertension  -     metoprolol succinate (TOPROL-XL) 50 mg XL tablet; Take 1 Tab by mouth daily.  -     METABOLIC PANEL, COMPREHENSIVE    3.  Gastroesophageal reflux disease without esophagitis  -     Omeprazole delayed release (PRILOSEC D/R) 20 mg tablet; Take 1 Tab by mouth daily. 4. Chronic back pain, unspecified back location, unspecified back pain laterality  -     methocarbamol (ROBAXIN) 500 mg tablet; Take 1 Tab by mouth four (4) times daily. 5. Green stool  -     METABOLIC PANEL, COMPREHENSIVE  -     CBC WITH AUTOMATED DIFF    6. Screening for alcoholism  -     [place] ANNUAL ALCOHOL SCREEN 15 MIN    7. Screening for depression  -     DEPRESSION SCREEN ANNUAL    8. Screening for ischemic heart disease  -     LIPID PANEL    9. Need for hepatitis C screening test  -     HEPATITIS C AB    10. Personal history of tobacco use, presenting hazards to health  -     CT LOW DOSE LUNG CANCER SCREENING; Future    11. Nicotine dependence, cigarettes, in remission    12. Special screening for malignant neoplasm of prostate  -     PSA SCREENING (SCREENING)    13. Screening for cardiovascular condition  -     LIPID PANEL  -     US EXAM SCREENING AAA; Future        Health Maintenance Due   Topic Date Due    Hepatitis C Screening  1950    DTaP/Tdap/Td series (1 - Tdap) 02/13/1969    Shingrix Vaccine Age 50> (1 of 2) 02/13/1998    FOBT Q 1 YEAR AGE 50-75  02/13/1998    AAA Screening 73-69 YO Male Smoking Patients  02/13/2013    MEDICARE YEARLY EXAM  05/17/2019    Influenza Age 9 to Adult  08/01/2019       PREVENTIVE:  Colonoscopy: Referred. AAA: Ordered. PSA: Ordered. Pneumonia vaccine: UTD (9/28/2017)  Shingrix: Prescribed  Eye Exam: Pt started eye drops 2 mo ago. Monitored every 6 mo by ophthalmologist. Rao Stammer are present. Pt notes that the vision in his left eye is not as clear. ACP-ACP planning continued today. I explained the purpose of the advance medical directive. Patient expressed interest in reviewing material.  I provided the patient with a \"Your Right to Decide\" booklet, [employer] Kit, and a copy of an Advance Directive.   Patient agrees to providing a copy to the office when available.           Discussed with patient current guidelines for screening for lung cancer. Current recommendations are to obtain yearly screening LDCT yearly for age 46-80, or until smoke free for 15 years. Patient has 45 pack year history of cigarette smoking and currently does not smoke. Discussed with patient risks and benefits of screening, including over-diagnosis, false positive rate, and total radiation exposure. Patient currently exhibits no signs or symptoms suggestive of lung cancer. Discussed with patient importance of compliance with yearly annual lung cancer screenings and willingness to undergo diagnosis and treatment if screening scan is positive. In addition, patient was counseled regarding (remaining smoke free/total smoking cessation).

## 2019-09-13 NOTE — PATIENT INSTRUCTIONS
Medicare Wellness Visit, Male    The best way to live healthy is to have a lifestyle where you eat a well-balanced diet, exercise regularly, limit alcohol use, and quit all forms of tobacco/nicotine, if applicable. Regular preventive services are another way to keep healthy. Preventive services (vaccines, screening tests, monitoring & exams) can help personalize your care plan, which helps you manage your own care. Screening tests can find health problems at the earliest stages, when they are easiest to treat. [facility] follows the current, evidence-based guidelines published by the [facility] ([facility]) when recommending preventive services for our patients. Because we follow these guidelines, sometimes recommendations change over time as research supports it. (For example, a prostate screening blood test is no longer routinely recommended for men with no symptoms.)  Of course, you and your doctor may decide to screen more often for some diseases, based on your risk and co-morbidities (chronic disease you are already diagnosed with). Preventive services for you include:  - Medicare offers their members a free annual wellness visit, which is time for you and your primary care provider to discuss and plan for your preventive service needs. Take advantage of this benefit every year!  -All adults over age 72 should receive the recommended pneumonia vaccines. Current USPSTF guidelines recommend a series of two vaccines for the best pneumonia protection.   -All adults should have a flu vaccine yearly and an ECG.  All adults age 61 and older should receive a shingles vaccine once in their lifetime.    -All adults age 38-68 who are overweight should have a diabetes screening test once every three years.   -Other screening tests & preventive services for persons with diabetes include: an eye exam to screen for diabetic retinopathy, a kidney function test, a foot exam, and stricter control over your cholesterol.   -Cardiovascular screening for adults with routine risk involves an electrocardiogram (ECG) at intervals determined by the provider.   -Colorectal cancer screening should be done for adults age 54-65 with no increased risk factors for colorectal cancer. There are a number of acceptable methods of screening for this type of cancer. Each test has its own benefits and drawbacks. Discuss with your provider what is most appropriate for you during your annual wellness visit. The different tests include: colonoscopy (considered the best screening method), a fecal occult blood test, a fecal DNA test, and sigmoidoscopy.  -All adults born between [facility] should be screened once for Hepatitis C.  -An Abdominal Aortic Aneurysm (AAA) Screening is recommended for men age 73-68 who has ever smoked in their lifetime. Here is a list of your current Health Maintenance items (your personalized list of preventive services) with a due date:  Health Maintenance Due   Topic Date Due    Hepatitis C Test  1950    DTaP/Tdap/Td  (1 - Tdap) 02/13/1969    Shingles Vaccine (1 of 2) 02/13/1998    Stool testing for trace blood  02/13/1998    AAA Screening  02/13/2013    Annual Well Visit  05/17/2019    Flu Vaccine  08/01/2019     Medicare provides coverage of an annual preventive prostate cancer screening PSA blood test and DM once every 12 months for all male beneficiaries aged 48 and older (coverage begins the day after the beneficiarys 50th birthday), if at least 11 months have passed following the month in which the last Medicare-covered screening PSA blood test or DM was performed for the early detection of prostate cancer.

## 2019-09-14 LAB
ALBUMIN SERPL-MCNC: 4.6 G/DL (ref 3.5–4.8)
ALBUMIN/GLOB SERPL: 1.9 {RATIO} (ref 1.2–2.2)
ALP SERPL-CCNC: 78 IU/L (ref 39–117)
ALT SERPL-CCNC: 12 IU/L (ref 0–44)
AST SERPL-CCNC: 15 IU/L (ref 0–40)
BASOPHILS # BLD AUTO: 0 X10E3/UL (ref 0–0.2)
BASOPHILS NFR BLD AUTO: 0 %
BILIRUB SERPL-MCNC: 0.4 MG/DL (ref 0–1.2)
BUN SERPL-MCNC: 20 MG/DL (ref 8–27)
BUN/CREAT SERPL: 18 (ref 10–24)
CALCIUM SERPL-MCNC: 9.4 MG/DL (ref 8.6–10.2)
CHLORIDE SERPL-SCNC: 103 MMOL/L (ref 96–106)
CHOLEST SERPL-MCNC: 221 MG/DL (ref 100–199)
CO2 SERPL-SCNC: 24 MMOL/L (ref 20–29)
CREAT SERPL-MCNC: 1.1 MG/DL (ref 0.76–1.27)
EOSINOPHIL # BLD AUTO: 0.3 X10E3/UL (ref 0–0.4)
EOSINOPHIL NFR BLD AUTO: 4 %
ERYTHROCYTE [DISTWIDTH] IN BLOOD BY AUTOMATED COUNT: 12.8 % (ref 12.3–15.4)
GLOBULIN SER CALC-MCNC: 2.4 G/DL (ref 1.5–4.5)
GLUCOSE SERPL-MCNC: 103 MG/DL (ref 65–99)
HCT VFR BLD AUTO: 37 % (ref 37.5–51)
HCV AB S/CO SERPL IA: <0.1 S/CO RATIO (ref 0–0.9)
HDLC SERPL-MCNC: 50 MG/DL
HGB BLD-MCNC: 12.4 G/DL (ref 13–17.7)
IMM GRANULOCYTES # BLD AUTO: 0 X10E3/UL (ref 0–0.1)
IMM GRANULOCYTES NFR BLD AUTO: 0 %
LDLC SERPL CALC-MCNC: 144 MG/DL (ref 0–99)
LYMPHOCYTES # BLD AUTO: 1.7 X10E3/UL (ref 0.7–3.1)
LYMPHOCYTES NFR BLD AUTO: 28 %
MCH RBC QN AUTO: 32 PG (ref 26.6–33)
MCHC RBC AUTO-ENTMCNC: 33.5 G/DL (ref 31.5–35.7)
MCV RBC AUTO: 95 FL (ref 79–97)
MONOCYTES # BLD AUTO: 0.5 X10E3/UL (ref 0.1–0.9)
MONOCYTES NFR BLD AUTO: 9 %
NEUTROPHILS # BLD AUTO: 3.5 X10E3/UL (ref 1.4–7)
NEUTROPHILS NFR BLD AUTO: 59 %
PLATELET # BLD AUTO: 247 X10E3/UL (ref 150–450)
POTASSIUM SERPL-SCNC: 4.1 MMOL/L (ref 3.5–5.2)
PROT SERPL-MCNC: 7 G/DL (ref 6–8.5)
PSA SERPL-MCNC: 1.8 NG/ML (ref 0–4)
RBC # BLD AUTO: 3.88 X10E6/UL (ref 4.14–5.8)
SODIUM SERPL-SCNC: 142 MMOL/L (ref 134–144)
TRIGL SERPL-MCNC: 133 MG/DL (ref 0–149)
VLDLC SERPL CALC-MCNC: 27 MG/DL (ref 5–40)
WBC # BLD AUTO: 5.9 X10E3/UL (ref 3.4–10.8)

## 2019-09-25 ENCOUNTER — TELEPHONE (OUTPATIENT)
Dept: INTERNAL MEDICINE CLINIC | Age: 69
End: 2019-09-25

## 2019-09-25 NOTE — TELEPHONE ENCOUNTER
Identified patient 2 identifiers verified. Patient will be seen by Cardiology already has an appointment , he wanted to know the reason  for the Ultra sound and t CT low dose.

## 2019-09-25 NOTE — TELEPHONE ENCOUNTER
#[phone] pt has an important question to ask Dr. Vamshi Tate.  This is all he wanted to give.   Please have Dr. Vamshi Tate call pt on Friday, 9-27-19

## 2019-09-25 NOTE — TELEPHONE ENCOUNTER
Called, no answer left message to call office back. Left message on v/m why he was ordered a CT and ultrasound.

## 2019-09-25 NOTE — TELEPHONE ENCOUNTER
He has a long smoking history. The CT is a screen for lung cancer and the ultrasound evaluates his aorta.

## 2019-10-01 ENCOUNTER — OFFICE VISIT (OUTPATIENT)
Dept: CARDIOLOGY CLINIC | Age: 69
End: 2019-10-01

## 2019-10-01 VITALS
DIASTOLIC BLOOD PRESSURE: 70 MMHG | WEIGHT: 189 LBS | HEART RATE: 56 BPM | SYSTOLIC BLOOD PRESSURE: 134 MMHG | OXYGEN SATURATION: 98 % | BODY MASS INDEX: 26.46 KG/M2 | HEIGHT: 71 IN | RESPIRATION RATE: 18 BRPM

## 2019-10-01 DIAGNOSIS — E78.2 MIXED HYPERLIPIDEMIA: ICD-10-CM

## 2019-10-01 DIAGNOSIS — I50.22 SYSTOLIC CHF, CHRONIC (HCC): Primary | ICD-10-CM

## 2019-10-01 DIAGNOSIS — R07.89 ATYPICAL CHEST PAIN: ICD-10-CM

## 2019-10-01 DIAGNOSIS — I10 ESSENTIAL HYPERTENSION: ICD-10-CM

## 2019-10-01 RX ORDER — LATANOPROST 50 UG/ML
1 SOLUTION/ DROPS OPHTHALMIC
COMMUNITY

## 2019-10-01 NOTE — PROGRESS NOTES
1. Have you been to the ER, urgent care clinic since your last visit? Hospitalized since your last visit? ED [facility] ER Aug 2019, GERD. 2. Have you seen or consulted any other health care providers outside of the [address] [place] [address] since your last visit? Include any pap smears or colon screening. No    Chief Complaint   Patient presents with    Hypertension     Yearly appt. Denied cardiac symptoms.

## 2019-10-01 NOTE — PROGRESS NOTES
[address], [address] [facility]  [phone]     Subjective:      Donna Stack is a 68 y.o. male is here for routine f/u. Seen in the ED 8/11/19 c/o substernal chest discomfort radiating around his chest x 3 days. Cardiac work up -ve. His symptom resolved with GI cocktail and was dc on PPI. He took Omeprazole as needed and it helps him when he takes it. Today he reports another episode of chest fullness yesterday, felt like something is stuck in his chest, took one PPI and it helped him. He denies sob or sanchez, able to walk 8 laps with no cp or sanchez, and he lifts weight. The patient denies  shortness of breath, orthopnea, PND, LE edema, palpitations, syncope, or presyncope.        Patient Active Problem List    Diagnosis Date Noted    Ejection fraction < 50% 01/28/2015    HTN (hypertension) 01/28/2015    DJD (degenerative joint disease) of hip 06/02/2014    Abnormal EKG 05/01/2014      Lili Lovell MD  Past Medical History:   Diagnosis Date    Arthritis     DJD (degenerative joint disease) of hip     Hypertension       Past Surgical History:   Procedure Laterality Date    HX ORTHOPAEDIC  2004    Total R hip replacement     No Known Allergies   Family History   Problem Relation Age of Onset    Hypertension Mother     Coronary Artery Disease Maternal Grandmother     Coronary Artery Disease Maternal Grandfather     Hypertension Sister       Social History     Socioeconomic History    Marital status: SINGLE     Spouse name: Not on file    Number of children: Not on file    Years of education: Not on file    Highest education level: Not on file   Occupational History    Not on file   Social Needs    Financial resource strain: Not on file    Food insecurity:     Worry: Not on file     Inability: Not on file    Transportation needs:     Medical: Not on file     Non-medical: Not on file   Tobacco Use    Smoking status: Former Smoker     Packs/day: 1.00 Years: 20.00     Pack years: 20.00     Last attempt to quit: 10/1/2017     Years since quittin.0    Smokeless tobacco: Never Used    Tobacco comment: doesn't inhale   Substance and Sexual Activity    Alcohol use: Yes     Comment: occasionally cachorro    Drug use: No    Sexual activity: Not Currently   Lifestyle    Physical activity:     Days per week: Not on file     Minutes per session: Not on file    Stress: Not on file   Relationships    Social connections:     Talks on phone: Not on file     Gets together: Not on file     Attends [employer] service: Not on file     Active member of club or organization: Not on file     Attends meetings of clubs or organizations: Not on file     Relationship status: Not on file    Intimate partner violence:     Fear of current or ex partner: Not on file     Emotionally abused: Not on file     Physically abused: Not on file     Forced sexual activity: Not on file   Other Topics Concern    Not on file   Social History Narrative    Not on file      Current Outpatient Medications   Medication Sig    latanoprost (XALATAN) 0.005 % ophthalmic solution Administer 1 Drop to both eyes nightly.  methocarbamol (ROBAXIN) 500 mg tablet Take 1 Tab by mouth four (4) times daily.  metoprolol succinate (TOPROL-XL) 50 mg XL tablet Take 1 Tab by mouth daily.  Omeprazole delayed release (PRILOSEC D/R) 20 mg tablet Take 1 Tab by mouth daily.  amLODIPine (NORVASC) 10 mg tablet TAKE 1 TABLET BY MOUTH DAILY    lisinopril (PRINIVIL, ZESTRIL) 20 mg tablet TAKE 1 AND ONE-HALF TABLET BY MOUTH DAILY     No current facility-administered medications for this visit.           Review of Symptoms:  11 systems reviewed, negative other than as stated in the HPI    Physical ExamPhysical Exam:    Vitals:    10/01/19 0919 10/01/19 0927 10/01/19 0941   BP: 154/76 138/70 134/70   Pulse: (!) 56     Resp: 18     SpO2: 98%     Weight: 189 lb (85.7 kg)     Height: 5' 11\" (1.803 m)       Body mass index is 26.36 kg/m². General PE  Gen:  NAD  Mental Status - Alert. General Appearance - Not in acute distress. HEENT:  PERRL, no carotid bruits or JVD  Chest and Lung Exam   Inspection: Accessory muscles - No use of accessory muscles in breathing. Auscultation:   Breath sounds: - Normal.   Cardiovascular   Inspection: Jugular vein - Bilateral - Inspection Normal.   Palpation/Percussion:   Apical Impulse: - Normal.   Auscultation: Rhythm - Regular. Heart Sounds - S1 WNL and S2 WNL. No S3 or S4. Murmurs & Other Heart Sounds: Auscultation of the heart reveals - No Murmurs. Peripheral Vascular   Upper Extremity: Inspection - Bilateral - No Cyanotic nailbeds or Digital clubbing. Lower Extremity:   Palpation: Edema - Bilateral - No edema. Abdomen:   Soft, non-tender, bowel sounds are active.   Neuro: A&O times 3, CN and motor grossly WNL    Labs:   Lab Results   Component Value Date/Time    Cholesterol, total 221 (H) 09/13/2019 10:14 AM    Cholesterol, total 230 (H) 12/05/2018 01:14 PM    Cholesterol, total 168 03/07/2016 01:53 PM    Cholesterol, total 213 (H) 01/22/2015 09:28 AM    Cholesterol, total 216 (H) 05/14/2014 09:08 AM    HDL Cholesterol 50 09/13/2019 10:14 AM    HDL Cholesterol 55 12/05/2018 01:14 PM    HDL Cholesterol 55 03/07/2016 01:53 PM    HDL Cholesterol 60 01/22/2015 09:28 AM    HDL Cholesterol 50 05/14/2014 09:08 AM    LDL, calculated 144 (H) 09/13/2019 10:14 AM    LDL, calculated 145 (H) 12/05/2018 01:14 PM    LDL, calculated 91 03/07/2016 01:53 PM    LDL, calculated 131 (H) 01/22/2015 09:28 AM    LDL, calculated 132 (H) 05/14/2014 09:08 AM    Triglyceride 133 09/13/2019 10:14 AM    Triglyceride 148 12/05/2018 01:14 PM    Triglyceride 110 03/07/2016 01:53 PM    Triglyceride 109 01/22/2015 09:28 AM    Triglyceride 168 (H) 05/14/2014 09:08 AM     Lab Results   Component Value Date/Time     08/11/2019 05:12 PM     Lab Results   Component Value Date/Time    Sodium 142 09/13/2019 10:14 AM Potassium 4.1 09/13/2019 10:14 AM    Chloride 103 09/13/2019 10:14 AM    CO2 24 09/13/2019 10:14 AM    Anion gap 6 08/11/2019 05:12 PM    Glucose 103 (H) 09/13/2019 10:14 AM    BUN 20 09/13/2019 10:14 AM    Creatinine 1.10 09/13/2019 10:14 AM    BUN/Creatinine ratio 18 09/13/2019 10:14 AM    GFR est AA 78 09/13/2019 10:14 AM    GFR est non-AA 67 09/13/2019 10:14 AM    Calcium 9.4 09/13/2019 10:14 AM    Bilirubin, total 0.4 09/13/2019 10:14 AM    AST (SGOT) 15 09/13/2019 10:14 AM    Alk. phosphatase 78 09/13/2019 10:14 AM    Protein, total 7.0 09/13/2019 10:14 AM    Albumin 4.6 09/13/2019 10:14 AM    Globulin 3.8 08/11/2019 05:12 PM    A-G Ratio 1.9 09/13/2019 10:14 AM    ALT (SGPT) 12 09/13/2019 10:14 AM       EKG:  SB     Assessment:     Assessment:      1. Systolic CHF, chronic ([address]    2. Essential hypertension    3. Mixed hyperlipidemia    4. Atypical chest pain        Orders Placed This Encounter    AMB POC EKG ROUTINE W/ 12 LEADS, INTER & REP     Order Specific Question:   Reason for Exam:     Answer:   routine    latanoprost (XALATAN) 0.005 % ophthalmic solution     Sig: Administer 1 Drop to both eyes nightly. Plan:     Patient presents for one year follow up. Seen in the ED 8/11/19 c/o substernal chest discomfort radiating around his chest x 3 days. Cardiac work up -ve. His symptom resolved with GI cocktail and was dc on PPI. He took Omeprazole as needed and it helps him when he takes it. Today he reports another episode of chest fullness yesterday, felt like something is stuck in his chest, took one PPI and it helped him. He denies sob or sanchez, able to walk 8 laps with no cp or sanchez, and he lifts weight. 1. Atypical CP: Normal coronaries per cardiac cath in 5/2014. Has significant risk factors, will evaluate with stress echo. 2.  Nonischemic cardiomyopathy: Ejection fraction 2017 60%. Wt stable. Continue BB, Ace-I.  3.  Hypertension: Controlled with current therapy  4.   Hyperlipidemia: 9/19 LDL at 144. Lipids and labs followed by PCP.       Continue current care and f/u when testing complete    Giulia Began, NP       [facility]    10/1/2019         Patient seen, examined by me personally. Plan discussed as detailed. Agree with note as outlined by  NP. I confirm findings in history and physical exam. No additional findings noted. Agree with plan as outlined above.      Andrea España MD

## 2019-10-02 NOTE — PROGRESS NOTES
CMP-Normal electrolyte levels except for an elevation in the glucose level, normal renal and liver function  CBC-Mild anemia  Lipids-Your current lab results reveal a elevated total cholesterol and ldl. Your total cholesterol should be under 200 and your ldl under 100. Work on following a low fat diet and exercise at least three times a week.    Hepatitis C screen-negative  PSA-normal and stable

## 2019-10-29 ENCOUNTER — HOSPITAL ENCOUNTER (OUTPATIENT)
Dept: CT IMAGING | Age: 69
Discharge: HOME | End: 2019-10-29
Attending: FAMILY MEDICINE
Payer: MEDICARE

## 2019-10-29 ENCOUNTER — HOSPITAL ENCOUNTER (OUTPATIENT)
Dept: ULTRASOUND IMAGING | Age: 69
Discharge: HOME | End: 2019-10-29
Attending: FAMILY MEDICINE
Payer: MEDICARE

## 2019-10-29 DIAGNOSIS — Z87.891 PERSONAL HISTORY OF TOBACCO USE, PRESENTING HAZARDS TO HEALTH: ICD-10-CM

## 2019-10-29 DIAGNOSIS — Z13.6 SCREENING FOR CARDIOVASCULAR CONDITION: ICD-10-CM

## 2019-10-29 PROCEDURE — G0297 LDCT FOR LUNG CA SCREEN: HCPCS

## 2019-10-29 PROCEDURE — 76706 US ABDL AORTA SCREEN AAA: CPT

## 2019-12-27 RX ORDER — LISINOPRIL 20 MG/1
TABLET ORAL
Qty: 135 TAB | Refills: 1 | Status: SHIPPED | OUTPATIENT
Start: 2019-12-27 | End: 2020-06-23

## 2020-01-14 ENCOUNTER — HOSPITAL ENCOUNTER (EMERGENCY)
Age: 70
Discharge: HOME | End: 2020-01-14
Attending: EMERGENCY MEDICINE
Payer: MEDICARE

## 2020-01-14 VITALS
SYSTOLIC BLOOD PRESSURE: 162 MMHG | HEIGHT: 71 IN | HEART RATE: 78 BPM | TEMPERATURE: 97.5 F | WEIGHT: 195.99 LBS | RESPIRATION RATE: 16 BRPM | DIASTOLIC BLOOD PRESSURE: 71 MMHG | OXYGEN SATURATION: 100 % | BODY MASS INDEX: 27.44 KG/M2

## 2020-01-14 DIAGNOSIS — T65.91XA ACCIDENTAL INGESTION OF TOXIC SUBSTANCE, INITIAL ENCOUNTER: Primary | ICD-10-CM

## 2020-01-14 PROCEDURE — 74011250637 HC RX REV CODE- 250/637: Performed by: EMERGENCY MEDICINE

## 2020-01-14 PROCEDURE — 99283 EMERGENCY DEPT VISIT LOW MDM: CPT

## 2020-01-14 RX ORDER — ONDANSETRON 4 MG/1
4 TABLET, ORALLY DISINTEGRATING ORAL
Qty: 10 TAB | Refills: 0 | Status: SHIPPED | OUTPATIENT
Start: 2020-01-14 | End: 2021-05-11

## 2020-01-14 RX ORDER — ONDANSETRON 4 MG/1
4 TABLET, ORALLY DISINTEGRATING ORAL
Status: COMPLETED | OUTPATIENT
Start: 2020-01-14 | End: 2020-01-14

## 2020-01-14 RX ADMIN — ONDANSETRON 4 MG: 4 TABLET, ORALLY DISINTEGRATING ORAL at 02:50

## 2020-01-14 NOTE — DISCHARGE INSTRUCTIONS
Patient Education        Alcohol, Drug, or Poison Ingestion: Care Instructions  Your Care Instructions    A person can become very sick, or die, from swallowing or using alcohol, drugs, or poisons. Alcohol poisoning occurs when a person drinks a large amount of alcohol. Alcohol can stop nerve signals that control breathing. It can also stop the gag reflex that prevents choking. Alcohol poisoning is serious. It can lead to brain damage or death if it's not treated right away. Drugs can be used by accident or on purpose. They can be swallowed, inhaled, injected, or absorbed through the skin. Drugs include over-the-counter medicine (such as aspirin or acetaminophen) and prescription medicine. They also include vitamins and supplements. And they include illegal drugs such as cocaine and heroin. And poisons are all around us. They include household , cosmetics, houseplants, and garden chemicals. The doctor has checked you carefully, but problems can develop later. If you notice any problems or new symptoms, get medical treatment right away. Follow-up care is a key part of your treatment and safety. Be sure to make and go to all appointments, and call your doctor if you are having problems. It's also a good idea to know your test results and keep a list of the medicines you take. How can you care for yourself at home? Alcohol problems  · Talk to your doctor or counselor about programs that can help you stop using alcohol. · Plan ways to avoid being tempted to drink. ? Get rid of all alcohol in your home. ? Avoid places where you tend to drink. ? Stay away from places or events that offer alcohol. ? Stay away from people who drink a lot. Drug problems  · Talk to your doctor about programs that can help you stop using drugs. · Get rid of any drugs you might be tempted to misuse. · Learn how to say no when other people use drugs. · Don't spend time with people who use drugs.   Poison prevention  · Keep products in the containers they came in. Keep them with the original labels. · Be careful when you use cleaning products, paints, solvents, and pesticides. Read labels before use. Use a fan to move strong odors and fumes out of your home. · Do not mix cleaning products. Try to use nontoxic . These include vinegar, lemon juice, and baking soda. When should you call for help? Poison control centers, hospitals, or your doctor can give immediate advice in the case of a poisoning. The [facility] number is [phone]. Have the poison container with you so you can give complete information to the poison control center, such as what the poison or substance is, how much was taken and when. Do not try to make the person vomit.   Call 911 anytime you think you may need emergency care. For example, call if you or someone else:    · Has used or currently uses alcohol or drugs and is very confused or can't stay awake.     · Has passed out (lost consciousness).     · Has severe trouble breathing.     · Is having a seizure.    Call your doctor now or seek immediate medical care if you or someone else:    · Has new symptoms, or is not acting normally.    Watch closely for changes in your health, and be sure to contact your doctor if:    · You do not get better as expected.     · You need help with drug or alcohol problems.     · You have problems with depression or other mental health issues. Where can you learn more? Go to http://darryl-titus.info/. Enter A588 in the search box to learn more about \"Alcohol, Drug, or Poison Ingestion: Care Instructions. \"  Current as of: June 26, 2019  Content Version: 12.2  © 0855-9764 [employer]. Care instructions adapted under license by [employer] (which disclaims liability or warranty for this information).  If you have questions about a medical condition or this instruction, always ask your healthcare professional. [address] any warranty or liability for your use of this information.

## 2020-01-14 NOTE — ED NOTES
Poison control recommends sips of water to help dilute bleach. PO fluid trial to see if sx's of nausea resolve. No further diagnostics recommended at this time.

## 2020-01-14 NOTE — ED PROVIDER NOTES
EMERGENCY DEPARTMENT HISTORY AND PHYSICAL EXAM     ------------------------------------------------------------------------------------------------------  Please note that this dictation was completed with [employer], the [employer] voice recognition software. Quite often unanticipated grammatical, syntax, homophones, and other interpretive errors are inadvertently transcribed by the computer software. Please disregard these errors. Please excuse any errors that have escaped final proofreading.  -----------------------------------------------------------------------------------------------------------------    Date: 1/14/2020  Patient Name: Lisy Kelly    History of Presenting Illness     Chief Complaint   Patient presents with    Foreign Body Swallowed     pt ambulatory to triage with c/o accidentally swallowing about a tbsp of bleach that he thought was water after doing dishes this evening; has not contacted poison control, only symptom is nausea       History Provided By: Patient    HPI: Lisy Kelly is a 68 y.o. male, with significant pmhx of gerd, HTN who presents ambulatory to the ED with c/o nausea. 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 Patient notes that he drank some of the water and noticed an odd taste and then later realized that he had been drinking from a cup that previously held the bleach. Patient notes that there was maybe a teaspoon of bleach that head been residual in the cup after he had used it earlier. Patient contacted us for member who is a nurse who recommended he drink some water as well as milk. Patient notes that he initially felt improved but then started having some nausea and GI upset. Patient became concerned and presented to the emergency department for further evaluation. Notes feeling much better at time of evaluation.   No report of actual vomiting or diarrhea. Patient has been able to eat and drink without difficulty and denies associated pain in his throat. No evidence of drooling or muffled voice. Pt specifically denies any recent fevers, chills, vomiting, diarrhea, abd pain, CP, SOB, urinary sxs, changes in BM, or headache. PCP: Dario Al MD    Social Hx: denies tobacco, denies EtOH, denies Illicit Drugs     There are no other complaints, changes, or physical findings at this time. No Known Allergies      Current Facility-Administered Medications   Medication Dose Route Frequency Provider Last Rate Last Dose    ondansetron (ZOFRAN ODT) tablet 4 mg  4 mg Oral NOW Aimee Castillo MD         Current Outpatient Medications   Medication Sig Dispense Refill    ondansetron (ZOFRAN ODT) 4 mg disintegrating tablet Take 1 Tab by mouth every eight (8) hours as needed for Nausea. 10 Tab 0    lisinopril (PRINIVIL, ZESTRIL) 20 mg tablet TAKE 1 AND ONE-HALF TABLET BY MOUTH DAILY 135 Tab 1    latanoprost (XALATAN) 0.005 % ophthalmic solution Administer 1 Drop to both eyes nightly.  methocarbamol (ROBAXIN) 500 mg tablet Take 1 Tab by mouth four (4) times daily. 20 Tab 1    metoprolol succinate (TOPROL-XL) 50 mg XL tablet Take 1 Tab by mouth daily. 90 Tab 1    Omeprazole delayed release (PRILOSEC D/R) 20 mg tablet Take 1 Tab by mouth daily.  20 Tab 1    amLODIPine (NORVASC) 10 mg tablet TAKE 1 TABLET BY MOUTH DAILY 90 Tab 11       Past History     Past Medical History:  Past Medical History:   Diagnosis Date    Arthritis     DJD (degenerative joint disease) of hip     Hypertension        Past Surgical History:  Past Surgical History:   Procedure Laterality Date    HX ORTHOPAEDIC  2004    Total R hip replacement       Family History:  Family History   Problem Relation Age of Onset    Hypertension Mother     Coronary Artery Disease Maternal Grandmother     Coronary Artery Disease Maternal Grandfather     Hypertension Sister Social History:  Social History     Tobacco Use    Smoking status: Former Smoker     Packs/day: 1.00     Years: 20.00     Pack years: 20.00     Last attempt to quit: 10/1/2017     Years since quittin.2    Smokeless tobacco: Never Used    Tobacco comment: doesn't inhale   Substance Use Topics    Alcohol use: Yes     Comment: occasionally cachorro    Drug use: No       Allergies:  No Known Allergies      Review of Systems   Review of Systems   Constitutional: Negative for chills and fever. HENT: Negative. Eyes: Negative. Respiratory: Negative for cough, chest tightness and shortness of breath. Cardiovascular: Negative for chest pain and leg swelling. Gastrointestinal: Positive for diarrhea and nausea. Negative for abdominal pain and vomiting. Endocrine: Negative. Genitourinary: Negative for difficulty urinating and dysuria. Musculoskeletal: Negative for myalgias. Skin: Negative. Neurological: Negative. Psychiatric/Behavioral: Negative. All other systems reviewed and are negative. Physical Exam   Physical Exam  Vitals signs and nursing note reviewed. Constitutional:       General: He is not in acute distress. Appearance: He is well-developed. He is not diaphoretic. HENT:      Head: Normocephalic and atraumatic. Nose: Nose normal.      Mouth/Throat:      Tongue: No lesions. Pharynx: Oropharynx is clear. Uvula midline. No pharyngeal swelling, oropharyngeal exudate, posterior oropharyngeal erythema or uvula swelling. Tonsils: No tonsillar exudate or tonsillar abscesses. Comments: Patient with normal phonation  Eyes:      Conjunctiva/sclera: Conjunctivae normal.      Pupils: Pupils are equal, round, and reactive to light. Neck:      Musculoskeletal: Normal range of motion and neck supple. Vascular: No JVD. Cardiovascular:      Rate and Rhythm: Normal rate and regular rhythm. Heart sounds: Normal heart sounds. No murmur.  No friction rub.   Pulmonary:      Effort: Pulmonary effort is normal. No respiratory distress. Breath sounds: Normal breath sounds. No stridor. No wheezing or rales. Abdominal:      General: Bowel sounds are normal. There is no distension. Palpations: Abdomen is soft. Tenderness: There is no tenderness. There is no rebound. Musculoskeletal: Normal range of motion. General: No tenderness. Skin:     General: Skin is warm and dry. Findings: No rash. Neurological:      Mental Status: He is alert and oriented to person, place, and time. Cranial Nerves: No cranial nerve deficit. Psychiatric:         Speech: Speech normal.         Behavior: Behavior normal.         Thought Content: Thought content normal.         Judgment: Judgment normal.           Diagnostic Study Results     Labs -   No results found for this or any previous visit (from the past 12 hour(s)). Radiologic Studies -   No orders to display     CT Results  (Last 48 hours)    None        CXR Results  (Last 48 hours)    None            Medical Decision Making   I am the first provider for this patient. I reviewed the vital signs, available nursing notes, past medical history, past surgical history, family history and social history. Vital Signs-Reviewed the patient's vital signs. Patient Vitals for the past 12 hrs:   Temp Pulse Resp BP SpO2   01/14/20 0043 97.5 °F (36.4 °C) 78 16 162/71 100 %       Pulse Oximetry Analysis - 100% on RA    Records Reviewed: Nursing Notes    Provider Notes (Medical Decision Making):     DDX:  Accidental toxic ingestion    Plan:  Zofran, poison center cosult    Impression:  Nausea, toxic substance    ED Course:   Initial assessment performed. The patients presenting problems have been discussed, and they are in agreement with the care plan formulated and outlined with them. I have encouraged them to ask questions as they arise throughout their visit.     I reviewed our electronic medical record system for any past medical records that were available that may contribute to the patients current condition, the nursing notes and and vital signs from today's visit  Nursing notes will be reviewed as they become available in realtime while the pt has been in the ED. Miley Sow MD      2:46 AM  Progress note:  Poison center was contacted by triage nurse who indicated no further evaluation or treatment needed for this patient. Recommended supportive care for nausea as needed. Pt continues to be feeling better, ready for discharge. Pt will follow up with pcp as instructed. All questions have been answered, pt voiced understanding and agreement with plan. Specific return precautions provided in addition to instructions for pt to return to the ED immediately should sx worsen at any time. Miley Sow MD             Critical Care Time:     none      Diagnosis     Clinical Impression:   1. Accidental ingestion of toxic substance, initial encounter        PLAN:  1. Current Discharge Medication List      START taking these medications    Details   ondansetron (ZOFRAN ODT) 4 mg disintegrating tablet Take 1 Tab by mouth every eight (8) hours as needed for Nausea. Qty: 10 Tab, Refills: 0           2. Follow-up Information     Follow up With Specialties Details Why Contact Info    Abhijeet Vazquez MD [facility] Schedule an appointment as soon as possible for a visit  [address]  [address]  [phone]          Return to ED if worse     Disposition:    2:46 AM   The patient verbally convey their understanding and agreement of the patient's signs, symptoms, diagnosis, treatment and prognosis and additionally agree to follow up as recommended in the discharge instructions or to return to the Emergency Room should the patient's condition change prior to their follow-up appointment.  The family and/or caregiver verbally agrees with the care-plan and all of their questions have been answered. The discharge instructions have also been provided to the them with educational information regarding the patient's diagnosis as well a list of reasons why the patient would want to return to the ER prior to their follow-up appointment should their condition change. Jodi Miller MD          This note will not be viewable in [address]

## 2020-03-24 DIAGNOSIS — I10 ESSENTIAL HYPERTENSION: ICD-10-CM

## 2020-03-25 RX ORDER — METOPROLOL SUCCINATE 50 MG/1
TABLET, EXTENDED RELEASE ORAL
Qty: 90 TAB | Refills: 1 | Status: SHIPPED | OUTPATIENT
Start: 2020-03-25 | End: 2020-12-28 | Stop reason: SDUPTHER

## 2020-04-02 ENCOUNTER — TELEPHONE (OUTPATIENT)
Dept: INTERNAL MEDICINE CLINIC | Age: 70
End: 2020-04-02

## 2020-04-02 NOTE — TELEPHONE ENCOUNTER
Patient states he needs a call back to discuss getting advice/instructions on how he can do a Virtual Visit on his appt for 4/20/20 with Dr. Gretchen Christensen. Please call to discuss.  Thank you

## 2020-04-02 NOTE — TELEPHONE ENCOUNTER
Called, spoke with Pt. Two pt identifiers confirmed. Explained the process of virtual visit to patient for upcoming office. Pt verbalized understanding of information discussed w/ no further questions at this time.

## 2020-04-20 ENCOUNTER — VIRTUAL VISIT (OUTPATIENT)
Dept: INTERNAL MEDICINE CLINIC | Age: 70
End: 2020-04-20

## 2020-04-20 DIAGNOSIS — K21.9 GASTROESOPHAGEAL REFLUX DISEASE WITHOUT ESOPHAGITIS: ICD-10-CM

## 2020-04-20 DIAGNOSIS — G89.29 CHRONIC BACK PAIN, UNSPECIFIED BACK LOCATION, UNSPECIFIED BACK PAIN LATERALITY: ICD-10-CM

## 2020-04-20 DIAGNOSIS — M54.9 CHRONIC BACK PAIN, UNSPECIFIED BACK LOCATION, UNSPECIFIED BACK PAIN LATERALITY: ICD-10-CM

## 2020-04-20 DIAGNOSIS — E78.2 MIXED HYPERLIPIDEMIA: ICD-10-CM

## 2020-04-20 DIAGNOSIS — I10 ESSENTIAL HYPERTENSION: Primary | ICD-10-CM

## 2020-04-20 RX ORDER — PHENOL/SODIUM PHENOLATE
20 AEROSOL, SPRAY (ML) MUCOUS MEMBRANE DAILY
Qty: 30 TAB | Refills: 3 | Status: SHIPPED | OUTPATIENT
Start: 2020-04-20 | End: 2022-06-29 | Stop reason: ALTCHOICE

## 2020-04-20 RX ORDER — METHOCARBAMOL 500 MG/1
500 TABLET, FILM COATED ORAL 3 TIMES DAILY
Qty: 30 TAB | Refills: 1 | Status: SHIPPED | OUTPATIENT
Start: 2020-04-20 | End: 2021-05-11

## 2020-04-20 NOTE — PROGRESS NOTES
Jimbo Can is a 65 y.o. male evaluated via telephone on 4/20/2020. Consent:  He and/or health care decision maker is aware that that he may receive a bill for this telephone service, depending on his insurance coverage, and has provided verbal consent to proceed: Yes    Documentation:  I communicated with the patient and/or health care decision maker about follow up for multiple chronic health problems. Details of this discussion including any medical advice provided:     1. Gastroesophageal reflux disease without esophagitis  - Omeprazole delayed release (PRILOSEC D/R) 20 mg tablet; Take 1 Tab by mouth daily. Dispense: 30 Tab; Refill: 3    2. Chronic back pain, unspecified back location, unspecified back pain laterality    - methocarbamoL (Robaxin) 500 mg tablet; Take 1 Tab by mouth three (3) times daily. Dispense: 30 Tab; Refill: 1    3. Essential hypertension    - METABOLIC PANEL, COMPREHENSIVE  - CBC WITH AUTOMATED DIFF    4. Mixed hyperlipidemia    - LIPID PANEL    Patient is here for a virtual phone visit. He reports having some heartburn symptoms needs a refill of his Prilosec. We reviewed foods and beverages he should avoid to decrease flareups of reflux. He has chronic back pain which is tolerated well, but he is requesting Robaxin today. Patient is not checking his blood pressures. He was advised to check his blood pressures taking his medication. Lab orders today include a CMP and CBC. We will do these in 2 months. He has hyperlipidemia and lipid panel was ordered to better evaluate his current lipid levels. I affirm this is a Patient Initiated Episode with an Established Patient who has not had a related appointment within my department in the past 7 days or scheduled within the next 24 hours.     Total Time: minutes: 11-20 minutes    Note: not billable if this call serves to triage the patient into an appointment for the relevant concern      Orville Guerrero MD

## 2020-04-20 NOTE — PROGRESS NOTES
Identified pt with two pt identifiers(name and ). Reviewed record in preparation for visit and have obtained necessary documentation. Chief Complaint   Patient presents with    Hypertension     6 month f/u     Heartburn        There were no vitals taken for this visit. Health Maintenance Due   Topic    DTaP/Tdap/Td series (1 - Tdap)    Shingrix Vaccine Age 49> (1 of 2)    FOBT Q1Y Age 54-65     Medicare Yearly Exam        Med Reconciliation: Completed    Coordination of Care Questionnaire:  :   1) Have you been to an emergency room, urgent care, or hospitalized since your last visit? If yes, where when, and reason for visit? No       2. Have seen or consulted any other health care provider since your last visit? If yes, where when, and reason for visit? No       3) Do you have an Advanced Directive/ Living Will in place? No  If yes, do we have a copy on file   If no, would you like information       This is an established visit conducted via telemedicine. The patient has been instructed that this meets HIPAA criteria and acknowledges and agrees to this method of visitation.     Venice Burgess  20  11:35 AM

## 2020-06-23 RX ORDER — LISINOPRIL 20 MG/1
TABLET ORAL
Qty: 135 TAB | Refills: 1 | Status: SHIPPED | OUTPATIENT
Start: 2020-06-23 | End: 2020-12-20

## 2020-12-20 RX ORDER — LISINOPRIL 20 MG/1
TABLET ORAL
Qty: 135 TAB | Refills: 1 | Status: SHIPPED | OUTPATIENT
Start: 2020-12-20 | End: 2021-06-20

## 2020-12-28 DIAGNOSIS — I10 ESSENTIAL HYPERTENSION: ICD-10-CM

## 2020-12-28 NOTE — TELEPHONE ENCOUNTER
PCP: Armaan Bianchi MD    Last appt: Visit date not found  Future Appointments   Date Time Provider Lala Pitts   1/4/2021  3:45 PM Armaan Bianchi MD [facility]       Requested Prescriptions     Pending Prescriptions Disp Refills    metoprolol succinate (TOPROL-XL) 50 mg XL tablet 90 Tab 1     Sig: TAKE 1 TABLET BY MOUTH DAILY

## 2020-12-28 NOTE — TELEPHONE ENCOUNTER
----- Message from [place] sent at 12/28/2020 12:41 PM EST -----  Regarding: Dr. Bridger Horner, refill  Contact: [phone]  Medication Refill    Caller (if not patient):      Relationship of caller (if not patient): n/a      Best contact number(s):  [phone]      Name of medication and dosage if known: metoprolol succinate 50 mg tabs      Is patient out of this medication (yes/no): Only has 3 left      Pharmacy name: [facility] on [address] listed in chart? (yes/no):  Yes  Pharmacy phone number:  [phone]      Details to clarify the request: Pt scheduled for VV on 01/04 at 3:45 p.m. He is asking for a refill because he'll be out prior to his appt.         Message from [facility]

## 2020-12-29 RX ORDER — METOPROLOL SUCCINATE 50 MG/1
TABLET, EXTENDED RELEASE ORAL
Qty: 90 TAB | Refills: 1 | Status: SHIPPED | OUTPATIENT
Start: 2020-12-29 | End: 2021-06-17

## 2021-01-04 ENCOUNTER — VIRTUAL VISIT (OUTPATIENT)
Dept: INTERNAL MEDICINE CLINIC | Age: 71
End: 2021-01-04
Payer: MEDICARE

## 2021-01-04 DIAGNOSIS — I10 ESSENTIAL HYPERTENSION: ICD-10-CM

## 2021-01-04 DIAGNOSIS — Z12.5 SCREENING PSA (PROSTATE SPECIFIC ANTIGEN): Primary | ICD-10-CM

## 2021-01-04 DIAGNOSIS — R22.1 NECK SWELLING: ICD-10-CM

## 2021-01-04 PROCEDURE — 99212 OFFICE O/P EST SF 10 MIN: CPT | Performed by: FAMILY MEDICINE

## 2021-01-04 RX ORDER — CYCLOBENZAPRINE HCL 10 MG
10 TABLET ORAL
COMMUNITY
Start: 2020-06-12 | End: 2021-05-11

## 2021-01-04 NOTE — PROGRESS NOTES
Pepe Vargas is a 65 y.o. male who was seen by synchronous (real-time) audio-video technology on 2021 for Hypertension (6 month follow up ) and Neck Swelling (left side swelling x1 year )        Assessment & Plan:   Diagnoses and all orders for this visit:    1. Screening PSA (prostate specific antigen)  -     PSA, DIAGNOSTIC (PROSTATE SPECIFIC AG)    2. Essential hypertension  -     METABOLIC PANEL, COMPREHENSIVE  -     CBC WITH AUTOMATED DIFF    3. Neck swelling    1. Screening PSA  I ordered a PSA DIAGNOSTIC test.    2. Essential Hypertension  BP seems to be well controlled. I recommended continuing current dose of  Toprol-XL 50 mg XL tablet every day, lisinopril 20 mg tablet (1.5 tab) every day, amlodipine 10 mg tablet every day, eating a low sodium diet, and increasing exercise. Recheck CMP and CBC. 3. Neck Swelling  I advised the pt to monitor the swelling and to have it evaluated if it persists or increases in size. I spent at least 10 minutes on this visit with this established patient. Subjective:     Patient present virtually today for a routine f/u and medication evaluation. Pt had blood work scheduled for 2020 and has since . HTN: Pt reports his BP being stable. He is compliant in taking Toprol-XL 50 mg XL tablet every day, lisinopril 20 mg tablet (1.5 tab) every day, amlodipine 10 mg tablet every day. Patient denies chest pain, LOWE/SOB, edema, headache, visual changes, dizziness, palpitations or syncope. Neck Swelling: Pt reports a firm swelling to the lateral left side of his neck that is not tender. Swelling comes and goes per the patient. PREVENTIVE:  Colonoscopy: Referred to Dr. Narciso Ibrahim (Gastroenterology), pt will schedule  PSA: Scheduled for future  Flu: 2020    Pt plans to get COVID-19 vaccine when available. Prior to Admission medications    Medication Sig Start Date End Date Taking?  Authorizing Provider   cyclobenzaprine (FLEXERIL) 10 mg tablet Take 10 mg by mouth three (3) times daily as needed. 6/12/20  Yes Provider, Historical   metoprolol succinate (TOPROL-XL) 50 mg XL tablet TAKE 1 TABLET BY MOUTH DAILY 12/29/20  Yes Maday Yates MD   lisinopriL (PRINIVIL, ZESTRIL) 20 mg tablet TAKE 1 AND 1/2 TABLETS BY MOUTH DAILY 12/20/20  Yes Gonzalo Barrera MD   amLODIPine (NORVASC) 10 mg tablet TAKE 1 TABLET BY MOUTH EVERY DAY 10/7/20  Yes Gonzalo Barrera MD   Omeprazole delayed release (PRILOSEC D/R) 20 mg tablet Take 1 Tab by mouth daily. Patient taking differently: Take 20 mg by mouth daily as needed. 4/20/20  Yes Gonzalo Barrera MD   methocarbamoL (Robaxin) 500 mg tablet Take 1 Tab by mouth three (3) times daily. 4/20/20  Yes Gonzalo Barrera MD   ondansetron (ZOFRAN ODT) 4 mg disintegrating tablet Take 1 Tab by mouth every eight (8) hours as needed for Nausea. 1/14/20  Yes Jumana Sanders MD   latanoprost (XALATAN) 0.005 % ophthalmic solution Administer 1 Drop to both eyes nightly. Yes Provider, Historical     Patient Active Problem List    Diagnosis Date Noted    Ejection fraction < 50% 01/28/2015    HTN (hypertension) 01/28/2015    DJD (degenerative joint disease) of hip 06/02/2014    Abnormal EKG 05/01/2014     Current Outpatient Medications   Medication Sig Dispense Refill    cyclobenzaprine (FLEXERIL) 10 mg tablet Take 10 mg by mouth three (3) times daily as needed.  metoprolol succinate (TOPROL-XL) 50 mg XL tablet TAKE 1 TABLET BY MOUTH DAILY 90 Tab 1    lisinopriL (PRINIVIL, ZESTRIL) 20 mg tablet TAKE 1 AND 1/2 TABLETS BY MOUTH DAILY 135 Tab 1    amLODIPine (NORVASC) 10 mg tablet TAKE 1 TABLET BY MOUTH EVERY DAY 90 Tab 2    Omeprazole delayed release (PRILOSEC D/R) 20 mg tablet Take 1 Tab by mouth daily. (Patient taking differently: Take 20 mg by mouth daily as needed.) 30 Tab 3    methocarbamoL (Robaxin) 500 mg tablet Take 1 Tab by mouth three (3) times daily.  30 Tab 1    ondansetron (ZOFRAN ODT) 4 mg disintegrating tablet Take 1 Tab by mouth every eight (8) hours as needed for Nausea. 10 Tab 0    latanoprost (XALATAN) 0.005 % ophthalmic solution Administer 1 Drop to both eyes nightly. No Known Allergies  Past Medical History:   Diagnosis Date    Arthritis     DJD (degenerative joint disease) of hip     Hypertension      Past Surgical History:   Procedure Laterality Date    HX ORTHOPAEDIC  2004    Total R hip replacement     Family History   Problem Relation Age of Onset    Hypertension Mother     Coronary Artery Disease Maternal Grandmother     Coronary Artery Disease Maternal Grandfather     Hypertension Sister      Social History     Tobacco Use    Smoking status: Former Smoker     Packs/day: 1.00     Years: 20.00     Pack years: 20.00     Quit date: 10/1/2017     Years since quitting: 3.2    Smokeless tobacco: Never Used    Tobacco comment: doesn't inhale   Substance Use Topics    Alcohol use: Yes     Comment: occasionally cachorro       Review of Systems   Constitutional: Negative. HENT:        Swelling on the side of his neck that comes and goes. No tenderness or erythema   Respiratory: Negative for shortness of breath. Cardiovascular: Negative for chest pain. Gastrointestinal: Negative. Genitourinary: Negative. Musculoskeletal: Negative. Skin: Negative. Neurological: Negative. Psychiatric/Behavioral: Negative. Objective:   No flowsheet data found.      [INSTRUCTIONS:  \"[x]\" Indicates a positive item  \"[]\" Indicates a negative item  -- DELETE ALL ITEMS NOT EXAMINED]    Constitutional: [x] Appears well-developed and well-nourished [x] No apparent distress      [] Abnormal -     Mental status: [x] Alert and awake  [x] Oriented to person/place/time [x] Able to follow commands    [] Abnormal -     Eyes:   EOM    [x]  Normal    [] Abnormal -   Sclera  [x]  Normal    [] Abnormal -          Discharge [x]  None visible   [] Abnormal -     HENT: [x] Normocephalic, atraumatic  [] Abnormal -   [x] Mouth/Throat: Mucous membranes are moist    External Ears [x] Normal  [] Abnormal -    Neck: [x] No visualized mass [] Abnormal -     Pulmonary/Chest: [x] Respiratory effort normal   [x] No visualized signs of difficulty breathing or respiratory distress        [] Abnormal -      Musculoskeletal:   [x] Normal gait with no signs of ataxia         [x] Normal range of motion of neck        [] Abnormal -     Neurological:        [x] No Facial Asymmetry (Cranial nerve 7 motor function) (limited exam due to video visit)          [x] No gaze palsy        [] Abnormal -          Skin:        [x] No significant exanthematous lesions or discoloration noted on facial skin         [] Abnormal -            Psychiatric:       [x] Normal Affect [] Abnormal -        [x] No Hallucinations    Other pertinent observable physical exam findings:-        We discussed the expected course, resolution and complications of the diagnosis(es) in detail. Medication risks, benefits, costs, interactions, and alternatives were discussed as indicated. I advised him to contact the office if his condition worsens, changes or fails to improve as anticipated. He expressed understanding with the diagnosis(es) and plan. Suraj Akins, who was evaluated through a patient-initiated, synchronous (real-time) audio-video encounter, and/or his healthcare decision maker, is aware that it is a billable service, with coverage as determined by his insurance carrier. He provided verbal consent to proceed: Yes, and patient identification was verified. It was conducted pursuant to the emergency declaration under the [address] [place] [address], [address] [place] [address] authority and the [employer] and [employer] General Act. A caregiver was present when appropriate. Ability to conduct physical exam was limited. I was at home. The patient was at home.       Eduardo Recinos, as dictated by Jessica Moralez MD.

## 2021-01-28 LAB
ALBUMIN SERPL-MCNC: 4.4 G/DL (ref 3.7–4.7)
ALBUMIN/GLOB SERPL: 1.7 {RATIO} (ref 1.2–2.2)
ALP SERPL-CCNC: 95 IU/L (ref 39–117)
ALT SERPL-CCNC: 25 IU/L (ref 0–44)
AST SERPL-CCNC: 22 IU/L (ref 0–40)
BASOPHILS # BLD AUTO: 0.1 X10E3/UL (ref 0–0.2)
BASOPHILS NFR BLD AUTO: 1 %
BILIRUB SERPL-MCNC: 0.4 MG/DL (ref 0–1.2)
BUN SERPL-MCNC: 15 MG/DL (ref 8–27)
BUN/CREAT SERPL: 14 (ref 10–24)
CALCIUM SERPL-MCNC: 9.6 MG/DL (ref 8.6–10.2)
CHLORIDE SERPL-SCNC: 105 MMOL/L (ref 96–106)
CO2 SERPL-SCNC: 23 MMOL/L (ref 20–29)
CREAT SERPL-MCNC: 1.08 MG/DL (ref 0.76–1.27)
EOSINOPHIL # BLD AUTO: 0.3 X10E3/UL (ref 0–0.4)
EOSINOPHIL NFR BLD AUTO: 4 %
ERYTHROCYTE [DISTWIDTH] IN BLOOD BY AUTOMATED COUNT: 11.7 % (ref 11.6–15.4)
GLOBULIN SER CALC-MCNC: 2.6 G/DL (ref 1.5–4.5)
GLUCOSE SERPL-MCNC: 122 MG/DL (ref 65–99)
HCT VFR BLD AUTO: 40.9 % (ref 37.5–51)
HGB BLD-MCNC: 13.2 G/DL (ref 13–17.7)
IMM GRANULOCYTES # BLD AUTO: 0 X10E3/UL (ref 0–0.1)
IMM GRANULOCYTES NFR BLD AUTO: 0 %
LYMPHOCYTES # BLD AUTO: 2.7 X10E3/UL (ref 0.7–3.1)
LYMPHOCYTES NFR BLD AUTO: 41 %
MCH RBC QN AUTO: 31 PG (ref 26.6–33)
MCHC RBC AUTO-ENTMCNC: 32.3 G/DL (ref 31.5–35.7)
MCV RBC AUTO: 96 FL (ref 79–97)
MONOCYTES # BLD AUTO: 0.7 X10E3/UL (ref 0.1–0.9)
MONOCYTES NFR BLD AUTO: 10 %
NEUTROPHILS # BLD AUTO: 2.8 X10E3/UL (ref 1.4–7)
NEUTROPHILS NFR BLD AUTO: 44 %
PLATELET # BLD AUTO: 262 X10E3/UL (ref 150–450)
POTASSIUM SERPL-SCNC: 4.5 MMOL/L (ref 3.5–5.2)
PROT SERPL-MCNC: 7 G/DL (ref 6–8.5)
PSA SERPL-MCNC: 1.6 NG/ML (ref 0–4)
RBC # BLD AUTO: 4.26 X10E6/UL (ref 4.14–5.8)
SODIUM SERPL-SCNC: 142 MMOL/L (ref 134–144)
WBC # BLD AUTO: 6.4 X10E3/UL (ref 3.4–10.8)

## 2021-01-29 NOTE — PROGRESS NOTES
CMP:Normal electrolyte levels except for an elevation in the glucose level, normal renal and liver function  CBC: Normal  PSA: Normal

## 2021-03-24 ENCOUNTER — TELEPHONE (OUTPATIENT)
Dept: CARDIOLOGY CLINIC | Age: 71
End: 2021-03-24

## 2021-04-09 NOTE — TELEPHONE ENCOUNTER
Patient calls for results of xrays and labs.  Patient states her hands are very painful and she would like to know if the Xrays show the cause.       Pt called in,verified pt with two pt identifiers, pt advised he has been having chest pain on lf breast bone with some lf arm tightness and hand tingling. When he takes a deep breathe it hurts his chest. This has been going on for 2-3 days on and off. He is not having any other cardiac symptoms. No med changes. No nitro to use. He went to [facility] today to get checked out and they did EKG and it was normal per the dr. His BP on arrival 185/75 and before he left 140 /75. No lab work. The dr advised him to f/u with us. Asked pt if he had been pushing or pulling 3 days ago that would cause some muscular pain like this. He does not recall any, except walking his dog and exercising. Verified his last appt with  on 10/11/2019. Verified appt on 5/11/21 with . advised pt he is going to need to be seen for evaluation. Advised I would send this to dr/np for FYI. Advised pt in the mean time I would recommend if his symptoms persist or get more severe or he starts to have any other symptoms with the chest pain then he needs to go to ER. Pt verbalized understanding.

## 2021-05-11 ENCOUNTER — OFFICE VISIT (OUTPATIENT)
Dept: CARDIOLOGY CLINIC | Age: 71
End: 2021-05-11
Payer: MEDICARE

## 2021-05-11 VITALS
SYSTOLIC BLOOD PRESSURE: 142 MMHG | RESPIRATION RATE: 18 BRPM | BODY MASS INDEX: 27.52 KG/M2 | WEIGHT: 196.6 LBS | HEART RATE: 64 BPM | HEIGHT: 71 IN | OXYGEN SATURATION: 98 % | DIASTOLIC BLOOD PRESSURE: 68 MMHG

## 2021-05-11 DIAGNOSIS — I10 ESSENTIAL HYPERTENSION: ICD-10-CM

## 2021-05-11 DIAGNOSIS — I50.22 SYSTOLIC CHF, CHRONIC (HCC): Primary | ICD-10-CM

## 2021-05-11 DIAGNOSIS — E78.2 MIXED HYPERLIPIDEMIA: ICD-10-CM

## 2021-05-11 PROCEDURE — G8419 CALC BMI OUT NRM PARAM NOF/U: HCPCS | Performed by: INTERNAL MEDICINE

## 2021-05-11 PROCEDURE — G8536 NO DOC ELDER MAL SCRN: HCPCS | Performed by: INTERNAL MEDICINE

## 2021-05-11 PROCEDURE — G8754 DIAS BP LESS 90: HCPCS | Performed by: INTERNAL MEDICINE

## 2021-05-11 PROCEDURE — 93000 ELECTROCARDIOGRAM COMPLETE: CPT | Performed by: INTERNAL MEDICINE

## 2021-05-11 PROCEDURE — G8427 DOCREV CUR MEDS BY ELIG CLIN: HCPCS | Performed by: INTERNAL MEDICINE

## 2021-05-11 PROCEDURE — G8753 SYS BP > OR = 140: HCPCS | Performed by: INTERNAL MEDICINE

## 2021-05-11 PROCEDURE — G8510 SCR DEP NEG, NO PLAN REQD: HCPCS | Performed by: INTERNAL MEDICINE

## 2021-05-11 PROCEDURE — 1101F PT FALLS ASSESS-DOCD LE1/YR: CPT | Performed by: INTERNAL MEDICINE

## 2021-05-11 PROCEDURE — 99213 OFFICE O/P EST LOW 20 MIN: CPT | Performed by: INTERNAL MEDICINE

## 2021-05-11 PROCEDURE — 3017F COLORECTAL CA SCREEN DOC REV: CPT | Performed by: INTERNAL MEDICINE

## 2021-05-11 NOTE — LETTER
5/11/2021 Patient: Ayana Corral YOB: 1950 Date of Visit: 5/11/2021 Annamarie Reid MD 
[address] [address] [address] [employer] Dear Annamarie Reid MD, Thank you for referring Mr. Lona Roberts to [address] [place] [address] for evaluation. My notes for this consultation are attached. If you have questions, please do not hesitate to call me. I look forward to following your patient along with you. Sincerely, Phoebe Johnson MD

## 2021-05-11 NOTE — PROGRESS NOTES
1. Have you been to the ER, urgent care clinic since your last visit? Hospitalized since your last visit? Yes, 1/14/20, ER, Foreign Body Swallowed     2. Have you seen or consulted any other health care providers outside of the [address] [place] [address] since your last visit? Include any pap smears or colon screening. No         Chief Complaint   Patient presents with    CHF     C/O CP, Headache     Pt has received both doses of Covid vaccine.

## 2021-05-11 NOTE — PROGRESS NOTES
Mendoza Chawla, FNP-BC    Subjective/HPI:     Haja Avilez is a 66 y.o. male is here for routine f/u. He has a PMHx of NICM, HTN, HLD and GERD. .    Doing well. Denies complaints of chest pains, dizziness, orthopnea, PND or edema. Denies shortness of breath or palpitations. Exercises regularly, walking every day and does 10 lb weights. Has a dog that he enjoys walking. Used to work for [employer] as a , and so enjoys free air travel through them as a nursing home perk. Admits he has been a little lax with diet -- eating more ice cream lately. Current Outpatient Medications on File Prior to Visit   Medication Sig Dispense Refill    metoprolol succinate (TOPROL-XL) 50 mg XL tablet TAKE 1 TABLET BY MOUTH DAILY 90 Tab 1    lisinopriL (PRINIVIL, ZESTRIL) 20 mg tablet TAKE 1 AND 1/2 TABLETS BY MOUTH DAILY 135 Tab 1    amLODIPine (NORVASC) 10 mg tablet TAKE 1 TABLET BY MOUTH EVERY DAY 90 Tab 2    Omeprazole delayed release (PRILOSEC D/R) 20 mg tablet Take 1 Tab by mouth daily. (Patient taking differently: Take 20 mg by mouth daily as needed.) 30 Tab 3    latanoprost (XALATAN) 0.005 % ophthalmic solution Administer 1 Drop to both eyes nightly.  cyclobenzaprine (FLEXERIL) 10 mg tablet Take 10 mg by mouth three (3) times daily as needed.  methocarbamoL (Robaxin) 500 mg tablet Take 1 Tab by mouth three (3) times daily. 30 Tab 1    ondansetron (ZOFRAN ODT) 4 mg disintegrating tablet Take 1 Tab by mouth every eight (8) hours as needed for Nausea. 10 Tab 0     No current facility-administered medications on file prior to visit. Review of Symptoms:    Review of Systems   Constitutional: Negative for chills, fever and weight loss. HENT: Negative for nosebleeds. Eyes: Negative for blurred vision and double vision. Respiratory: Negative for cough, shortness of breath and wheezing.     Cardiovascular: Negative for chest pain, palpitations, orthopnea, leg swelling and PND. Gastrointestinal: Negative for abdominal pain, blood in stool, diarrhea, nausea and vomiting. Musculoskeletal: Negative for joint pain. Skin: Negative for rash. Neurological: Negative for dizziness, tingling and loss of consciousness. Endo/Heme/Allergies: Does not bruise/bleed easily. Physical Exam:      General: Well developed, in no acute distress, cooperative and alert  Heart:  reg rate and rhythm; normal S1/S2; no murmurs, no gallops or rubs. Respiratory: Clear bilaterally x 4, no wheezing or rales  Extremities:  Normal cap refill, no cyanosis, atraumatic. No edema. Vascular: 2+ pulses symmetric in all extremities    Vitals:    05/11/21 0947 05/11/21 1006   BP: (!) 146/70 (!) 142/68   Pulse: 64    Resp: 18    SpO2: 98%    Weight: 196 lb 9.6 oz (89.2 kg)    Height: 5' 11\" (1.803 m)        ECG done today shows sinus rhythm     Assessment:       ICD-10-CM ICD-9-CM    1. Systolic CHF, chronic (HCC)  I50.22 428.22 AMB POC EKG ROUTINE W/ 12 LEADS, INTER & REP     428.0    2. Essential hypertension  I10 401.9    3. Mixed hyperlipidemia  E78.2 272.2         Plan:     1. Systolic CHF, chronic (HCC)  Hx of non-ischemic cardiomyopathy, now resolved  Cath in 5/2014 with normal coronaries angiopgrahically  Echo done 8/2017 with preserved LVEF 55-60% with mild PHTN  Euvolemic on exam today; NYHA FC 1B  Continue Toprol, lisinopril, amlodipine    2. Essential hypertension  BP sub-optimal.  Advised to monitor BP at home and call for consistent readings > 145/85. 3. Mixed hyperlipidemia   in 9/2019  Lipids by PCP; encouraged low fat, low cholesterol diet    F/u with Dr. Sara Hogan in 1 year    Gen Pablo NP      [facility]    5/11/2021         Patient seen, examined by me personally. Plan discussed as detailed. Agree with note as outlined by  NP with modifications as noted.  My independent physical exam reveals : Physical Exam   Constitutional: He is oriented to person, place, and time. He appears well-developed and well-nourished. HENT:   Head: Normocephalic. Neck: Neck supple. Cardiovascular: Normal rate and regular rhythm. No murmur heard. Pulmonary/Chest: Effort normal. He has no rales. Musculoskeletal:         General: No edema. Neurological: He is alert and oriented to person, place, and time. Skin: Skin is warm and dry. Psychiatric: He has a normal mood and affect. Nursing note and vitals reviewed. No additional findings noted. Agree with plan as outlined above with modifications as noted.      [id number] Chris Suarez MD

## 2021-06-17 DIAGNOSIS — I10 ESSENTIAL HYPERTENSION: ICD-10-CM

## 2021-06-17 RX ORDER — METOPROLOL SUCCINATE 50 MG/1
TABLET, EXTENDED RELEASE ORAL
Qty: 90 TABLET | Refills: 1 | Status: SHIPPED | OUTPATIENT
Start: 2021-06-17 | End: 2021-12-14

## 2021-06-20 RX ORDER — LISINOPRIL 20 MG/1
TABLET ORAL
Qty: 135 TABLET | Refills: 1 | Status: SHIPPED | OUTPATIENT
Start: 2021-06-20 | End: 2021-12-14

## 2021-06-20 RX ORDER — AMLODIPINE BESYLATE 10 MG/1
TABLET ORAL
Qty: 90 TABLET | Refills: 2 | Status: SHIPPED | OUTPATIENT
Start: 2021-06-20 | End: 2022-03-16

## 2021-12-14 DIAGNOSIS — I10 ESSENTIAL HYPERTENSION: ICD-10-CM

## 2021-12-14 RX ORDER — LISINOPRIL 20 MG/1
TABLET ORAL
Qty: 135 TABLET | Refills: 1 | Status: SHIPPED | OUTPATIENT
Start: 2021-12-14 | End: 2022-06-12

## 2021-12-14 RX ORDER — METOPROLOL SUCCINATE 50 MG/1
TABLET, EXTENDED RELEASE ORAL
Qty: 90 TABLET | Refills: 1 | Status: SHIPPED | OUTPATIENT
Start: 2021-12-14 | End: 2022-06-14 | Stop reason: SDUPTHER

## 2022-01-05 ENCOUNTER — VIRTUAL VISIT (OUTPATIENT)
Dept: INTERNAL MEDICINE CLINIC | Age: 72
End: 2022-01-05
Payer: MEDICARE

## 2022-01-05 DIAGNOSIS — M25.561 ACUTE PAIN OF RIGHT KNEE: ICD-10-CM

## 2022-01-05 DIAGNOSIS — M54.12 CERVICAL RADICULOPATHY: Primary | ICD-10-CM

## 2022-01-05 DIAGNOSIS — I50.22 SYSTOLIC CHF, CHRONIC (HCC): ICD-10-CM

## 2022-01-05 PROCEDURE — 3017F COLORECTAL CA SCREEN DOC REV: CPT | Performed by: FAMILY MEDICINE

## 2022-01-05 PROCEDURE — G8432 DEP SCR NOT DOC, RNG: HCPCS | Performed by: FAMILY MEDICINE

## 2022-01-05 PROCEDURE — 1101F PT FALLS ASSESS-DOCD LE1/YR: CPT | Performed by: FAMILY MEDICINE

## 2022-01-05 PROCEDURE — G8427 DOCREV CUR MEDS BY ELIG CLIN: HCPCS | Performed by: FAMILY MEDICINE

## 2022-01-05 PROCEDURE — 99214 OFFICE O/P EST MOD 30 MIN: CPT | Performed by: FAMILY MEDICINE

## 2022-01-05 PROCEDURE — G8756 NO BP MEASURE DOC: HCPCS | Performed by: FAMILY MEDICINE

## 2022-01-05 NOTE — PROGRESS NOTES
Brayden Giron is a 66 y.o. male who was seen by synchronous (real-time) audio-video technology on 1/5/2022 for Pain (Chronic) (fingers and left arm. left knee swelling. feels like pins in finger and arm. Worse in the morning and sometimes in the night while laying down. ) and Other (sharp pain in neck one night might be a pinched nerve. )        Assessment & Plan:   Diagnoses and all orders for this visit:    1. Cervical radiculopathy  -     REFERRAL TO ORTHOPEDICS    2. Systolic CHF, chronic ([address]    3. Acute pain of right knee  -     REFERRAL TO ORTHOPEDICS        Cervical radiculopathy: Left hand worse than right hand. I recommended Voltaren gel. I recommended a follow-up with Dr. Marguerite Mayorga (Orthopedics) and provided a referral.     Right knee pain: Right knee is swollen and feels like it has fluid in it. The pain can radiate from his knee down to his ankle. I recommend a follow-up with Dr. Bushra Lenz (Orthopedics) and provided a referral.    Systolic CHF: Followed by Dr. Milton Jones. Subjective:       Cervical Radiculopathy: The pt notes stinging and tingling in the fingers, like a burning in both hands. Mainly in the left hand, but it alternates and can radiate to the arm and neck. Some fingers end up cramping and will have to pull fingers apart. It worsens when he lays down at night. He can feel it when he's not awake, but its the worst when he just wakes up. Knee pain: Right knee is swollen and feels like it has fluid in it, which occurred after his hip surgery. When he walks, he experiences calf pain that feels like pulling a string. It aches from his knee down to his ankle. He feels a stinging sensation in his toes and experiences pain in the center of foot. He has a budging disc in the back, and has some back pain. He takes 2 Aleves if pain is bad enough, which helps with the pain.     Systolic CHF: Patient's CHF is stable and managed by his cardiologist.  He reports no abnormalities noted at his most recent cardiology appointment. Prior to Admission medications    Medication Sig Start Date End Date Taking? Authorizing Provider   lisinopriL (PRINIVIL, ZESTRIL) 20 mg tablet TAKE 1 AND 1/2 TABLETS BY MOUTH DAILY 12/14/21  Yes Elly Moreno MD   metoprolol succinate (TOPROL-XL) 50 mg XL tablet TAKE 1 TABLET BY MOUTH DAILY 12/14/21  Yes Caroline Hannah MD   amLODIPine (NORVASC) 10 mg tablet TAKE 1 TABLET BY MOUTH EVERY DAY 6/20/21  Yes Elly Moreno MD   Omeprazole delayed release (PRILOSEC D/R) 20 mg tablet Take 1 Tab by mouth daily. Patient taking differently: Take 20 mg by mouth daily as needed. 4/20/20  Yes Elly Moreno MD   latanoprost (XALATAN) 0.005 % ophthalmic solution Administer 1 Drop to both eyes nightly. Yes Provider, Historical     Patient Active Problem List    Diagnosis Date Noted    Systolic CHF, chronic ([facility] [address] 05/11/2021    Mixed hyperlipidemia 05/11/2021    Ejection fraction < 50% 01/28/2015    HTN (hypertension) 01/28/2015    DJD (degenerative joint disease) of hip 06/02/2014    Abnormal EKG 05/01/2014     Current Outpatient Medications   Medication Sig Dispense Refill    lisinopriL (PRINIVIL, ZESTRIL) 20 mg tablet TAKE 1 AND 1/2 TABLETS BY MOUTH DAILY 135 Tablet 1    metoprolol succinate (TOPROL-XL) 50 mg XL tablet TAKE 1 TABLET BY MOUTH DAILY 90 Tablet 1    amLODIPine (NORVASC) 10 mg tablet TAKE 1 TABLET BY MOUTH EVERY DAY 90 Tablet 2    Omeprazole delayed release (PRILOSEC D/R) 20 mg tablet Take 1 Tab by mouth daily. (Patient taking differently: Take 20 mg by mouth daily as needed.) 30 Tab 3    latanoprost (XALATAN) 0.005 % ophthalmic solution Administer 1 Drop to both eyes nightly.        No Known Allergies  Past Medical History:   Diagnosis Date    Arthritis     DJD (degenerative joint disease) of hip     Hypertension      Past Surgical History:   Procedure Laterality Date    HX ORTHOPAEDIC  2004    Total R hip replacement     Family History   Problem Relation Age of Onset    Hypertension Mother     Coronary Art Dis Maternal Grandmother     Coronary Art Dis Maternal Grandfather     Hypertension Sister      Social History     Tobacco Use    Smoking status: Former Smoker     Packs/day: 1.00     Years: 20.00     Pack years: 20.00     Quit date: 10/1/2017     Years since quittin.2    Smokeless tobacco: Never Used    Tobacco comment: doesn't inhale   Substance Use Topics    Alcohol use: Yes     Comment: occasionally cachorro       Review of Systems   Constitutional: Negative. HENT: Negative. Eyes: Negative. Respiratory: Negative. Cardiovascular: Negative. Gastrointestinal: Negative. Genitourinary: Negative. Musculoskeletal: Positive for back pain (Buldging disc) and neck pain. Skin: Negative. Neurological: Positive for tingling. Endo/Heme/Allergies: Negative. Psychiatric/Behavioral: Negative. Objective:   No flowsheet data found.      [INSTRUCTIONS:  \"[x]\" Indicates a positive item  \"[]\" Indicates a negative item  -- DELETE ALL ITEMS NOT EXAMINED]    Constitutional: [x] Appears well-developed and well-nourished [x] No apparent distress      [] Abnormal -     Mental status: [x] Alert and awake  [x] Oriented to person/place/time [x] Able to follow commands    [] Abnormal -     Eyes:   EOM    [x]  Normal    [] Abnormal -   Sclera  [x]  Normal    [] Abnormal -          Discharge [x]  None visible   [] Abnormal -     HENT: [x] Normocephalic, atraumatic  [] Abnormal -   [x] Mouth/Throat: Mucous membranes are moist    External Ears [x] Normal  [] Abnormal -    Neck: [x] No visualized mass [] Abnormal -     Pulmonary/Chest: [x] Respiratory effort normal   [x] No visualized signs of difficulty breathing or respiratory distress        [] Abnormal -      Musculoskeletal:   [x] Normal gait with no signs of ataxia         [x] Normal range of motion of neck        [] Abnormal -     Neurological:        [x] No Facial Asymmetry (Cranial nerve 7 motor function) (limited exam due to video visit)          [x] No gaze palsy        [] Abnormal -          Skin:        [x] No significant exanthematous lesions or discoloration noted on facial skin         [] Abnormal -            Psychiatric:       [x] Normal Affect [] Abnormal -        [x] No Hallucinations    Other pertinent observable physical exam findings:-        We discussed the expected course, resolution and complications of the diagnosis(es) in detail. Medication risks, benefits, costs, interactions, and alternatives were discussed as indicated. I advised him to contact the office if his condition worsens, changes or fails to improve as anticipated. He expressed understanding with the diagnosis(es) and plan. Rene Vasquez, was evaluated through a synchronous (real-time) audio-video encounter. The patient (or guardian if applicable) is aware that this is a billable service. Verbal consent to proceed has been obtained within the past 12 months. The visit was conducted pursuant to the emergency declaration under the [address] [place] [address] authority and the [employer] General Act. Patient identification was verified, and a caregiver was present when appropriate. The patient was located in a state where the provider was credentialed to provide care.     Written by Poteau Eric, as dictated by Dr. Meghan Vance MD.     Doe Many

## 2022-01-05 NOTE — PROGRESS NOTES
Identified pt with two pt identifiers(name and ). Reviewed record in preparation for visit and have obtained necessary documentation. Chief Complaint   Patient presents with    Pain (Chronic)     fingers and left arm. left knee swelling. feels like pins in finger and arm. Worse in the morning and sometimes in the night while laying down. There were no vitals filed for this visit. Health Maintenance Due   Topic    DTaP/Tdap/Td series (1 - Tdap)    Colorectal Cancer Screening Combo     Shingrix Vaccine Age 50> (1 of 2)    Medicare Yearly Exam     Low dose CT lung screening     Flu Vaccine (1)    COVID-19 Vaccine (3 - Booster for [place] series)       Coordination of Care Questionnaire:  :   1) Have you been to an emergency room, urgent care, or hospitalized since your last visit? If yes, where when, and reason for visit? no       2. Have seen or consulted any other health care provider since your last visit? If yes, where when, and reason for visit? NO      An electronic signature was used to authenticate this note.   -- Evelina Dorsey LPN

## 2022-01-17 ENCOUNTER — APPOINTMENT (OUTPATIENT)
Dept: GENERAL RADIOLOGY | Age: 72
End: 2022-01-17
Attending: PHYSICIAN ASSISTANT
Payer: MEDICARE

## 2022-01-17 ENCOUNTER — HOSPITAL ENCOUNTER (EMERGENCY)
Age: 72
Discharge: HOME | End: 2022-01-17
Attending: EMERGENCY MEDICINE
Payer: MEDICARE

## 2022-01-17 VITALS
SYSTOLIC BLOOD PRESSURE: 153 MMHG | HEIGHT: 71 IN | DIASTOLIC BLOOD PRESSURE: 72 MMHG | TEMPERATURE: 98 F | RESPIRATION RATE: 18 BRPM | HEART RATE: 67 BPM | BODY MASS INDEX: 27.53 KG/M2 | WEIGHT: 196.65 LBS | OXYGEN SATURATION: 100 %

## 2022-01-17 DIAGNOSIS — M54.12 CERVICAL RADICULOPATHY: Primary | ICD-10-CM

## 2022-01-17 DIAGNOSIS — M50.30 DDD (DEGENERATIVE DISC DISEASE), CERVICAL: ICD-10-CM

## 2022-01-17 DIAGNOSIS — M25.561 ACUTE PAIN OF RIGHT KNEE: ICD-10-CM

## 2022-01-17 DIAGNOSIS — M17.11 ARTHRITIS OF KNEE, RIGHT: ICD-10-CM

## 2022-01-17 PROCEDURE — 73562 X-RAY EXAM OF KNEE 3: CPT

## 2022-01-17 PROCEDURE — 72050 X-RAY EXAM NECK SPINE 4/5VWS: CPT

## 2022-01-17 PROCEDURE — 99281 EMR DPT VST MAYX REQ PHY/QHP: CPT

## 2022-01-17 RX ORDER — IBUPROFEN 600 MG/1
600 TABLET ORAL
Qty: 20 TABLET | Refills: 0 | Status: SHIPPED | OUTPATIENT
Start: 2022-01-17 | End: 2022-10-17 | Stop reason: CLARIF

## 2022-01-17 RX ORDER — PREDNISONE 10 MG/1
TABLET ORAL
Qty: 21 TABLET | Refills: 0 | Status: SHIPPED | OUTPATIENT
Start: 2022-01-17 | End: 2022-06-29 | Stop reason: ALTCHOICE

## 2022-01-17 RX ORDER — HYDROCODONE BITARTRATE AND ACETAMINOPHEN 5; 325 MG/1; MG/1
1 TABLET ORAL
Qty: 9 TABLET | Refills: 0 | Status: SHIPPED | OUTPATIENT
Start: 2022-01-17 | End: 2022-01-20

## 2022-01-18 NOTE — ED PROVIDER NOTES
EMERGENCY DEPARTMENT HISTORY AND PHYSICAL EXAM      Date: 1/17/2022  Patient Name: Karl Ponce    History of Presenting Illness     Chief Complaint   Patient presents with    Shoulder Pain     with neck x 2-3 weeks, denies traumatic injury    Knee Pain     R knee swelling and pain per patient with histroy of arthritis       History Provided By: Patient    HPI: Karl Ponce, 66 y.o. male history of arthritis, DJD and hypertension presents ambulatory to the ED with cc of several weeks or longer of 10 out of 10 constant, achy left-sided neck pain and right knee pain both of which are worse with movement and for which he has seen no lasting improvement with OTC Aleve. He denies any specific injuries. He tells me he does have an appointment this Friday for his knee and is pending an appointment sometime in March for his neck. He denies any fevers lately. He has no known medication allergies. He takes no blood thinner or medications for diabetes. He denies any history of gout. There are no other complaints, changes, or physical findings at this time. PCP: Kulwinder Avila MD    Current Outpatient Medications   Medication Sig Dispense Refill    ibuprofen (MOTRIN) 600 mg tablet Take 1 Tablet by mouth every six (6) hours as needed for Pain. 20 Tablet 0    predniSONE (STERAPRED DS) 10 mg dose pack Per Dose Pack instructions 21 Tablet 0    HYDROcodone-acetaminophen (NORCO) 5-325 mg per tablet Take 1 Tablet by mouth every eight (8) hours as needed for Pain for up to 3 days. Max Daily Amount: 3 Tablets. 9 Tablet 0    lisinopriL (PRINIVIL, ZESTRIL) 20 mg tablet TAKE 1 AND 1/2 TABLETS BY MOUTH DAILY 135 Tablet 1    metoprolol succinate (TOPROL-XL) 50 mg XL tablet TAKE 1 TABLET BY MOUTH DAILY 90 Tablet 1    amLODIPine (NORVASC) 10 mg tablet TAKE 1 TABLET BY MOUTH EVERY DAY 90 Tablet 2    Omeprazole delayed release (PRILOSEC D/R) 20 mg tablet Take 1 Tab by mouth daily.  (Patient taking differently: Take 20 mg by mouth daily as needed.) 30 Tab 3    latanoprost (XALATAN) 0.005 % ophthalmic solution Administer 1 Drop to both eyes nightly. Past History     Past Medical History:  Past Medical History:   Diagnosis Date    Arthritis     DJD (degenerative joint disease) of hip     Hypertension        Past Surgical History:  Past Surgical History:   Procedure Laterality Date    HX ORTHOPAEDIC  2004    Total R hip replacement       Family History:  Family History   Problem Relation Age of Onset    Hypertension Mother     Coronary Art Dis Maternal Grandmother     Coronary Art Dis Maternal Grandfather     Hypertension Sister        Social History:  Social History     Tobacco Use    Smoking status: Former Smoker     Packs/day: 1.00     Years: 20.00     Pack years: 20.00     Quit date: 10/1/2017     Years since quittin.2    Smokeless tobacco: Never Used    Tobacco comment: doesn't inhale   Substance Use Topics    Alcohol use: Yes     Comment: occasionally cachorro    Drug use: No       Allergies:  No Known Allergies  Review of Systems   Review of Systems   Constitutional: Negative for fever. Musculoskeletal: Positive for neck pain. Right knee pain   Neurological: Negative for weakness. All other systems reviewed and are negative. Physical Exam   Physical Exam  Vitals and nursing note reviewed. Constitutional:       General: He is not in acute distress. Appearance: He is well-developed. He is not toxic-appearing. HENT:      Head: Normocephalic and atraumatic. No right periorbital erythema or left periorbital erythema. Right Ear: External ear normal.      Left Ear: External ear normal.      Nose: Nose normal.      Mouth/Throat:      Lips: No lesions. Eyes:      General: No scleral icterus. Conjunctiva/sclera: Conjunctivae normal.      Pupils: Pupils are equal, round, and reactive to light. Cardiovascular:      Rate and Rhythm: Normal rate.    Pulmonary:      Effort: Pulmonary effort is normal. No respiratory distress. Abdominal:      General: There is no distension. Palpations: Abdomen is not rigid. Tenderness: There is no guarding. Musculoskeletal:         General: Normal range of motion. Cervical back: Normal range of motion. Tenderness present. Back:       Right knee: Tenderness present. Comments: CERVICAL SPINE:  Full active range of motion of all extremities  No bruising, redness or swelling  Left-sided neck pain that radiates slightly down the left shoulder and arm    RIGHT KNEE:  Ambulates with a normal gait no limp  Able to flex knee > 90 deg  No bruising, redness or deformity  Good symmetry; no appreciable swelling  Anterior tenderness  Provocative maneuvers deferred       Skin:     Findings: No rash. Neurological:      Mental Status: He is alert and oriented to person, place, and time. He is not disoriented. Cranial Nerves: No cranial nerve deficit. Sensory: No sensory deficit. Psychiatric:         Speech: Speech normal.       Diagnostic Study Results     Labs -   No results found for this or any previous visit (from the past 12 hour(s)). Radiologic Studies -   XR SPINE CERV 4 OR 5 V   Final Result      XR KNEE RT 3 V   Final Result   No acute abnormality. CT Results  (Last 48 hours)    None        CXR Results  (Last 48 hours)    None        Medical Decision Making   I am the first provider for this patient. I reviewed the vital signs, available nursing notes, past medical history, past surgical history, family history and social history. Vital Signs-Reviewed the patient's vital signs.   Patient Vitals for the past 12 hrs:   Temp Pulse Resp BP SpO2   01/17/22 1618 98 °F (36.7 °C) 67 18 (!) 153/72 100 %       Pulse Oximetry Analysis - 100% on RA    Records Reviewed: Nursing Notes, Old Medical Records, Previous Radiology Studies and Previous Laboratory Studies    Provider Notes (Medical Decision Making):   DDx: Compression fracture, HNP, DDD, cervical radiculopathy; doubt fracture, DJD, effusion, internal derangement    Afebrile and well-appearing. Patient presents ambulatory with weeks or longer of neck and right knee pain. Plain films demonstrate cervical disc disease without compression fracture. Plain films of the right knee demonstrate osteoarthritis without effusion or other acute findings. Reassuring exam. Additional testing deferred. Will offer medications for pain and refer to ortho. ED Course:   Initial assessment performed. The patients presenting problems have been discussed, and they are in agreement with the care plan formulated and outlined with them. I have encouraged them to ask questions as they arise throughout their visit. Disposition:  Discharge    PLAN:  1. Discharge Medication List as of 1/17/2022  7:48 PM      START taking these medications    Details   ibuprofen (MOTRIN) 600 mg tablet Take 1 Tablet by mouth every six (6) hours as needed for Pain., Normal, Disp-20 Tablet, R-0      predniSONE (STERAPRED DS) 10 mg dose pack Per Dose Pack instructions, Normal, Disp-21 Tablet, R-0      HYDROcodone-acetaminophen (NORCO) 5-325 mg per tablet Take 1 Tablet by mouth every eight (8) hours as needed for Pain for up to 3 days. Max Daily Amount: 3 Tablets., Normal, Disp-9 Tablet, R-0         CONTINUE these medications which have NOT CHANGED    Details   lisinopriL (PRINIVIL, ZESTRIL) 20 mg tablet TAKE 1 AND 1/2 TABLETS BY MOUTH DAILY, Normal, Disp-135 Tablet, R-1      metoprolol succinate (TOPROL-XL) 50 mg XL tablet TAKE 1 TABLET BY MOUTH DAILY, Normal, Disp-90 Tablet, R-1      amLODIPine (NORVASC) 10 mg tablet TAKE 1 TABLET BY MOUTH EVERY DAY, Normal, Disp-90 Tablet, R-2      Omeprazole delayed release (PRILOSEC D/R) 20 mg tablet Take 1 Tab by mouth daily. , Normal, Disp-30 Tab, R-3      latanoprost (XALATAN) 0.005 % ophthalmic solution Administer 1 Drop to both eyes nightly., Historical Med           2. Follow-up Information     Follow up With Specialties Details Why Contact Info    Sammie Canas MD Orthopedic Surgery  ORTHO: keep your appointment this Friday [address]  [address]  [facility].  [phone]          Return to ED if worse     Diagnosis     Clinical Impression:   1. Cervical radiculopathy    2. Acute pain of right knee    3. DDD (degenerative disc disease), cervical    4.  Arthritis of knee, right

## 2022-02-22 ENCOUNTER — DOCUMENTATION ONLY (OUTPATIENT)
Dept: INTERNAL MEDICINE CLINIC | Age: 72
End: 2022-02-22

## 2022-02-22 ENCOUNTER — TELEPHONE (OUTPATIENT)
Dept: INTERNAL MEDICINE CLINIC | Age: 72
End: 2022-02-22

## 2022-02-22 DIAGNOSIS — M25.50 ARTHRALGIA, UNSPECIFIED JOINT: Primary | ICD-10-CM

## 2022-02-22 NOTE — TELEPHONE ENCOUNTER
----- Message from Libia Huang sent at 2/22/2022  1:05 PM EST -----  Subject: Message to Provider    QUESTIONS  Information for Provider? patient needs a referral asap to see Dr. Viri Mendez a spine doctor that Dr. Narciso Fairbanks doctor set up for him on   02/24/2022, please call once this is done  ---------------------------------------------------------------------------  --------------  [address]  What is the best way for the office to contact you? OK to leave message on   voicemail  Preferred Call Back Phone Number? [phone]  ---------------------------------------------------------------------------  --------------  SCRIPT ANSWERS  Relationship to Patient?  Self

## 2022-03-07 ENCOUNTER — TELEPHONE (OUTPATIENT)
Dept: INTERNAL MEDICINE CLINIC | Age: 72
End: 2022-03-07

## 2022-03-07 NOTE — TELEPHONE ENCOUNTER
Spoke with patient. Two pt identifiers confirmed. Patient states that he was referred to Dr. Johanne Nguyen by [facility]. Patient states that he drove all the way to the [address] [place] [address] to find out that he did not have an appointment with that office and they do not take his insurance. Patient states that he needs the name of someone close by. Patient provided with contact information for Dr. Swetha Sweet. Patient advised to contact the office if she does not accept his insurance or if additional information is needed from Dr. Adriel Sylvester.  Pt verbalized understanding of information discussed w/ no further questions at this time.    Estela Tobar LPN

## 2022-03-07 NOTE — TELEPHONE ENCOUNTER
Caller states that patient insurance is not taken by Dr Nettie Prabhakar      This psr advised pt to contact their insurance and ask what doctors are covered, then let us know for which to order the referral.    Pt states will check with insurance and contact the office to let us know who would be covered for rheumatology referral.    Awaiting update from patient

## 2022-03-07 NOTE — TELEPHONE ENCOUNTER
----- Message from [facility] sent at 3/7/2022 10:46 AM EST -----  Subject: Message to Provider    QUESTIONS  Information for Provider? Please have Jesus Coleman or her nurse/MA to call   Jase Ro about the referral for Rhuematology ASAP. The referral that   was put in was sent to someone who doesn't take [facility].   ---------------------------------------------------------------------------  --------------  [address]  What is the best way for the office to contact you? OK to leave message on   voicemail  Preferred Call Back Phone Number? [phone]  ---------------------------------------------------------------------------  --------------  SCRIPT ANSWERS  Relationship to Patient?  Self

## 2022-03-08 ENCOUNTER — TELEPHONE (OUTPATIENT)
Dept: INTERNAL MEDICINE CLINIC | Age: 72
End: 2022-03-08

## 2022-03-08 DIAGNOSIS — M25.561 ACUTE PAIN OF RIGHT KNEE: Primary | ICD-10-CM

## 2022-03-08 NOTE — TELEPHONE ENCOUNTER
----- Message from Cheri Gandhi sent at 3/8/2022  3:39 PM EST -----  Subject: Referral Request    QUESTIONS   Reason for referral request? Patient is needing a referral a referral sent   [facility] [address] [facility] number   [phone]   Has the physician seen you for this condition before? No   Preferred Specialist (if applicable)? Do you already have an appointment scheduled? Yes  Select Scheduled Date? 2022-03-15  Select Scheduled Physician? Outside Physician - Dinora Griffin  Additional Information for Provider?   ---------------------------------------------------------------------------  --------------  [address]  What is the best way for the office to contact you? OK to leave message on   voicemail  Preferred Call Back Phone Number?  [phone]

## 2022-03-19 PROBLEM — E78.2 MIXED HYPERLIPIDEMIA: Status: ACTIVE | Noted: 2021-05-11

## 2022-03-20 PROBLEM — I50.22 SYSTOLIC CHF, CHRONIC (HCC): Status: ACTIVE | Noted: 2021-05-11

## 2022-04-07 ENCOUNTER — TELEPHONE (OUTPATIENT)
Dept: INTERNAL MEDICINE CLINIC | Age: 72
End: 2022-04-07

## 2022-04-07 DIAGNOSIS — S83.209A CURRENT TEAR OF MENISCUS OF KNEE, UNSPECIFIED LATERALITY, UNSPECIFIED MENISCUS, UNSPECIFIED TEAR TYPE, INITIAL ENCOUNTER: Primary | ICD-10-CM

## 2022-04-07 NOTE — TELEPHONE ENCOUNTER
----- Message from Geri Layne sent at 4/6/2022  3:09 PM EDT -----  Subject: Referral Request    QUESTIONS   Reason for referral request? Patient states that he had a MRI done on his   knee by Dr. Sheba Jackson and she is requesting for him to see a Ortho Knee   Specialist located at [facility]. Dr. Sheba Jackson stated that he had   a torn meniscus in his knee and he has bone grinding on each other which   is causing him a lot of pain. Has the physician seen you for this condition before? No   Preferred Specialist (if applicable)? Do you already have an appointment scheduled? No  Additional Information for Provider?   ---------------------------------------------------------------------------  --------------  CALL BACK INFO  What is the best way for the office to contact you? OK to leave message on   voicemail  Preferred Call Back Phone Number? [phone]  ---------------------------------------------------------------------------  --------------  SCRIPT ANSWERS  Relationship to Patient?  Self

## 2022-04-08 ENCOUNTER — TELEPHONE (OUTPATIENT)
Dept: INTERNAL MEDICINE CLINIC | Age: 72
End: 2022-04-08

## 2022-04-08 NOTE — TELEPHONE ENCOUNTER
----- Message from Margie Garcia sent at 4/8/2022 12:17 PM EDT -----  Subject: Message to Provider    QUESTIONS  Information for Provider? pt is calling to see about his message that he   had sent out on the 6 he would like a response about his knee referral   ---------------------------------------------------------------------------  --------------  [address]  What is the best way for the office to contact you? OK to leave message on   voicemail  Preferred Call Back Phone Number? [phone]  ---------------------------------------------------------------------------  --------------  SCRIPT ANSWERS  Relationship to Patient?  Self

## 2022-04-11 ENCOUNTER — TELEPHONE (OUTPATIENT)
Dept: INTERNAL MEDICINE CLINIC | Age: 72
End: 2022-04-11

## 2022-04-11 NOTE — TELEPHONE ENCOUNTER
Spoke with patient. Two pt identifiers confirmed. Patient given referral information for Dr. Quique Mcguire. Pt verbalized understanding of information discussed w/ no further questions at this time.    Yana Mclean LPN

## 2022-04-11 NOTE — TELEPHONE ENCOUNTER
----- Message from Milind Barba sent at 4/11/2022  9:34 AM EDT -----  Subject: Message to Provider    QUESTIONS  Information for Provider? Patient would like a call back on the status of   his referral as he has not gotten a call yet about it.   ---------------------------------------------------------------------------  --------------  [address]  What is the best way for the office to contact you? OK to leave message on   voicemail  Preferred Call Back Phone Number? [phone]  ---------------------------------------------------------------------------  --------------  SCRIPT ANSWERS  Relationship to Patient?  Self

## 2022-04-22 ENCOUNTER — OFFICE VISIT (OUTPATIENT)
Dept: ORTHOPEDIC SURGERY | Age: 72
End: 2022-04-22
Payer: MEDICARE

## 2022-04-22 VITALS
BODY MASS INDEX: 26.88 KG/M2 | RESPIRATION RATE: 16 BRPM | SYSTOLIC BLOOD PRESSURE: 131 MMHG | WEIGHT: 192 LBS | HEIGHT: 71 IN | TEMPERATURE: 98 F | HEART RATE: 67 BPM | DIASTOLIC BLOOD PRESSURE: 70 MMHG

## 2022-04-22 DIAGNOSIS — M17.11 UNILATERAL PRIMARY OSTEOARTHRITIS, RIGHT KNEE: Primary | ICD-10-CM

## 2022-04-22 PROCEDURE — G8510 SCR DEP NEG, NO PLAN REQD: HCPCS | Performed by: ORTHOPAEDIC SURGERY

## 2022-04-22 PROCEDURE — 99203 OFFICE O/P NEW LOW 30 MIN: CPT | Performed by: ORTHOPAEDIC SURGERY

## 2022-04-22 PROCEDURE — G8419 CALC BMI OUT NRM PARAM NOF/U: HCPCS | Performed by: ORTHOPAEDIC SURGERY

## 2022-04-22 PROCEDURE — 1101F PT FALLS ASSESS-DOCD LE1/YR: CPT | Performed by: ORTHOPAEDIC SURGERY

## 2022-04-22 PROCEDURE — G8427 DOCREV CUR MEDS BY ELIG CLIN: HCPCS | Performed by: ORTHOPAEDIC SURGERY

## 2022-04-22 PROCEDURE — G8752 SYS BP LESS 140: HCPCS | Performed by: ORTHOPAEDIC SURGERY

## 2022-04-22 PROCEDURE — G8536 NO DOC ELDER MAL SCRN: HCPCS | Performed by: ORTHOPAEDIC SURGERY

## 2022-04-22 PROCEDURE — 3017F COLORECTAL CA SCREEN DOC REV: CPT | Performed by: ORTHOPAEDIC SURGERY

## 2022-04-22 PROCEDURE — G8754 DIAS BP LESS 90: HCPCS | Performed by: ORTHOPAEDIC SURGERY

## 2022-04-22 RX ORDER — NAPROXEN 500 MG/1
500 TABLET ORAL 2 TIMES DAILY WITH MEALS
COMMUNITY
End: 2022-06-29 | Stop reason: ALTCHOICE

## 2022-04-22 NOTE — LETTER
4/22/2022    Patient: Chaya Trimble   YOB: 1950   Date of Visit: 4/22/2022     Eleazar Briones MD  [address]  [facility]    Dear Eleazar Briones MD,      Thank you for referring Mr. Jennifer Miller to [facility] for evaluation. My notes for this consultation are attached. If you have questions, please do not hesitate to call me. I look forward to following your patient along with you.       Sincerely,    Stewart Kinds, DO

## 2022-04-22 NOTE — PROGRESS NOTES
Chief Complaint   Patient presents with    Knee Pain     Pt reports several \"rips\" in his right knee.       Visit Vitals  /70   Pulse 67   Temp 98 °F (36.7 °C)   Resp 16   Ht 5' 11\" (1.803 m)   Wt 192 lb (87.1 kg)   BMI 26.78 kg/m²

## 2022-04-27 ENCOUNTER — TELEPHONE (OUTPATIENT)
Dept: ORTHOPEDIC SURGERY | Age: 72
End: 2022-04-27

## 2022-05-05 ENCOUNTER — TELEPHONE (OUTPATIENT)
Dept: ORTHOPEDIC SURGERY | Age: 72
End: 2022-05-05

## 2022-05-05 NOTE — TELEPHONE ENCOUNTER
Auth for Orthovisc rcvd from [employer]. Contacted pt to schedule series.      Auth Number [id number]  5/2/22-5/30/22

## 2022-05-10 ENCOUNTER — OFFICE VISIT (OUTPATIENT)
Dept: ORTHOPEDIC SURGERY | Age: 72
End: 2022-05-10
Payer: MEDICARE

## 2022-05-10 VITALS
OXYGEN SATURATION: 99 % | DIASTOLIC BLOOD PRESSURE: 76 MMHG | BODY MASS INDEX: 27.3 KG/M2 | TEMPERATURE: 97.6 F | HEART RATE: 70 BPM | HEIGHT: 71 IN | SYSTOLIC BLOOD PRESSURE: 155 MMHG | WEIGHT: 195 LBS

## 2022-05-10 DIAGNOSIS — M17.11 UNILATERAL PRIMARY OSTEOARTHRITIS, RIGHT KNEE: Primary | ICD-10-CM

## 2022-05-10 PROCEDURE — 20610 DRAIN/INJ JOINT/BURSA W/O US: CPT | Performed by: ORTHOPAEDIC SURGERY

## 2022-05-10 NOTE — PROGRESS NOTES
Identified pt with two pt identifiers (name and ). Reviewed chart in preparation for visit and have obtained necessary documentation. Jennifer Sorto is a 74 y.o. male  Chief Complaint   Patient presents with    Joint Or Tendon Injection     RT knee 1st Orthovisc      Visit Vitals  BP (!) 155/76 (BP 1 Location: Right arm, BP Patient Position: Sitting, BP Cuff Size: Large adult)   Pulse 70   Temp 97.6 °F (36.4 °C) (Tympanic)   Ht 5' 11\" (1.803 m)   Wt 195 lb (88.5 kg)   SpO2 99%   BMI 27.20 kg/m²     1. Have you been to the ER, urgent care clinic since your last visit? Hospitalized since your last visit? No    2. Have you seen or consulted any other health care providers outside of the [address] [place] [address] since your last visit? Include any pap smears or colon screening.  No

## 2022-05-10 NOTE — PROGRESS NOTES
Date of Procedure: 5/10/2022  PROCEDURE NOTE: Right knee injection of orthovisc    Consent was obtained from the patient. The correct site was identified after confirmation with the patient. Following identification and confirmation of the correct site with the patient, the superolateral right knee was prepped in the normal sterile fashion. A local anesthetic of 1% lidocaine without epinephrine was then administered to the local tissues. Following, an injection of 30 mg orthovisc viscosupplementation prefilled syringe was administered to the right knee. The needle was then withdrawn and the injection site dressed with a sterile bandage at the conclusion. The procedure was well tolerated by the patient. No immediate adverse reactions were noted. Post injection instructions were given.

## 2022-05-17 ENCOUNTER — OFFICE VISIT (OUTPATIENT)
Dept: ORTHOPEDIC SURGERY | Age: 72
End: 2022-05-17
Payer: MEDICARE

## 2022-05-17 VITALS
HEIGHT: 71 IN | SYSTOLIC BLOOD PRESSURE: 139 MMHG | BODY MASS INDEX: 27.3 KG/M2 | WEIGHT: 195 LBS | DIASTOLIC BLOOD PRESSURE: 62 MMHG

## 2022-05-17 DIAGNOSIS — M17.11 UNILATERAL PRIMARY OSTEOARTHRITIS, RIGHT KNEE: Primary | ICD-10-CM

## 2022-05-17 PROCEDURE — 20610 DRAIN/INJ JOINT/BURSA W/O US: CPT | Performed by: ORTHOPAEDIC SURGERY

## 2022-05-17 NOTE — PROGRESS NOTES
Date of Procedure: 5/17/2022  PROCEDURE NOTE: Right knee injection of orthovisc    Consent was obtained from the patient. The correct site was identified after confirmation with the patient. Following identification and confirmation of the correct site with the patient, the superolateral right knee was prepped in the normal sterile fashion. A local anesthetic of 1% lidocaine without epinephrine was then administered to the local tissues. Following, an injection of 30 mg orthovisc viscosupplementation prefilled syringe was administered to the right knee. The needle was then withdrawn and the injection site dressed with a sterile bandage at the conclusion. The procedure was well tolerated by the patient. No immediate adverse reactions were noted. Post injection instructions were given.

## 2022-05-26 ENCOUNTER — OFFICE VISIT (OUTPATIENT)
Dept: ORTHOPEDIC SURGERY | Age: 72
End: 2022-05-26
Payer: MEDICARE

## 2022-05-26 VITALS
BODY MASS INDEX: 27.3 KG/M2 | HEART RATE: 73 BPM | DIASTOLIC BLOOD PRESSURE: 77 MMHG | HEIGHT: 71 IN | WEIGHT: 195 LBS | TEMPERATURE: 96.7 F | OXYGEN SATURATION: 99 % | SYSTOLIC BLOOD PRESSURE: 154 MMHG

## 2022-05-26 DIAGNOSIS — M17.11 UNILATERAL PRIMARY OSTEOARTHRITIS, RIGHT KNEE: Primary | ICD-10-CM

## 2022-05-26 PROCEDURE — 20610 DRAIN/INJ JOINT/BURSA W/O US: CPT | Performed by: ORTHOPAEDIC SURGERY

## 2022-05-26 NOTE — PROGRESS NOTES
Identified pt with two pt identifiers (name and ). Reviewed chart in preparation for visit and have obtained necessary documentation. Beto Curry is a 74 y.o. male  Chief Complaint   Patient presents with    Joint Or Tendon Injection     RT knee 3rd Orthovisc      Visit Vitals  BP (!) 154/77 (BP 1 Location: Right arm, BP Patient Position: Sitting, BP Cuff Size: Large adult)   Pulse 73   Temp (!) 96.7 °F (35.9 °C) (Tympanic)   Ht 5' 11\" (1.803 m)   Wt 195 lb (88.5 kg)   SpO2 99%   BMI 27.20 kg/m²     1. Have you been to the ER, urgent care clinic since your last visit? Hospitalized since your last visit? No    2. Have you seen or consulted any other health care providers outside of the [address] [place] [address] since your last visit? Include any pap smears or colon screening.  No

## 2022-05-26 NOTE — PROGRESS NOTES
Date of Procedure: 5/26/2022  PROCEDURE NOTE: Right knee injection of orthovisc    Consent was obtained from the patient. The correct site was identified after confirmation with the patient. Following identification and confirmation of the correct site with the patient, the superolateral right knee was prepped in the normal sterile fashion. A local anesthetic of 1% lidocaine without epinephrine was then administered to the local tissues. Following, an injection of 30 mg orthovisc viscosupplementation prefilled syringe was administered to the right knee. The needle was then withdrawn and the injection site dressed with a sterile bandage at the conclusion. The procedure was well tolerated by the patient. No immediate adverse reactions were noted. Post injection instructions were given.

## 2022-06-12 RX ORDER — LISINOPRIL 20 MG/1
TABLET ORAL
Qty: 135 TABLET | Refills: 1 | Status: SHIPPED | OUTPATIENT
Start: 2022-06-12

## 2022-06-13 ENCOUNTER — VIRTUAL VISIT (OUTPATIENT)
Dept: ORTHOPEDIC SURGERY | Age: 72
End: 2022-06-13
Payer: MEDICARE

## 2022-06-13 DIAGNOSIS — M17.11 UNILATERAL PRIMARY OSTEOARTHRITIS, RIGHT KNEE: Primary | ICD-10-CM

## 2022-06-13 PROCEDURE — 99441 PR PHYS/QHP TELEPHONE EVALUATION 5-10 MIN: CPT | Performed by: ORTHOPAEDIC SURGERY

## 2022-06-13 NOTE — PROGRESS NOTES
2022      CC: right knee pain    HPI:      This is a 74y.o. year old male who presents for follow up of their right knee injection. The patient states that they have had very good relief of their pain. The time since injection has been approximately a week. PMH:  Past Medical History:   Diagnosis Date    Arthritis     DJD (degenerative joint disease) of hip     Hypertension        PSxHx:  Past Surgical History:   Procedure Laterality Date    HX ORTHOPAEDIC      Total R hip replacement       Meds:    Current Outpatient Medications:     lisinopriL (PRINIVIL, ZESTRIL) 20 mg tablet, TAKE 1 AND 1/2 TABLETS BY MOUTH DAILY, Disp: 135 Tablet, Rfl: 1    naproxen (NAPROSYN) 500 mg tablet, Take 500 mg by mouth two (2) times daily (with meals). (Patient not taking: Reported on 5/10/2022), Disp: , Rfl:     amLODIPine (NORVASC) 10 mg tablet, TAKE 1 TABLET BY MOUTH EVERY DAY, Disp: 90 Tablet, Rfl: 1    ibuprofen (MOTRIN) 600 mg tablet, Take 1 Tablet by mouth every six (6) hours as needed for Pain., Disp: 20 Tablet, Rfl: 0    predniSONE (STERAPRED DS) 10 mg dose pack, Per Dose Pack instructions (Patient not taking: Reported on 2022), Disp: 21 Tablet, Rfl: 0    metoprolol succinate (TOPROL-XL) 50 mg XL tablet, TAKE 1 TABLET BY MOUTH DAILY, Disp: 90 Tablet, Rfl: 1    Omeprazole delayed release (PRILOSEC D/R) 20 mg tablet, Take 1 Tab by mouth daily.  (Patient not taking: Reported on 2022), Disp: 30 Tab, Rfl: 3    latanoprost (XALATAN) 0.005 % ophthalmic solution, Administer 1 Drop to both eyes nightly., Disp: , Rfl:     All:  No Known Allergies    Social Hx:  Social History     Socioeconomic History    Marital status: SINGLE   Tobacco Use    Smoking status: Former Smoker     Packs/day: 1.00     Years: 20.00     Pack years: 20.00     Quit date: 10/1/2017     Years since quittin.7    Smokeless tobacco: Never Used    Tobacco comment: doesn't inhale   Substance and Sexual Activity    Alcohol use: Yes     Comment: occasionally cachorro    Drug use: No    Sexual activity: Not Currently       Family Hx:  Family History   Problem Relation Age of Onset    Hypertension Mother     Coronary Art Dis Maternal Grandmother     Coronary Art Dis Maternal Grandfather     Hypertension Sister          Review of Systems:       General: Denies headache, lethargy, fever, weight loss  Ears/Nose/Throat: Denies ear discharge, drainage, nosebleeds, hoarse voice, dental problems  Cardiovascular: Denies chest pain, shortness of breath  Lungs: Denies chest pain, breathing problems, wheezing, pneumonia  Stomach: Denies stomach pain, heartburn, constipation, irritable bowel  Skin: Denies rash, sores, open wounds  Musculoskeletal: right knee pain - resolved  Genitourinary: Denies dysuria, hematuria, polyuria  Gastrointestinal: Denies constipation, obstipation, diarrhea  Neurological: Denies changes in sight, smell, hearing, taste, seizures. Denies loss of consciousness. Psychiatric: Denies depression, sleep pattern changes, anxiety, change in personality  Endocrine: Denies mood swings, heat or cold intolerance  Hematologic/Lymphatic: Denies anemia, purpura, petechia  Allergic/Immunologic: Denies swelling of throat, pain or swelling at lymph nodes      Physical Examination:    There were no vitals taken for this visit. General: AOX3, no apparent distress  Psychiatric: mood and affect appropriate        Diagnostics:    Pertinent Diagnostics: none    Assessment: Status post right knee injection  Plan: This patient and I discussed the normal course for injections, we discussed that pain relief will likely be temporary to some degree, but I cannot predict the longevity of relief. We also discussed the limitations of injections, and that I cannot inject the same area any more often than every three months. We will proceed with continued observation. Patient was in [place] at the time of consultation.       I was in the office while conducting this encounter. Consent:  He and/or his healthcare decision maker is aware that this patient-initiated Telehealth encounter is a billable service, with coverage as determined by his insurance carrier. He is aware that he may receive a bill and has provided verbal consent to proceed: Yes    This virtual visit was conducted telephone encounter only. -  I affirm this is a Patient Initiated Episode with an Established Patient who has not had a related appointment within my department in the past 7 days or scheduled within the next 24 hours. Note: this encounter is not billable if this call serves to triage the patient into an appointment for the relevant concern. Total Time: minutes: 5-10 minutes. Mr. Ricardo Torres has a reminder for a \"due or due soon\" health maintenance. I have asked that he contact his primary care provider for follow-up on this health maintenance.

## 2022-06-14 DIAGNOSIS — I10 ESSENTIAL HYPERTENSION: ICD-10-CM

## 2022-06-17 RX ORDER — METOPROLOL SUCCINATE 50 MG/1
50 TABLET, EXTENDED RELEASE ORAL DAILY
Qty: 90 TABLET | Refills: 1 | Status: SHIPPED | OUTPATIENT
Start: 2022-06-17

## 2022-06-29 ENCOUNTER — OFFICE VISIT (OUTPATIENT)
Dept: INTERNAL MEDICINE CLINIC | Age: 72
End: 2022-06-29
Payer: MEDICARE

## 2022-06-29 VITALS
WEIGHT: 194.4 LBS | HEART RATE: 63 BPM | DIASTOLIC BLOOD PRESSURE: 66 MMHG | HEIGHT: 71 IN | RESPIRATION RATE: 16 BRPM | SYSTOLIC BLOOD PRESSURE: 133 MMHG | OXYGEN SATURATION: 96 % | TEMPERATURE: 97.2 F | BODY MASS INDEX: 27.22 KG/M2

## 2022-06-29 DIAGNOSIS — Z00.00 MEDICARE ANNUAL WELLNESS VISIT, SUBSEQUENT: Primary | ICD-10-CM

## 2022-06-29 DIAGNOSIS — Z12.11 SCREEN FOR COLON CANCER: ICD-10-CM

## 2022-06-29 DIAGNOSIS — Z87.891 PERSONAL HISTORY OF TOBACCO USE, PRESENTING HAZARDS TO HEALTH: ICD-10-CM

## 2022-06-29 DIAGNOSIS — Z12.5 SPECIAL SCREENING FOR MALIGNANT NEOPLASM OF PROSTATE: ICD-10-CM

## 2022-06-29 DIAGNOSIS — I10 ESSENTIAL HYPERTENSION: ICD-10-CM

## 2022-06-29 DIAGNOSIS — Z13.6 SCREENING FOR ISCHEMIC HEART DISEASE: ICD-10-CM

## 2022-06-29 PROCEDURE — 3017F COLORECTAL CA SCREEN DOC REV: CPT | Performed by: FAMILY MEDICINE

## 2022-06-29 PROCEDURE — G8752 SYS BP LESS 140: HCPCS | Performed by: FAMILY MEDICINE

## 2022-06-29 PROCEDURE — G0439 PPPS, SUBSEQ VISIT: HCPCS | Performed by: FAMILY MEDICINE

## 2022-06-29 PROCEDURE — G8510 SCR DEP NEG, NO PLAN REQD: HCPCS | Performed by: FAMILY MEDICINE

## 2022-06-29 PROCEDURE — G0296 VISIT TO DETERM LDCT ELIG: HCPCS | Performed by: FAMILY MEDICINE

## 2022-06-29 PROCEDURE — G8536 NO DOC ELDER MAL SCRN: HCPCS | Performed by: FAMILY MEDICINE

## 2022-06-29 PROCEDURE — G8427 DOCREV CUR MEDS BY ELIG CLIN: HCPCS | Performed by: FAMILY MEDICINE

## 2022-06-29 PROCEDURE — G8754 DIAS BP LESS 90: HCPCS | Performed by: FAMILY MEDICINE

## 2022-06-29 PROCEDURE — 1101F PT FALLS ASSESS-DOCD LE1/YR: CPT | Performed by: FAMILY MEDICINE

## 2022-06-29 PROCEDURE — G8417 CALC BMI ABV UP PARAM F/U: HCPCS | Performed by: FAMILY MEDICINE

## 2022-06-29 RX ORDER — ZOSTER VACCINE RECOMBINANT, ADJUVANTED 50 MCG/0.5
KIT INTRAMUSCULAR
Qty: 0.5 ML | Refills: 1 | Status: SHIPPED | OUTPATIENT
Start: 2022-06-29

## 2022-06-29 NOTE — PROGRESS NOTES
This is the Subsequent Medicare Annual Wellness Exam, performed 12 months or more after the Initial AWV or the last Subsequent AWV    I have reviewed the patient's medical history in detail and updated the computerized patient record. Assessment/Plan   Education and counseling provided:  Are appropriate based on today's review and evaluation  End-of-Life planning (with patient's consent)  Screening Pap and pelvic (covered once every 2 years)  Prostate cancer screening tests (PSA, covered annually)  Cardiovascular screening blood test    1. Medicare annual wellness visit, subsequent  -     REFERRAL TO [address]  2. Screening for ischemic heart disease  -     LIPID PANEL; Future  3. Special screening for malignant neoplasm of prostate  -     PSA SCREENING (SCREENING); Future  4. Screen for colon cancer  -     REFERRAL TO GASTROENTEROLOGY  5. Personal history of tobacco use, presenting hazards to health  -     CT LOW DOSE LUNG CANCER SCREENING; Future       Depression Risk Factor Screening     3 most recent PHQ Screens 6/29/2022   Little interest or pleasure in doing things Not at all   Feeling down, depressed, irritable, or hopeless Not at all   Total Score PHQ 2 0       Alcohol & Drug Abuse Risk Screen    Do you average more than 1 drink per night or more than 7 drinks a week: No    In the past three months have you have had more than 4 drinks containing alcohol on one occasion: No          Functional Ability and Level of Safety    Hearing: Hearing is good. Activities of Daily Living: The home contains: no safety equipment. Patient does total self care      Ambulation: with no difficulty     Fall Risk:  Fall Risk Assessment, last 12 mths 6/29/2022   Able to walk? Yes   Fall in past 12 months? 0   Do you feel unsteady? 0   Are you worried about falling 0   Is TUG test greater than 12 seconds?  -   Is the gait abnormal? -   Number of falls in past 12 months -   Fall with injury? -      Abuse Screen:  Patient is not abused       Cognitive Screening    Has your family/caregiver stated any concerns about your memory: no         Health Maintenance Due     Health Maintenance Due   Topic Date Due    DTaP/Tdap/Td series (1 - Tdap) Never done    Colorectal Cancer Screening Combo  Never done    Shingrix Vaccine Age 50> (1 of 2) Never done    Medicare Yearly Exam  05/17/2019    Low dose CT lung screening  10/29/2020    COVID-19 Vaccine (3 - Booster for Gutierrez Peter series) 09/01/2021       Patient Care Team   Patient Care Team:  Gloria Pineda MD as PCP - General (Family Medicine)  Gloria Pineda MD as PCP - [facility]  Dora Dixon MD (Ophthalmology)  Chantal Cardoso MD as Physician (Cardiovascular Disease Physician)    History     Patient Active Problem List   Diagnosis Code    Abnormal EKG R94.31    DJD (degenerative joint disease) of hip M16.9    Ejection fraction < 50% R94.30    HTN (hypertension) Y01    Systolic CHF, chronic ([facility] [address] I50.22    Mixed hyperlipidemia E78.2     Past Medical History:   Diagnosis Date    Arthritis     DJD (degenerative joint disease) of hip     Hypertension       Past Surgical History:   Procedure Laterality Date    HX ORTHOPAEDIC  2004    Total R hip replacement     Current Outpatient Medications   Medication Sig Dispense Refill    varicella-zoster recombinant, PF, (Shingrix, PF,) 50 mcg/0.5 mL susr injection 0.5mL by IntraMUSCular route once now and then repeat in 2-6 months 0.5 mL 1    metoprolol succinate (TOPROL-XL) 50 mg XL tablet Take 1 Tablet by mouth daily. 90 Tablet 1    lisinopriL (PRINIVIL, ZESTRIL) 20 mg tablet TAKE 1 AND 1/2 TABLETS BY MOUTH DAILY 135 Tablet 1    amLODIPine (NORVASC) 10 mg tablet TAKE 1 TABLET BY MOUTH EVERY DAY 90 Tablet 1    ibuprofen (MOTRIN) 600 mg tablet Take 1 Tablet by mouth every six (6) hours as needed for Pain.  20 Tablet 0    latanoprost (XALATAN) 0.005 % ophthalmic solution Administer 1 Drop to both eyes nightly. No Known Allergies    Family History   Problem Relation Age of Onset    Hypertension Mother     Coronary Art Dis Maternal Grandmother     Coronary Art Dis Maternal Grandfather     Hypertension Sister      Social History     Tobacco Use    Smoking status: Former Smoker     Packs/day: 1.00     Years: 20.00     Pack years: 20.00     Quit date: 10/1/2017     Years since quittin.7    Smokeless tobacco: Never Used    Tobacco comment: doesn't inhale   Substance Use Topics    Alcohol use: Yes     Comment: occasionally cachorro     ACP:ACP planning continued today. I explained the purpose of the advance medical directive. Patient expressed interest in reviewing material.  I provided the patient with a \"Your Right to Decide\" booklet, [employer] Kit, and a copy of an Advance Directive. Patient agrees to providing a copy to the office when available.  El Cruz MD   Discussed with the patient the current USPSTF guidelines released 2021 for screening for lung cancer. For adults aged 48 to [de-identified] years who have a 20 pack-year smoking history and currently smoke or have quit within the past 15 years the grade B recommendation is to:  Screen for lung cancer with low-dose computed tomography (LDCT) every year. Stop screening once a person has not smoked for 15 years or has a health problem that limits life expectancy or the ability to have lung surgery. The patient has a 20 pack-year history of cigarette smoking and currently is not smoking. Discussed with patient the risks and benefits of screening, including over-diagnosis, false positive rate, and total radiation exposure. The patient currently exhibits no signs or symptoms suggestive of lung cancer.   Discussed with patient the importance of compliance with yearly annual lung cancer screenings and willingness to undergo diagnosis and treatment if screening scan is positive. In addition, the patient was counseled regarding the importance of remaining smoke free and/or total smoking cessation.     Also reviewed the following if the patient has Medicare that as of February 10, 2022, Medicare only covers LDCT screening in patients aged 51-72 with at least a 20 pack-year smoking history who currently smoke or have quit in the last 15 years

## 2022-06-29 NOTE — PATIENT INSTRUCTIONS
Medicare Wellness Visit, Male    The best way to live healthy is to have a lifestyle where you eat a well-balanced diet, exercise regularly, limit alcohol use, and quit all forms of tobacco/nicotine, if applicable. Regular preventive services are another way to keep healthy. Preventive services (vaccines, screening tests, monitoring & exams) can help personalize your care plan, which helps you manage your own care. Screening tests can find health problems at the earliest stages, when they are easiest to treat. [facility] follows the current, evidence-based guidelines published by the [facility] ([facility]) when recommending preventive services for our patients. Because we follow these guidelines, sometimes recommendations change over time as research supports it. (For example, a prostate screening blood test is no longer routinely recommended for men with no symptoms). Of course, you and your doctor may decide to screen more often for some diseases, based on your risk and co-morbidities (chronic disease you are already diagnosed with). Preventive services for you include:  - Medicare offers their members a free annual wellness visit, which is time for you and your primary care provider to discuss and plan for your preventive service needs. Take advantage of this benefit every year!  -All adults over age 72 should receive the recommended pneumonia vaccines. Current USPSTF guidelines recommend a series of two vaccines for the best pneumonia protection.   -All adults should have a flu vaccine yearly and tetanus vaccine every 10 years.  -All adults age 48 and older should receive the shingles vaccines (series of two vaccines).        -All adults age 38-68 who are overweight should have a diabetes screening test once every three years.   -Other screening tests & preventive services for persons with diabetes include: an eye exam to screen for diabetic retinopathy, a kidney function test, a foot exam, and stricter control over your cholesterol.   -Cardiovascular screening for adults with routine risk involves an electrocardiogram (ECG) at intervals determined by the provider.   -Colorectal cancer screening should be done for adults age 54-65 with no increased risk factors for colorectal cancer. There are a number of acceptable methods of screening for this type of cancer. Each test has its own benefits and drawbacks. Discuss with your provider what is most appropriate for you during your annual wellness visit. The different tests include: colonoscopy (considered the best screening method), a fecal occult blood test, a fecal DNA test, and sigmoidoscopy.  -All adults born between [facility] should be screened once for Hepatitis C.  -An Abdominal Aortic Aneurysm (AAA) Screening is recommended for men age 73-68 who has ever smoked in their lifetime.      Here is a list of your current Health Maintenance items (your personalized list of preventive services) with a due date:  Health Maintenance Due   Topic Date Due    DTaP/Tdap/Td  (1 - Tdap) Never done    Colorectal Screening  Never done    Shingles Vaccine (1 of 2) Never done    Annual Well Visit  05/17/2019    Smoker or Former Smoker - Annual [address]  10/29/2020    COVID-19 Vaccine (3 - Booster for Gutierrez Peter series) 09/01/2021

## 2022-06-29 NOTE — PROGRESS NOTES
SUBJECTIVE:   Mr. Santosh Iraheta is a 74 y.o. male who is here for follow up of an annual wellness visit. He continues to take daily COVID precautions. He is interested in a prostate exam, since he notes an inconsistent urinary stream.     He follows up with Dr. Refugio Martinez, who noted no abnormalities with his heart. Right Knee Swelling: He followed up with Dr. Kevin Cali, who completed several injections for his knee. The gel injections were effective at relieving knee pain. He notes swelling of his right knee. Pt specifically denies hearing changes, memory changes, trouble with swallowing or taste, CP, SOB, heartburn or upset stomach, change in bowel habits, problems urinating, unusual joint or muscle pains, numbness or tingling in extremities, or skin lesions of concern. He notes a burning sensation of his toes. He notes achiness of his left ear. He notes fogginess of his left eye, which was due to immature cataracts. He has not smoked for 6 years. He notes sporadic ear discomfort. At this time, he is otherwise doing well and has brought no other complaints to my attention today. For a list of the medical issues addressed today, see the assessment and plan below. PREVENTIVE:  PSA: Ordered   Shingrix: Due  COVID: Booster (May 2022)        PMH:   Past Medical History:   Diagnosis Date    Arthritis     DJD (degenerative joint disease) of hip     Hypertension      PSH:  has a past surgical history that includes hx orthopaedic (2004). All: has No Known Allergies. MEDS:   Current Outpatient Medications   Medication Sig    varicella-zoster recombinant, PF, (Shingrix, PF,) 50 mcg/0.5 mL susr injection 0.5mL by IntraMUSCular route once now and then repeat in 2-6 months    metoprolol succinate (TOPROL-XL) 50 mg XL tablet Take 1 Tablet by mouth daily.     lisinopriL (PRINIVIL, ZESTRIL) 20 mg tablet TAKE 1 AND 1/2 TABLETS BY MOUTH DAILY    amLODIPine (NORVASC) 10 mg tablet TAKE 1 TABLET BY MOUTH EVERY DAY    ibuprofen (MOTRIN) 600 mg tablet Take 1 Tablet by mouth every six (6) hours as needed for Pain.  latanoprost (XALATAN) 0.005 % ophthalmic solution Administer 1 Drop to both eyes nightly. No current facility-administered medications for this visit. FH: family history includes Coronary Art Dis in his maternal grandfather and maternal grandmother; Hypertension in his mother and sister. SH:  reports that he quit smoking about 4 years ago. He has a 20.00 pack-year smoking history. He has never used smokeless tobacco. He reports current alcohol use. He reports that he does not use drugs. Review of Systems - History obtained from the patient  General ROS: no fever, chills, fatigue, body aches  Psychological ROS: no change in anxiety, depression, SI/HI  Ophthalmic ROS: no blurred vision, myopia, double vision  ENT ROS: no dysphagia, otalgia, otorrhea, rhinorrhea, post nasal drip  Respiratory ROS: no cough, shortness of breath, or wheezing  Cardiovascular ROS: no chest pain or dyspnea on exertion  Gastrointestinal ROS: no abdominal pain, change in bowel habits, or black or bloody stools  Genito-Urinary ROS: no frequency, urgency, incontinence, dysuria, hematouria  Musculoskeletal ROS: no arthralagia, myalgia  Neurological ROS: no headaches, dizziness, lightheadedness, tremors, seizures  Dermatological ROS: no rash or lesions    OBJECTIVE:   Vitals:   Visit Vitals  /66 (BP 1 Location: Right arm, BP Patient Position: Sitting, BP Cuff Size: Large adult)   Pulse 63   Temp 97.2 °F (36.2 °C) (Temporal)   Resp 16   Ht 5' 11\" (1.803 m)   Wt 194 lb 6.4 oz (88.2 kg)   SpO2 96%   BMI 27.11 kg/m²      Gen: Pleasant 74 y.o.  male in NAD. HEENT: PERRLA. EOMI. OP moist and pink. Neck: Supple. No LAD. HEART: RRR, No M/G/R.      LUNGS: CTAB No W/R. ABDOMEN: S, NT, ND, BS+. EXTREMITIES: Warm. No C/C/E.    MUSCULOSKELETAL: Normal ROM, muscle strength 5/5 all groups.     NEURO: Alert and oriented x 3.  Cranial nerves grossly intact. No focal sensory or motor deficits noted. SKIN: Warm. Dry. No rashes or other lesions noted. ASSESSMENT/ PLAN: Diagnoses and all orders for this visit:    1. Medicare annual wellness visit, subsequent  -     REFERRAL TO [address]    2. Screening for ischemic heart disease  -     LIPID PANEL; Future    3. Special screening for malignant neoplasm of prostate  -     PSA SCREENING (SCREENING); Future    4. Screen for colon cancer  -     REFERRAL TO GASTROENTEROLOGY    5. Personal history of tobacco use, presenting hazards to health  -     CT LOW DOSE LUNG CANCER SCREENING; Future    6. Essential hypertension  -     METABOLIC PANEL, COMPREHENSIVE; Future  -     CBC WITH AUTOMATED DIFF; Future    Other orders  -     varicella-zoster recombinant, PF, (Shingrix, PF,) 50 mcg/0.5 mL susr injection; 0.5mL by IntraMUSCular route once now and then repeat in 2-6 months      Medicare Annual Wellness Visit: See attached note. I referred pt to Dr. Misty Sanchez. I ordered Shingrix. Screening for Ischemic Heart Disease: Recheck lipid panel. Special screening for malignant neoplasm of prostate: I ordered a PSA screening. Screen for colon cancer: I referred pt to Dr. Babak Mcintyre. Personal history of tobacco use, presenting hazards to health: I ordered a low dose CT cancer screening. Hypertension: BP seems to be well controlled. I recommended continuing current dose of medications, eating a low sodium diet, and increasing exercise. Recheck CMP and CBC. ICD-10-CM ICD-9-CM    1. Medicare annual wellness visit, subsequent  Z00.00 V70.0 REFERRAL TO GASTROENTEROLOGY      REFERRAL TO UROLOGY   2. Screening for ischemic heart disease  Z13.6 V81.0 LIPID PANEL      LIPID PANEL   3.  Special screening for malignant neoplasm of prostate  Z12.5 V76.44 PSA SCREENING (SCREENING)   4. Screen for colon cancer  Z12.11 V76.51 REFERRAL TO GASTROENTEROLOGY   5. Personal history of tobacco use, presenting hazards to health  Z87.891 V15.82 CT LOW DOSE LUNG CANCER SCREENING   6. Essential hypertension  M01 811.9 METABOLIC PANEL, COMPREHENSIVE      CBC WITH AUTOMATED DIFF      CBC WITH AUTOMATED DIFF      METABOLIC PANEL, COMPREHENSIVE        Follow-up and Dispositions    · Return in about 6 months (around 12/29/2022), or if symptoms worsen or fail to improve. I have reviewed the patient's medications and risks/side effects/benefits were discussed. Diagnosis(-es) explained to patient and questions answered. Literature provided where appropriate.      Written by Lea Segovia, as dictated by Beryle Led, MD.

## 2022-06-29 NOTE — PROGRESS NOTES
1. \"Have you been to the ER, urgent care clinic since your last visit? Hospitalized since your last visit? \" No    2. \"Have you seen or consulted any other health care providers outside of the [address] [place] [address] since your last visit? \" No     3. For patients aged 39-70: Has the patient had a colonoscopy / FIT/ Cologuard? NA - based on age      If the patient is female:    4. For patients aged 41-77: Has the patient had a mammogram within the past 2 years? NA - based on age or sex      11. For patients aged 21-65: Has the patient had a pap smear?  NA - based on age or sex

## 2022-06-30 LAB
ALBUMIN SERPL-MCNC: 4 G/DL (ref 3.5–5)
ALBUMIN/GLOB SERPL: 1.3 {RATIO} (ref 1.1–2.2)
ALP SERPL-CCNC: 81 U/L (ref 45–117)
ALT SERPL-CCNC: 16 U/L (ref 12–78)
ANION GAP SERPL CALC-SCNC: 6 MMOL/L (ref 5–15)
AST SERPL-CCNC: 10 U/L (ref 15–37)
BASOPHILS # BLD: 0.1 K/UL (ref 0–0.1)
BASOPHILS NFR BLD: 1 % (ref 0–1)
BILIRUB SERPL-MCNC: 0.4 MG/DL (ref 0.2–1)
BUN SERPL-MCNC: 18 MG/DL (ref 6–20)
BUN/CREAT SERPL: 17 (ref 12–20)
CALCIUM SERPL-MCNC: 9.4 MG/DL (ref 8.5–10.1)
CHLORIDE SERPL-SCNC: 111 MMOL/L (ref 97–108)
CHOLEST SERPL-MCNC: 224 MG/DL
CO2 SERPL-SCNC: 27 MMOL/L (ref 21–32)
CREAT SERPL-MCNC: 1.09 MG/DL (ref 0.7–1.3)
DIFFERENTIAL METHOD BLD: ABNORMAL
EOSINOPHIL # BLD: 0.3 K/UL (ref 0–0.4)
EOSINOPHIL NFR BLD: 5 % (ref 0–7)
ERYTHROCYTE [DISTWIDTH] IN BLOOD BY AUTOMATED COUNT: 12.5 % (ref 11.5–14.5)
GLOBULIN SER CALC-MCNC: 3.1 G/DL (ref 2–4)
GLUCOSE SERPL-MCNC: 101 MG/DL (ref 65–100)
HCT VFR BLD AUTO: 40.2 % (ref 36.6–50.3)
HDLC SERPL-MCNC: 56 MG/DL
HDLC SERPL: 4 {RATIO} (ref 0–5)
HGB BLD-MCNC: 12.6 G/DL (ref 12.1–17)
IMM GRANULOCYTES # BLD AUTO: 0 K/UL (ref 0–0.04)
IMM GRANULOCYTES NFR BLD AUTO: 0 % (ref 0–0.5)
LDLC SERPL CALC-MCNC: 138.8 MG/DL (ref 0–100)
LYMPHOCYTES # BLD: 1.8 K/UL (ref 0.8–3.5)
LYMPHOCYTES NFR BLD: 33 % (ref 12–49)
MCH RBC QN AUTO: 32.2 PG (ref 26–34)
MCHC RBC AUTO-ENTMCNC: 31.3 G/DL (ref 30–36.5)
MCV RBC AUTO: 102.8 FL (ref 80–99)
MONOCYTES # BLD: 0.6 K/UL (ref 0–1)
MONOCYTES NFR BLD: 11 % (ref 5–13)
NEUTS SEG # BLD: 2.9 K/UL (ref 1.8–8)
NEUTS SEG NFR BLD: 50 % (ref 32–75)
NRBC # BLD: 0 K/UL (ref 0–0.01)
NRBC BLD-RTO: 0 PER 100 WBC
PLATELET # BLD AUTO: 228 K/UL (ref 150–400)
PMV BLD AUTO: 11.8 FL (ref 8.9–12.9)
POTASSIUM SERPL-SCNC: 4.9 MMOL/L (ref 3.5–5.1)
PROT SERPL-MCNC: 7.1 G/DL (ref 6.4–8.2)
RBC # BLD AUTO: 3.91 M/UL (ref 4.1–5.7)
SODIUM SERPL-SCNC: 144 MMOL/L (ref 136–145)
TRIGL SERPL-MCNC: 146 MG/DL (ref ?–150)
VLDLC SERPL CALC-MCNC: 29.2 MG/DL
WBC # BLD AUTO: 5.6 K/UL (ref 4.1–11.1)

## 2022-07-05 NOTE — PROGRESS NOTES
Lipid panel: Stable  Your current lab results reveal a elevated total cholesterol and ldl. Your total cholesterol should be under 200 and your ldl under 100. Work on following a low fat diet and exercise at least three times a week. CBC: No significant anemia noted on CBC. There are normal red and white cells levels. Mild elevation in MCV can be associated with B12 deficiency. Please place an order for B12 and folate level. CMP:Normal electrolyte levels, renal, and liver function.

## 2022-07-12 DIAGNOSIS — Z12.11 SCREEN FOR COLON CANCER: Primary | ICD-10-CM

## 2022-07-12 DIAGNOSIS — Z00.00 MEDICARE ANNUAL WELLNESS VISIT, SUBSEQUENT: Primary | ICD-10-CM

## 2022-07-20 ENCOUNTER — HOSPITAL ENCOUNTER (OUTPATIENT)
Dept: CT IMAGING | Age: 72
Discharge: HOME | End: 2022-07-20
Attending: FAMILY MEDICINE
Payer: MEDICARE

## 2022-07-20 DIAGNOSIS — Z87.891 PERSONAL HISTORY OF TOBACCO USE, PRESENTING HAZARDS TO HEALTH: ICD-10-CM

## 2022-07-20 PROCEDURE — 71271 CT THORAX LUNG CANCER SCR C-: CPT

## 2022-07-21 NOTE — PROGRESS NOTES
Please inform this patient that his chest CT did not reveal any nodules or acute abnormalities with his lung. He will be due for repeat in 1 year. Incidental findings are arthritis was seen in his cervical spine. Please send this patient a copy of the report since he does not have MyChart.

## 2022-07-21 NOTE — PROGRESS NOTES
Pt called in. Two pt identifiers confirmed. Pt informed of CT results and recommendations. Pt stated he would like imaging sent to his ortho doctor.   -Imaging sent electronically to Dr. Joseph Brady-  Pt stated she will be in this week or next week to get the B12 & folate lab done as well. Pt given hours for our lab in office. Pt verbalized understanding of information discussed w/ no further questions at this time.

## 2022-07-22 ENCOUNTER — LAB ONLY (OUTPATIENT)
Dept: INTERNAL MEDICINE CLINIC | Age: 72
End: 2022-07-22

## 2022-07-22 DIAGNOSIS — Z00.00 MEDICARE ANNUAL WELLNESS VISIT, SUBSEQUENT: ICD-10-CM

## 2022-07-22 LAB
FOLATE SERPL-MCNC: 11.9 NG/ML (ref 5–21)
VIT B12 SERPL-MCNC: 756 PG/ML (ref 193–986)

## 2022-09-13 RX ORDER — AMLODIPINE BESYLATE 10 MG/1
TABLET ORAL
Qty: 90 TABLET | Refills: 1 | Status: SHIPPED | OUTPATIENT
Start: 2022-09-13

## 2022-10-13 ENCOUNTER — TELEPHONE (OUTPATIENT)
Dept: INTERNAL MEDICINE CLINIC | Age: 72
End: 2022-10-13

## 2022-10-13 NOTE — TELEPHONE ENCOUNTER
Called, spoke with Pt. Two pt identifiers confirmed.       Patient wants to know if he needs to hold any of his medications prior to colonoscopy

## 2022-10-13 NOTE — TELEPHONE ENCOUNTER
#[phone]  pt states he has a colonoscopy on 10-18-22 and needs directions as to how to take medications up to procedure. Pt would like to know by at least tomorrow, 10-14-22 please.

## 2022-10-17 ENCOUNTER — ANESTHESIA EVENT (OUTPATIENT)
Dept: ENDOSCOPY | Age: 72
End: 2022-10-17
Payer: MEDICARE

## 2022-10-17 RX ORDER — DEXTROMETHORPHAN HYDROBROMIDE, GUAIFENESIN 5; 100 MG/5ML; MG/5ML
650 LIQUID ORAL AS NEEDED
COMMUNITY

## 2022-10-17 RX ORDER — TAMSULOSIN HYDROCHLORIDE 0.4 MG/1
0.4 CAPSULE ORAL
COMMUNITY

## 2022-10-17 NOTE — TELEPHONE ENCOUNTER
Patient states he has Colonoscopy tomorrow, 10/18/2 & needs to discuss what to do about his medications Prior to Procedure. Please call Asap. Patient states a detailed message can be left on Voice Mail if no answer. Thank you

## 2022-10-17 NOTE — TELEPHONE ENCOUNTER
Michelle Owens MD  You Just now (11:21 AM)     RL  He is not taking any blood thinners. He can hold asprin if taking although it is not in his list.    Called, spoke with Pt. Two pt identifiers confirmed. Patient updated.

## 2022-10-18 ENCOUNTER — HOSPITAL ENCOUNTER (OUTPATIENT)
Age: 72
Setting detail: OUTPATIENT SURGERY
Discharge: HOME | End: 2022-10-18
Attending: INTERNAL MEDICINE | Admitting: INTERNAL MEDICINE
Payer: MEDICARE

## 2022-10-18 ENCOUNTER — ANESTHESIA (OUTPATIENT)
Dept: ENDOSCOPY | Age: 72
End: 2022-10-18
Payer: MEDICARE

## 2022-10-18 VITALS
RESPIRATION RATE: 22 BRPM | DIASTOLIC BLOOD PRESSURE: 54 MMHG | HEART RATE: 59 BPM | BODY MASS INDEX: 26.02 KG/M2 | WEIGHT: 185.9 LBS | TEMPERATURE: 97.8 F | SYSTOLIC BLOOD PRESSURE: 118 MMHG | HEIGHT: 71 IN | OXYGEN SATURATION: 99 %

## 2022-10-18 PROCEDURE — 88305 TISSUE EXAM BY PATHOLOGIST: CPT

## 2022-10-18 PROCEDURE — 74011250636 HC RX REV CODE- 250/636: Performed by: INTERNAL MEDICINE

## 2022-10-18 PROCEDURE — 2709999900 HC NON-CHARGEABLE SUPPLY: Performed by: INTERNAL MEDICINE

## 2022-10-18 PROCEDURE — 74011000250 HC RX REV CODE- 250: Performed by: ANESTHESIOLOGY

## 2022-10-18 PROCEDURE — 77030013992 HC SNR POLYP ENDOSC BSC -B: Performed by: INTERNAL MEDICINE

## 2022-10-18 PROCEDURE — 74011250636 HC RX REV CODE- 250/636: Performed by: ANESTHESIOLOGY

## 2022-10-18 PROCEDURE — 76040000019: Performed by: INTERNAL MEDICINE

## 2022-10-18 PROCEDURE — 76060000031 HC ANESTHESIA FIRST 0.5 HR: Performed by: INTERNAL MEDICINE

## 2022-10-18 RX ORDER — LIDOCAINE HYDROCHLORIDE 20 MG/ML
INJECTION, SOLUTION EPIDURAL; INFILTRATION; INTRACAUDAL; PERINEURAL AS NEEDED
Status: DISCONTINUED | OUTPATIENT
Start: 2022-10-18 | End: 2022-10-18 | Stop reason: HOSPADM

## 2022-10-18 RX ORDER — DIPHENHYDRAMINE HYDROCHLORIDE 50 MG/ML
50 INJECTION, SOLUTION INTRAMUSCULAR; INTRAVENOUS ONCE
Status: DISCONTINUED | OUTPATIENT
Start: 2022-10-18 | End: 2022-10-18 | Stop reason: HOSPADM

## 2022-10-18 RX ORDER — EPINEPHRINE 0.1 MG/ML
1 INJECTION INTRACARDIAC; INTRAVENOUS
Status: DISCONTINUED | OUTPATIENT
Start: 2022-10-18 | End: 2022-10-18 | Stop reason: HOSPADM

## 2022-10-18 RX ORDER — FLUMAZENIL 0.1 MG/ML
0.2 INJECTION INTRAVENOUS
Status: DISCONTINUED | OUTPATIENT
Start: 2022-10-18 | End: 2022-10-18 | Stop reason: HOSPADM

## 2022-10-18 RX ORDER — PROPOFOL 10 MG/ML
INJECTION, EMULSION INTRAVENOUS AS NEEDED
Status: DISCONTINUED | OUTPATIENT
Start: 2022-10-18 | End: 2022-10-18 | Stop reason: HOSPADM

## 2022-10-18 RX ORDER — NALOXONE HYDROCHLORIDE 0.4 MG/ML
0.4 INJECTION, SOLUTION INTRAMUSCULAR; INTRAVENOUS; SUBCUTANEOUS
Status: DISCONTINUED | OUTPATIENT
Start: 2022-10-18 | End: 2022-10-18 | Stop reason: HOSPADM

## 2022-10-18 RX ORDER — DEXTROMETHORPHAN/PSEUDOEPHED 2.5-7.5/.8
1.2 DROPS ORAL
Status: DISCONTINUED | OUTPATIENT
Start: 2022-10-18 | End: 2022-10-18 | Stop reason: HOSPADM

## 2022-10-18 RX ORDER — MIDAZOLAM HYDROCHLORIDE 1 MG/ML
.25-5 INJECTION, SOLUTION INTRAMUSCULAR; INTRAVENOUS
Status: DISCONTINUED | OUTPATIENT
Start: 2022-10-18 | End: 2022-10-18 | Stop reason: HOSPADM

## 2022-10-18 RX ORDER — SODIUM CHLORIDE 9 MG/ML
125 INJECTION, SOLUTION INTRAVENOUS CONTINUOUS
Status: DISCONTINUED | OUTPATIENT
Start: 2022-10-18 | End: 2022-10-18 | Stop reason: HOSPADM

## 2022-10-18 RX ORDER — ATROPINE SULFATE 0.1 MG/ML
0.5 INJECTION INTRAVENOUS
Status: DISCONTINUED | OUTPATIENT
Start: 2022-10-18 | End: 2022-10-18 | Stop reason: HOSPADM

## 2022-10-18 RX ORDER — SODIUM CHLORIDE 9 MG/ML
25 INJECTION, SOLUTION INTRAVENOUS CONTINUOUS
Status: DISCONTINUED | OUTPATIENT
Start: 2022-10-18 | End: 2022-10-18 | Stop reason: HOSPADM

## 2022-10-18 RX ADMIN — SODIUM CHLORIDE 25 ML/HR: 9 INJECTION, SOLUTION INTRAVENOUS at 08:48

## 2022-10-18 RX ADMIN — LIDOCAINE HYDROCHLORIDE 40 MG: 20 INJECTION, SOLUTION EPIDURAL; INFILTRATION; INTRACAUDAL; PERINEURAL at 09:25

## 2022-10-18 RX ADMIN — PROPOFOL 150 MG: 10 INJECTION, EMULSION INTRAVENOUS at 09:37

## 2022-10-18 NOTE — H&P
[facility]  Outpatient History and Physical    Patient: Jordan Underwood    Physician: Mel Jefferson MD    Vital Signs: Blood pressure (!) 166/55, pulse 64, temperature 98 °F (36.7 °C), resp. rate 17, height 5' 11\" (1.803 m), weight 84.3 kg (185 lb 14.4 oz), SpO2 100 %. Allergies:   No Known Allergies    Chief Complaint: screening colonoscopy    History of Present Illness: Mr. Vidhya Andujar is a 64yo here for screening colonoscopy      History:  Past Medical History:   Diagnosis Date    Arthritis     DJD (degenerative joint disease) of hip     Hypertension     Urinary incontinence       Past Surgical History:   Procedure Laterality Date    HX ORTHOPAEDIC Bilateral 2004    THR      Social History     Socioeconomic History    Marital status: SINGLE   Tobacco Use    Smoking status: Former     Packs/day: 1.00     Years: 20.00     Pack years: 20.00     Types: Cigarettes     Quit date: 10/1/2017     Years since quittin.0    Smokeless tobacco: Never    Tobacco comments:     doesn't inhale   Vaping Use    Vaping Use: Never used   Substance and Sexual Activity    Alcohol use: Yes     Comment: occasionally cachorro    Drug use: No    Sexual activity: Not Currently      Family History   Problem Relation Age of Onset    Hypertension Mother     Coronary Art Dis Maternal Grandmother     Coronary Art Dis Maternal Grandfather     Hypertension Sister        Medications:   Prior to Admission medications    Medication Sig Start Date End Date Taking? Authorizing Provider   acetaminophen (TYLENOL) 650 mg TbER Take 650 mg by mouth as needed for Pain. Yes Provider, Historical   tamsulosin (FLOMAX) 0.4 mg capsule Take 0.4 mg by mouth nightly. Yes Provider, Historical   amLODIPine (NORVASC) 10 mg tablet TAKE 1 TABLET BY MOUTH EVERY DAY 22  Yes Martin Conrad MD   metoprolol succinate (TOPROL-XL) 50 mg XL tablet Take 1 Tablet by mouth daily.  22  Yes Martin Conrad MD   lisinopriL (PRINIVIL, ZESTRIL) 20 mg tablet TAKE 1 AND 1/2 TABLETS BY MOUTH DAILY 6/12/22  Yes Diana Roe MD   latanoprost (XALATAN) 0.005 % ophthalmic solution Administer 1 Drop to both eyes nightly. Yes Provider, Historical   varicella-zoster recombinant, PF, (Shingrix, PF,) 50 mcg/0.5 mL susr injection 0.5mL by IntraMUSCular route once now and then repeat in 2-6 months 6/29/22   Diana Roe MD       Physical Exam:   General: alert, no distress   HEENT: Head: Normocephalic, no lesions, without obvious abnormality. Heart: regular rate and rhythm, S1, S2 normal, no murmur, click, rub or gallop   Lungs: chest clear, no wheezing, rales, normal symmetric air entry   Abdominal: Bowel sounds are normal, liver is not enlarged, spleen is not enlarged   Neurological: Grossly normal   Extremities: extremities normal, atraumatic, no cyanosis or edema     Plan of Care/Planned Procedure: colonoscopy    The heart, lungs and mental status were satisfactory for the administration of MAC sedation and for the procedure. Informed consent was obtained for the procedure, including sedation. Risks of perforation, hemorrhage, adverse drug reaction, and aspiration were discussed. The risks, benefits and alternatives were again reiterated to the patient to include the risk of infection, bleeding, medication reaction, aspiration, perforation which could require immediate surgery, cardiopulmonary complication, issues with anesthesia and death. The patient was counseled at length about the risks of bob Covid-19 in the trena-operative and post-operative states including the recovery window of their procedure. The patient was made aware that bob Covid-19 after a surgical procedure may worsen their prognosis for recovering from the virus and lend to a higher morbidity and or mortality risk. The patient was given the options of postponing their procedure. All of the risks, benefits, and alternatives were discussed. The patient does  wish to proceed with the procedure.

## 2022-10-18 NOTE — ANESTHESIA PREPROCEDURE EVALUATION
Anesthetic History   No history of anesthetic complications            Review of Systems / Medical History  Patient summary reviewed, nursing notes reviewed and pertinent labs reviewed    Pulmonary  Within defined limits        Smoker      Comments: Former 20 pack yr smoker, quit in 2017   Neuro/Psych   Within defined limits           Cardiovascular    Hypertension: well controlled      CHF    Hyperlipidemia    Exercise tolerance: >4 METS  Comments: Nonischemic cardiomyopathy EF about 40-45%    Negative stress ECHO in 2019    Took his BB this AM   GI/Hepatic/Renal  Within defined limits              Endo/Other        Arthritis     Other Findings   Comments: Screening colonoscopy    S/P bilateral THR    BPH         Physical Exam    Airway  Mallampati: II  TM Distance: 4 - 6 cm  Neck ROM: normal range of motion   Mouth opening: Normal     Cardiovascular  Regular rate and rhythm,  S1 and S2 normal,  no murmur, click, rub, or gallop  Rhythm: regular  Rate: normal         Dental    Dentition: Upper partial plate, Lower partial plate and Poor dentition     Pulmonary  Breath sounds clear to auscultation               Abdominal  GI exam deferred       Other Findings            Anesthetic Plan    ASA: 3  Anesthesia type: general and total IV anesthesia          Induction: Intravenous  Anesthetic plan and risks discussed with: Patient

## 2022-10-18 NOTE — ROUTINE PROCESS
TRANSFER - IN REPORT:    Verbal report received from Minerva(name) on Keren Hecklar  being received from endo(unit) for routine progression of care      Report consisted of patients Situation, Background, Assessment and   Recommendations(SBAR). Information from the following report(s) SBAR, Procedure Summary, and MAR was reviewed with the receiving nurse. Opportunity for questions and clarification was provided. Assessment completed upon patients arrival to unit and care assumed.

## 2022-10-18 NOTE — PROCEDURES
Colonoscopy Procedure Note    Indications:   See Preoperative Diagnosis above  Referring Physician: Nga Chatterjee MD  Anesthesia/Sedation: MAC anesthesia Propofol  Endoscopist:  Dr. Horace Obando  Assistant:  Endoscopy Technician-1: Javi Mccabe  Endoscopy RN-1: Carlos Corey RN  Preoperative diagnosis: SCREENING  Postoperative diagnosis: Colon Polyp, Hemorrhoids    Procedure in Detail:  Informed consent was obtained for the procedure, including sedation. Risks of perforation, hemorrhage, adverse drug reaction, and aspiration were discussed. The patient was placed in the left lateral decubitus position. Based on the pre-procedure assessment, including review of the patient's medical history, medications, allergies, and review of systems, he had been deemed to be an appropriate candidate for moderate sedation; he was therefore sedated with the medications listed above. The patient was monitored continuously with ECG tracing, pulse oximetry, blood pressure monitoring, and direct observations. A rectal examination was performed. The [id number] was inserted into the rectum and advanced under direct vision to the terminal ileum. The quality of the colonic preparation was excellent BPS 9. A careful inspection was made as the colonoscope was withdrawn, including a retroflexed view of the rectum; findings and interventions are described below. Appropriate photodocumentation was obtained.     Findings:  DM: unremarkable  Rectum: small hemorrhoids on retroflexion  Sigmoid: normal  no mucosal lesion appreciated  Descending Colon: normal  no mucosal lesion appreciated  Transverse Colon: normal  no mucosal lesion appreciated  Ascending Colon: one 4mm sessile polyp removed with cold snare polypectomy, retrieved, minimal bleeding  Cecum: normal  no mucosal lesion appreciated  Terminal Ileum: normal  no mucosal lesion appreciated    EBL: minimal    Complications: None; patient tolerated the procedure well. Recommendations:     - Await pathology.   - Repeat colonoscopy based on path  - discussed w/ [place]      Signed By: Dayana Sanchez MD                        October 18, 2022

## 2022-10-18 NOTE — DISCHARGE INSTRUCTIONS
[facility]  [id number]  1950    It was my pleasure seeing you for your procedure. You will also receive a summary report with the findings from this procedure and any further recommendations. If you had polyps removed or biopsies taken during your procedure, you will receive a separate letter from me within the next 2 weeks. If you don't receive this letter or if you have any questions, please call my office [phone]. Please take note of the post procedure instructions listed below. Best Wishes,    Dr. Abraham Montano    These instructions give you information on caring for yourself after your procedure. Call your doctor if you have any problems or questions after your procedure. HOME CARE  Walk if you have belly cramping or gas. Walking will help get rid of the air and reduce the bloated feeling in your belly (abdomen). Your IV site (where you received drugs) may be tender to touch. Place warm towels on the site; keep your arm up on two pillows if you have any swelling or soreness in the area. You may shower. ACTIVITY:  Take frequent rest periods and move at a slower pace for the next 24 hours. .  You may resume your regular activity tomorrow if you are feeling back to normal.  Do not drive or ride a bicycle for at least 24 hours (because of the medicine (anesthesia) used during the test). Do not sign any important legal documents or use or operate any machinery for 24 hours  Do not take sleeping medicines/nerve drugs for 24 hours unless the doctor tells you. You can return to work/school tomorrow unless otherwise instructed. NUTRITION:  Drink plenty of fluids to keep your pee (urine) clear or pale yellow  Begin with a light meal and progress to your normal diet. Heavy or fried foods are harder to digest and may make you feel sick to your stomach (nauseated).   Once you are feeling back to normal, you may resume your normal diet as instructed by your doctor. Avoid alcoholic beverages for 24 hours or as instructed. IF YOU HAD BIOPSIES TAKEN OR POLYPS REMOVED DURING THE PROCEDURE:  For the next 7 days, avoid all non-steroidal antiinflammatory medications such as Ibuprofen, Motrin, Advil, Alleve, Rekha-seltzer, Goody's powder, BC powder. If you do not have an heart condition that requires you to take a daily aspirin, you should avoid taking aspirin for 7 days. Eat a soft diet for 24 hours. Monitor your stools for any blood or dark black, tar-like, stools as this may be a sign of bleeding and if you see any blood, notify your doctor immediately. GET HELP RIGHT AWAY AND SEEK IMMEDIATE MEDICAL CARE IF:  You have more than a spotting of blood in your stool. You pass clumps of tissue (blood clots) or fill the toilet with blood. Your belly is painfully swollen or puffy (abdominal distention). You throw up (vomit). You have a fever. You have redness, pain or swelling at the IV site that last greater than two days. You have abdominal pain or discomfort that is severe or gets worse throughout the day. Post-procedure diagnosis:  Colon Polyp, Hemorrhoids    Post-procedure recommendations:          Learning About Coronavirus (COVID-19)  Coronavirus (COVID-19): Overview  What is coronavirus (COVID-19)? The coronavirus disease (COVID-19) is caused by a virus. It is an illness that was first found in [place], [place], in December 2019. It has since spread worldwide. The virus can cause fever, cough, and trouble breathing. In severe cases, it can cause pneumonia and make it hard to breathe without help. It can cause death. Coronaviruses are a large group of viruses. They cause the common cold. They also cause more serious illnesses like Middle East respiratory syndrome (MERS) and severe acute respiratory syndrome (SARS). COVID-19 is caused by a novel coronavirus. That means it's a new type that has not been seen in people before.   This virus spreads person-to-person through droplets from coughing and sneezing. It can also spread when you are close to someone who is infected. And it can spread when you touch something that has the virus on it, such as a doorknob or a tabletop. What can you do to protect yourself from coronavirus (COVID-19)? The best way to protect yourself from getting sick is to: Avoid areas where there is an outbreak. Avoid contact with people who may be infected. Wash your hands often with soap or alcohol-based hand sanitizers. Avoid crowds and try to stay at least 6 feet away from other people. Wash your hands often, especially after you cough or sneeze. Use soap and water, and scrub for at least 20 seconds. If soap and water aren't available, use an alcohol-based hand . Avoid touching your mouth, nose, and eyes. What can you do to avoid spreading the virus to others? To help avoid spreading the virus to others:  Cover your mouth with a tissue when you cough or sneeze. Then throw the tissue in the trash. Use a disinfectant to clean things that you touch often. Stay home if you are sick or have been exposed to the virus. Don't go to school, work, or public areas. And don't use public transportation. If you are sick:  Leave your home only if you need to get medical care. But call the doctor's office first so they know you're coming. And wear a face mask, if you have one. If you have a face mask, wear it whenever you're around other people. It can help stop the spread of the virus when you cough or sneeze. Clean and disinfect your home every day. Use household  and disinfectant wipes or sprays. Take special care to clean things that you grab with your hands. These include doorknobs, remote controls, phones, and handles on your refrigerator and microwave. And don't forget countertops, tabletops, bathrooms, and computer keyboards.   When to call for help  Call 911 anytime you think you may need emergency care. For example, call if:  You have severe trouble breathing. (You can't talk at all.)  You have constant chest pain or pressure. You are severely dizzy or lightheaded. You are confused or can't think clearly. Your face and lips have a blue color. You pass out (lose consciousness) or are very hard to wake up. Call your doctor now if you develop symptoms such as:  Shortness of breath. Fever. Cough. If you need to get care, call ahead to the doctor's office for instructions before you go. Make sure you wear a face mask, if you have one, to prevent exposing other people to the virus. Where can you get the latest information? The following health organizations are tracking and studying this virus. Their websites contain the most up-to-date information. Kimberli Gins also learn what to do if you think you may have been exposed to the virus. U.S. Centers for Disease Control and Prevention (CDC): The CDC provides updated news about the disease and travel advice. The website also tells you how to prevent the spread of infection. www.cdc.gov  World Health Organization [facility]): WHO offers information about the virus outbreaks. WHO also has travel advice. www.who.int  Current as of: April 1, 2020               Content Version: 12.4  © 2006-2020 [employer]. Care instructions adapted under license by your healthcare professional. If you have questions about a medical condition or this instruction, always ask your healthcare professional. [place] [address] any warranty or liability for your use of this information.              Patient Education on Sedation / Analgesia Administered for Procedure      For 24 hours after general anesthesia or intravenous analgesia / sedation:  Have someone responsible help you with your care  Limit your activities  Do not drive and operate hazardous machinery  Do not make important personal, legal or business decisions  Do not drink alcoholic beverages  If you have not urinated within 8 hours after discharge, please contact your physician  Resume your medications unless otherwise instructed    For 24 hours after general anesthesia or intravenous analgesia / sedation  you may experience:  Drowsiness, dizziness, sleepiness, or confusion  Difficulty remembering or delayed reaction times  Difficulty with your balance, especially while walking, move slowly and carefully, do not make sudden position changes  Difficulty focusing or blurred vision    You may not be aware of slight changes in your behavior and/or your reaction time because of the medication used during and after your procedure.     Report the following to your physician:  Excessive pain, swelling, redness or odor of or around the surgical area  Temperature over 100.5  Nausea and vomiting lasting longer than 4 hours or if unable to take medications  Any signs of decreased circulation or nerve impairment to extremity: change in color, persistent numbness, tingling, coldness or increase pain  Any questions or concerns    IF YOU REPORT TO AN EMERGENCY ROOM, DOCTOR'S OFFICE OR HOSPITAL WITHIN 24 HOURS AFTER YOUR PROCEDURE, BRING THIS SHEET AND YOUR AFTER VISIT SUMMARY WITH YOU AND GIVE IT TO THE PHYSICIAN OR NURSE ATTENDING YOU.

## 2022-10-18 NOTE — PERIOP NOTES
Endoscopy Case End Note:    9747:  Procedure scope was pre-cleaned, per protocol, at bedside by Dhruv Byrne. 5673:  Report received from anesthesia - Dr. Amy Gallegos DO. See anesthesia flowsheet for intra-procedure vital signs and events.

## 2022-10-18 NOTE — ANESTHESIA POSTPROCEDURE EVALUATION
Procedure(s):  COLONOSCOPY  ENDOSCOPIC POLYPECTOMY. total IV anesthesia    Anesthesia Post Evaluation        Patient location during evaluation: PACU  Note status: Adequate. Level of consciousness: responsive to verbal stimuli and sleepy but conscious  Pain management: satisfactory to patient  Airway patency: patent  Anesthetic complications: no  Cardiovascular status: acceptable  Respiratory status: acceptable  Hydration status: acceptable  Comments: +Post-Anesthesia Evaluation and Assessment    Patient: Jordan Underwood MRN: [id number]  SSN: [id number]   YOB: 1950  Age: 74 y.o. Sex: male      Cardiovascular Function/Vital Signs    BP (!) 102/50   Pulse (!) 370   Temp 36.7 °C (98 °F)   Resp 16   Ht 5' 11\" (1.803 m)   Wt 84.3 kg (185 lb 14.4 oz)   SpO2 98%   BMI 25.93 kg/m²     Patient is status post Procedure(s):  COLONOSCOPY  ENDOSCOPIC POLYPECTOMY. Nausea/Vomiting: Controlled. Postoperative hydration reviewed and adequate. Pain:  Pain Scale 1: Numeric (0 - 10) (10/18/22 0941)  Pain Intensity 1: 0 (10/18/22 0941)   Managed. Neurological Status: At baseline. Mental Status and Level of Consciousness: Arousable. Pulmonary Status:   O2 Device: None (Room air) (10/18/22 0941)   Adequate oxygenation and airway patent. Complications related to anesthesia: None    Post-anesthesia assessment completed. No concerns. Signed By: Ginger Jimenez MD    10/18/2022  Post anesthesia nausea and vomiting:  controlled      INITIAL Post-op Vital signs:   Vitals Value Taken Time   /88 10/18/22 0946   Temp     Pulse 64 10/18/22 0950   Resp 14 10/18/22 0950   SpO2 100 % 10/18/22 0950   Vitals shown include unvalidated device data.

## 2022-12-09 DIAGNOSIS — I10 ESSENTIAL HYPERTENSION: ICD-10-CM

## 2022-12-09 RX ORDER — METOPROLOL SUCCINATE 50 MG/1
50 TABLET, EXTENDED RELEASE ORAL DAILY
Qty: 90 TABLET | Refills: 1 | Status: SHIPPED | OUTPATIENT
Start: 2022-12-09

## 2022-12-09 RX ORDER — LISINOPRIL 20 MG/1
TABLET ORAL
Qty: 135 TABLET | Refills: 1 | Status: SHIPPED | OUTPATIENT
Start: 2022-12-09

## 2023-01-09 ENCOUNTER — OFFICE VISIT (OUTPATIENT)
Dept: INTERNAL MEDICINE CLINIC | Age: 73
End: 2023-01-09
Payer: MEDICARE

## 2023-01-09 VITALS
WEIGHT: 192 LBS | HEART RATE: 73 BPM | RESPIRATION RATE: 16 BRPM | DIASTOLIC BLOOD PRESSURE: 90 MMHG | HEIGHT: 71 IN | TEMPERATURE: 98.6 F | OXYGEN SATURATION: 99 % | BODY MASS INDEX: 26.88 KG/M2 | SYSTOLIC BLOOD PRESSURE: 140 MMHG

## 2023-01-09 DIAGNOSIS — I50.22 SYSTOLIC CHF, CHRONIC (HCC): ICD-10-CM

## 2023-01-09 DIAGNOSIS — I10 PRIMARY HYPERTENSION: Primary | ICD-10-CM

## 2023-01-09 DIAGNOSIS — N40.0 BENIGN PROSTATIC HYPERPLASIA WITHOUT LOWER URINARY TRACT SYMPTOMS: ICD-10-CM

## 2023-01-09 DIAGNOSIS — R22.1 NECK MASS: ICD-10-CM

## 2023-01-09 DIAGNOSIS — Z12.5 ENCOUNTER FOR SCREENING FOR MALIGNANT NEOPLASM OF PROSTATE: ICD-10-CM

## 2023-01-09 DIAGNOSIS — E78.2 MIXED HYPERLIPIDEMIA: ICD-10-CM

## 2023-01-09 PROCEDURE — G8536 NO DOC ELDER MAL SCRN: HCPCS | Performed by: FAMILY MEDICINE

## 2023-01-09 PROCEDURE — 99214 OFFICE O/P EST MOD 30 MIN: CPT | Performed by: FAMILY MEDICINE

## 2023-01-09 PROCEDURE — 1101F PT FALLS ASSESS-DOCD LE1/YR: CPT | Performed by: FAMILY MEDICINE

## 2023-01-09 PROCEDURE — G8417 CALC BMI ABV UP PARAM F/U: HCPCS | Performed by: FAMILY MEDICINE

## 2023-01-09 PROCEDURE — 3017F COLORECTAL CA SCREEN DOC REV: CPT | Performed by: FAMILY MEDICINE

## 2023-01-09 PROCEDURE — G8432 DEP SCR NOT DOC, RNG: HCPCS | Performed by: FAMILY MEDICINE

## 2023-01-09 PROCEDURE — G8427 DOCREV CUR MEDS BY ELIG CLIN: HCPCS | Performed by: FAMILY MEDICINE

## 2023-01-09 NOTE — PROGRESS NOTES
SUBJECTIVE:   Mr. Karina Davalos is a 74 y.o. male who is here for follow up of routine medical issues. HTN: Pt is compliant in taking lisinopril 30 mg, Toprol-XL 50 mg, amlodipine 10 mg. Pt's BP in the office today was elevated at 142/63. He denies checking his BP at home. Patient denies chest pain, LOWE/SOB, edema, headache, visual changes, dizziness, palpitations or syncope. Neck Mass: He reports intermittent swelling in his neck. He notes relief when applying heat. Pt has previously had an x-ray of his cervical spine, which showed cervical degenerative disc disease. He had a colonoscopy done on 10/18/2022 with . A 4mm sessile polyp was removed with cold snare polypectomy. Pt states that he was told to repeat the colonoscopy in 7 years. Pt was prescribed tamsulosin and tolterodine by . Pt specifically denies changes in vision or hearing, trouble with swallowing or taste, CP, SOB, heartburn or upset stomach, change in bowel habits, problems urinating, unusual joint or muscle pains, numbness or tingling in extremities, or skin lesions of concern. PREVENTIVE:  Colonoscopy: 10/18/2022 (repeat in 7 years)  PSA: 1/27/2021  Flu: UTD  AAA: 10/29/2019  Shingrix: first dose 1/9/2023  Pneumonia: UTD  COVID: 4 doses    At this time, he is otherwise doing well and has brought no other complaints to my attention today. For a list of the medical issues addressed today, see the assessment and plan below. PMH:   Past Medical History:   Diagnosis Date    Arthritis     DJD (degenerative joint disease) of hip     Hypertension     Urinary incontinence      PSH:  has a past surgical history that includes hx orthopaedic (Bilateral, 01/01/2004) and colonoscopy (N/A, 10/18/2022). All: has no allergies on file.    MEDS:   Current Outpatient Medications   Medication Sig    lisinopriL (PRINIVIL, ZESTRIL) 20 mg tablet TAKE 1 AND 1/2 TABLETS BY MOUTH DAILY    metoprolol succinate (TOPROL-XL) 50 mg XL tablet TAKE 1 TABLET BY MOUTH DAILY    acetaminophen (TYLENOL) 650 mg TbER Take 650 mg by mouth as needed for Pain.    tamsulosin (FLOMAX) 0.4 mg capsule Take 0.4 mg by mouth nightly. amLODIPine (NORVASC) 10 mg tablet TAKE 1 TABLET BY MOUTH EVERY DAY    latanoprost (XALATAN) 0.005 % ophthalmic solution Administer 1 Drop to both eyes nightly. varicella-zoster recombinant, PF, (Shingrix, PF,) 50 mcg/0.5 mL susr injection 0.5mL by IntraMUSCular route once now and then repeat in 2-6 months     No current facility-administered medications for this visit. FH: family history includes Coronary Art Dis in his maternal grandfather and maternal grandmother; Hypertension in his mother and sister. SH:  reports that he quit smoking about 5 years ago. His smoking use included cigarettes. He has a 20.00 pack-year smoking history. He has never used smokeless tobacco. He reports current alcohol use. He reports that he does not use drugs.      Review of Systems - History obtained from the patient  General ROS: no fever, chills, fatigue, body aches  Psychological ROS: no change in anxiety, depression, SI/HI  Ophthalmic ROS: no blurred vision, myopia, double vision  ENT ROS: no dysphagia, otalgia, otorrhea, rhinorrhea, post nasal drip  Respiratory ROS: no cough, shortness of breath, or wheezing  Cardiovascular ROS: no chest pain or dyspnea on exertion  Gastrointestinal ROS: no abdominal pain, change in bowel habits, or black or bloody stools  Genito-Urinary ROS: no frequency, urgency, incontinence, dysuria, hematouria  Musculoskeletal ROS: no arthralagia, myalgia  Neurological ROS: no headaches, dizziness, lightheadedness, tremors, seizures  Dermatological ROS: no rash or lesions    OBJECTIVE:   Vitals: Visit Vitals  BP (!) 142/63 (BP 1 Location: Left upper arm, BP Patient Position: Sitting, BP Cuff Size: Adult)   Pulse 73   Temp 98.6 °F (37 °C) (Temporal)   Resp 16   Ht 5' 11\" (1.803 m)   Wt 192 lb (87.1 kg) SpO2 99%   BMI 26.78 kg/m²      Gen: Pleasant 74 y.o.  male in NAD. HEENT: PERRLA. EOMI. OP moist and pink. Neck: Supple. No LAD. HEART: RRR, No M/G/R.      LUNGS: CTAB No W/R. ABDOMEN: S, NT, ND, BS+. EXTREMITIES: Warm. No C/C/E.    MUSCULOSKELETAL: Normal ROM, muscle strength 5/5 all groups. NEURO: Alert and oriented x 3. Cranial nerves grossly intact. No focal sensory or motor deficits noted. SKIN: Warm. Dry. No rashes or other lesions noted. ASSESSMENT/ PLAN: Diagnoses and all orders for this visit:    1. Primary hypertension  -     METABOLIC PANEL, COMPREHENSIVE; Future  -     CBC WITH AUTOMATED DIFF; Future    2. Benign prostatic hyperplasia without lower urinary tract symptoms  -     PSA SCREENING (SCREENING); Future    3. Systolic CHF, chronic ([facility]  Assessment & Plan:   monitored by specialist. No acute findings meriting change in the plan      4. Mixed hyperlipidemia  -     LIPID PANEL; Future    5. Encounter for screening for malignant neoplasm of prostate   -     PSA SCREENING (SCREENING); Future    6. Neck mass  -     US THYROID/PARATHYROID/SOFT TISS; Future    1. Primary hypertension  BP seems to be elevated today. A repeat blood pressure 140/90 was noted with a manual cuff. Patient reports feeling agitated after discussing his colonoscopy results. I recommended continuing current dose of medications, eating a low sodium diet, and increasing exercise. Recheck CMP and CBC. He will need to check his blood pressure at home and call back with readings.    -     METABOLIC PANEL, COMPREHENSIVE; Future  -     CBC WITH AUTOMATED DIFF; Future    2. Benign prostatic hyperplasia without lower urinary tract symptoms  I have ordered labs to check PSA.  -     PSA SCREENING (SCREENING); Future    3. Systolic CHF, chronic ([facility]  Assessment & Plan:   monitored by specialist. No acute findings meriting change in the plan    4.  Mixed hyperlipidemia  Lipid panel shows cholesterol seems to be well controlled. I recommended continuing current dose of medications, eating a low fat diet, and increasing exercise. Recheck lipid panel.    -     LIPID PANEL; Future    5. Encounter for screening for malignant neoplasm of prostate   See #2  -     PSA SCREENING (SCREENING); Future    6. Neck mass  I have ordered an US for the neck mass. Upon palpation of the posterior left neck there seemed to be a lymph node. -     US THYROID/PARATHYROID/SOFT TISS; Future    I have reviewed the patient's medications and risks/side effects/benefits were discussed. Diagnosis(-es) explained to patient and questions answered. Literature provided where appropriate.      Written by Stephanie Ball, as dictated by Jailene Mckeon MD.

## 2023-01-09 NOTE — PROGRESS NOTES
1. \"Have you been to the ER, urgent care clinic since your last visit? Hospitalized since your last visit? \" No    2. \"Have you seen or consulted any other health care providers outside of the [address] [place] [address] since your last visit? \" No     3. For patients aged 39-70: Has the patient had a colonoscopy / FIT/ Cologuard?  Yes - no Care Gap present

## 2023-01-10 ENCOUNTER — DOCUMENTATION ONLY (OUTPATIENT)
Dept: INTERNAL MEDICINE CLINIC | Age: 73
End: 2023-01-10

## 2023-01-10 LAB
ALBUMIN SERPL-MCNC: 3.7 G/DL (ref 3.5–5)
ALBUMIN/GLOB SERPL: 1 (ref 1.1–2.2)
ALP SERPL-CCNC: 80 U/L (ref 45–117)
ALT SERPL-CCNC: 19 U/L (ref 12–78)
ANION GAP SERPL CALC-SCNC: 4 MMOL/L (ref 5–15)
AST SERPL-CCNC: 36 U/L (ref 15–37)
BASOPHILS # BLD: 0.1 K/UL (ref 0–0.1)
BASOPHILS NFR BLD: 1 % (ref 0–1)
BILIRUB SERPL-MCNC: 0.6 MG/DL (ref 0.2–1)
BUN SERPL-MCNC: 19 MG/DL (ref 6–20)
BUN/CREAT SERPL: 17 (ref 12–20)
CALCIUM SERPL-MCNC: 9.5 MG/DL (ref 8.5–10.1)
CHLORIDE SERPL-SCNC: 107 MMOL/L (ref 97–108)
CHOLEST SERPL-MCNC: 230 MG/DL
CO2 SERPL-SCNC: 28 MMOL/L (ref 21–32)
CREAT SERPL-MCNC: 1.12 MG/DL (ref 0.7–1.3)
DIFFERENTIAL METHOD BLD: ABNORMAL
EOSINOPHIL # BLD: 0.2 K/UL (ref 0–0.4)
EOSINOPHIL NFR BLD: 4 % (ref 0–7)
ERYTHROCYTE [DISTWIDTH] IN BLOOD BY AUTOMATED COUNT: 12 % (ref 11.5–14.5)
GLOBULIN SER CALC-MCNC: 3.7 G/DL (ref 2–4)
GLUCOSE SERPL-MCNC: 109 MG/DL (ref 65–100)
HCT VFR BLD AUTO: 40.9 % (ref 36.6–50.3)
HDLC SERPL-MCNC: 68 MG/DL
HDLC SERPL: 3.4 (ref 0–5)
HGB BLD-MCNC: 12.9 G/DL (ref 12.1–17)
IMM GRANULOCYTES # BLD AUTO: 0 K/UL (ref 0–0.04)
IMM GRANULOCYTES NFR BLD AUTO: 0 % (ref 0–0.5)
LDLC SERPL CALC-MCNC: 137.6 MG/DL (ref 0–100)
LYMPHOCYTES # BLD: 1.6 K/UL (ref 0.8–3.5)
LYMPHOCYTES NFR BLD: 31 % (ref 12–49)
MCH RBC QN AUTO: 32 PG (ref 26–34)
MCHC RBC AUTO-ENTMCNC: 31.5 G/DL (ref 30–36.5)
MCV RBC AUTO: 101.5 FL (ref 80–99)
MONOCYTES # BLD: 0.6 K/UL (ref 0–1)
MONOCYTES NFR BLD: 12 % (ref 5–13)
NEUTS SEG # BLD: 2.8 K/UL (ref 1.8–8)
NEUTS SEG NFR BLD: 52 % (ref 32–75)
NRBC # BLD: 0 K/UL (ref 0–0.01)
NRBC BLD-RTO: 0 PER 100 WBC
PLATELET # BLD AUTO: 198 K/UL (ref 150–400)
PMV BLD AUTO: 11.6 FL (ref 8.9–12.9)
POTASSIUM SERPL-SCNC: 5.5 MMOL/L (ref 3.5–5.1)
PROT SERPL-MCNC: 7.4 G/DL (ref 6.4–8.2)
PSA SERPL-MCNC: 1.9 NG/ML (ref 0.01–4)
RBC # BLD AUTO: 4.03 M/UL (ref 4.1–5.7)
SODIUM SERPL-SCNC: 139 MMOL/L (ref 136–145)
TRIGL SERPL-MCNC: 122 MG/DL (ref ?–150)
VLDLC SERPL CALC-MCNC: 24.4 MG/DL
WBC # BLD AUTO: 5.4 K/UL (ref 4.1–11.1)

## 2023-01-12 ENCOUNTER — TELEPHONE (OUTPATIENT)
Dept: INTERNAL MEDICINE CLINIC | Age: 73
End: 2023-01-12

## 2023-01-12 DIAGNOSIS — M47.9 OSTEOARTHRITIS OF BACK: ICD-10-CM

## 2023-01-12 DIAGNOSIS — I10 PRIMARY HYPERTENSION: Primary | ICD-10-CM

## 2023-01-12 NOTE — TELEPHONE ENCOUNTER
Feb 8th surgery scheduled for cataract removal.    States wants dr to use 1/9 visit for surgical clearance     States will drop off the clearance form tomorrow to     Please be advised.

## 2023-01-13 NOTE — PROGRESS NOTES
Lipid panel: Worse  Your current lab results reveal a elevated total cholesterol,and ldl. Your total cholesterol should be under 200, triglycerides under 150, and your ldl under 100. Work on following a low fat diet and exercise at least three times a week. PSA: PSA has increased although it remains in normal range. CBC:Normal red and white blood cell levels. CMP: Electrolytes are normal except for an elevation in glucose and potassium. The renal and liver function are normal.    Please place an order for repeat CMP to be done in 1 week to follow-up on the potassium level.

## 2023-01-16 NOTE — TELEPHONE ENCOUNTER
Two pt identifiers confirmed. The patient's pre-op to [facility] was faxed and scanned into the patient's chart. The patient called about his lab and imaging results. Per , \"his Lipid panel: Worse  Your current lab results reveal a elevated total cholesterol,and ldl. Your total cholesterol should be under 200, triglycerides under 150, and your ldl under 100. Work on following a low fat diet and exercise at least three times a week. PSA: PSA has increased although it remains in normal range. CBC:Normal red and white blood cell levels. CMP: Electrolytes are normal except for an elevation in glucose and potassium. The renal and liver function are normal.    Please place an order for repeat CMP to be done in 1 week to follow-up on the potassium level. The ultrasound did not reveal any kind of soft tissue mass or lymph node inflammation. The area of concern is described as possible spur on the cervical spine. I advised the patient the to contact number for Dr. Villela Standing at [facility]. \"    The patient [facility]. Pt verbalized understanding of information discussed w/ no further questions at this time.   Signed By: Franca Carpenter LPN     February 1, 3412

## 2023-01-18 ENCOUNTER — HOSPITAL ENCOUNTER (OUTPATIENT)
Dept: ULTRASOUND IMAGING | Age: 73
Discharge: HOME | End: 2023-01-18
Attending: FAMILY MEDICINE
Payer: MEDICARE

## 2023-01-18 DIAGNOSIS — R22.1 NECK MASS: ICD-10-CM

## 2023-01-18 PROCEDURE — 76536 US EXAM OF HEAD AND NECK: CPT

## 2023-01-30 ENCOUNTER — TELEPHONE (OUTPATIENT)
Dept: INTERNAL MEDICINE CLINIC | Age: 73
End: 2023-01-30

## 2023-01-30 NOTE — TELEPHONE ENCOUNTER
----- Message from Vickie Montero sent at 1/30/2023  3:15 PM EST -----  Subject: Message to Provider    QUESTIONS  Information for Provider? Patient is trying to return a call from the   office.  Please try again asap.   ---------------------------------------------------------------------------  --------------  Octavia Rothman INFO  [phone]; OK to leave message on voicemail  ---------------------------------------------------------------------------  --------------  SCRIPT ANSWERS  undefined

## 2023-01-31 NOTE — PROGRESS NOTES
4/22/2022    Chief Complaint: Right knee pain    Assessment: Osteoarthritis right knee    Plan: This patient and I did discuss the many options in treating knee osteoarthritis. We did discuss that we could continue to seek out nonoperative modalities, such as: NSAIDs, oral and topical analgesics, knee injections, knee braces, physical therapy, stretching, strengthening, and weight loss strategies, activity modification, ambulatory assistive devices. The patient stated their understanding with this, but and would like to proceed with nonsurgical management in the form of viscosupplementation. HPI: This is a 74 y.o. male who complains of right knee pain. Onset was gradual.  The patient has had activity dependent pain for six months. The patient has tried activity modification, physical therapy exercises, injections have worked only temporarily in the past, this was cortisone. The pain is in the lateral knee, it is severe at times in intensity. The patient feels unstable with the knee, fears falling, and has significant limitation with activities of daily living, recreation, and walks with a limp. Past Medical History:   Diagnosis Date    Arthritis     DJD (degenerative joint disease) of hip     Hypertension        Past Surgical History:   Procedure Laterality Date    HX ORTHOPAEDIC  2004    Total R hip replacement       Current Outpatient Medications on File Prior to Visit   Medication Sig Dispense Refill    naproxen (NAPROSYN) 500 mg tablet Take 500 mg by mouth two (2) times daily (with meals).  amLODIPine (NORVASC) 10 mg tablet TAKE 1 TABLET BY MOUTH EVERY DAY 90 Tablet 1    ibuprofen (MOTRIN) 600 mg tablet Take 1 Tablet by mouth every six (6) hours as needed for Pain.  20 Tablet 0    lisinopriL (PRINIVIL, ZESTRIL) 20 mg tablet TAKE 1 AND 1/2 TABLETS BY MOUTH DAILY 135 Tablet 1    metoprolol succinate (TOPROL-XL) 50 mg XL tablet TAKE 1 TABLET BY MOUTH DAILY 90 Tablet 1    latanoprost Detail Level: Detailed (XALATAN) 0.005 % ophthalmic solution Administer 1 Drop to both eyes nightly.  predniSONE (STERAPRED DS) 10 mg dose pack Per Dose Pack instructions (Patient not taking: Reported on 2022) 21 Tablet 0    Omeprazole delayed release (PRILOSEC D/R) 20 mg tablet Take 1 Tab by mouth daily. (Patient not taking: Reported on 2022) 30 Tab 3     No current facility-administered medications on file prior to visit. No Known Allergies    Family History   Problem Relation Age of Onset    Hypertension Mother     Coronary Art Dis Maternal Grandmother     Coronary Art Dis Maternal Grandfather     Hypertension Sister        Social History     Socioeconomic History    Marital status: SINGLE   Tobacco Use    Smoking status: Former Smoker     Packs/day: 1.00     Years: 20.00     Pack years: 20.00     Quit date: 10/1/2017     Years since quittin.5    Smokeless tobacco: Never Used    Tobacco comment: doesn't inhale   Substance and Sexual Activity    Alcohol use: Yes     Comment: occasionally cachorro    Drug use: No    Sexual activity: Not Currently         Review of Systems:       General: Denies headache, lethargy, fever, weight loss  Ears/Nose/Throat: Denies ear discharge, drainage, nosebleeds, hoarse voice, dental problems  Cardiovascular: Denies chest pain, shortness of breath  Lungs: Denies chest pain, breathing problems, wheezing, pneumonia  Stomach: Denies stomach pain, heartburn, constipation, irritable bowel  Skin: Denies rash, sores, open wounds  Musculoskeletal: Admits to knee pain  Genitourinary: Denies dysuria, hematuria, polyuria  Gastrointestinal: Denies constipation, obstipation, diarrhea  Neurological: Denies changes in sight, smell, hearing, taste, seizures. Denies loss of consciousness.   Psychiatric: Denies depression, sleep pattern changes, anxiety, change in personality  Endocrine: Denies mood swings, heat or cold intolerance  Hematologic/Lymphatic: Denies anemia, purpura, Add 57416 Cpt? (Important Note: In 2017 The Use Of 49121 Is Being Tracked By Cms To Determine Future Global Period Reimbursement For Global Periods): yes Wound Diameter In Cm(Optional): 0 petechia  Allergic/Immunologic: Denies swelling of throat, pain or swelling at lymph nodes      Physical Examination:    Visit Vitals  /70   Pulse 67   Temp 98 °F (36.7 °C)   Resp 16   Ht 5' 11\" (1.803 m)   Wt 192 lb (87.1 kg)   BMI 26.78 kg/m²        General: AOX3, no apparent distress  Psychiatric: mood and affect appropriate  Lungs: breathing is symmetric and unlabored bilaterally  Heart: regular rate and rhythm  Abdomen: no guarding  Head: normocephalic, atraumatic  Skin: No significant abnormalities, good turgor  Sensation intact to light touch: L1-S1 dermatomes  Muscular exam: 5/5 strength in all major muscle groups unless noted in specialty exam.    Extremities:      Left upper extremity: Full active and passive range of motion without pain, deformity, no open wound, strength 5/5 in all major muscle groups. Right upper extremity: Full active and passive range of motion without pain, deformity, no open wound, strength 5/5 in all major muscle groups. Left lower extremity: Full active and passive range of motion without pain, deformity, no open wound, strength 5/5 in all major muscle groups. Right lower extremity:  No deformity is noted. Range of motion of the knee is 0-120. Ligamentous testing of the knee indicates stability of the the MCL, LCL, PCL, and ACL. Lachman's, anterior and posterior drawer tests are specifically negative. Lateral joint line tenderness to palpation is noted. Popliteal area is unremarkable. 1+  for effusion. + patellar crepitus. Patella tracks centrally. Pivot shift is negative. Strength testing is indicative of 5/5 strength at hip flexion, extension, knee flexion and extension, tibialis anterior, EHL, and FHL. Sensation is intact to light touch in the L1-S1 dermatomes. Capillary refill is less than 2 seconds in the toes.     Diagnostics:    Pertinent Diagnostics:  Xrays are available of the right knee, they indicate moderate lateral compartment osteoarthritis of the knee joint, no significant other findings, no other osseus abnormalities, fractures, or dislocations. MRI confirms this, with addition of lateral and medial meniscal tears. Mr. Joseph Aaron has a reminder for a \"due or due soon\" health maintenance. I have asked that he contact his primary care provider for follow-up on this health maintenance.

## 2023-02-03 NOTE — TELEPHONE ENCOUNTER
Two pt identifiers confirmed. Per ,  \"Lipid panel: Worse  Your current lab results reveal a elevated total cholesterol,and ldl. Your total cholesterol should be under 200, triglycerides under 150, and your ldl under 100. Work on following a low fat diet and exercise at least three times a week. PSA: PSA has increased although it remains in normal range. CBC:Normal red and white blood cell levels. CMP: Electrolytes are normal except for an elevation in glucose and potassium. The renal and liver function are normal.    Please place an order for repeat CMP to be done in 1 week to follow-up on the potassium level. \"  Pt verbalized understanding of information discussed w/ no further questions at this time.     Signed By: Price Covarrubias LPN     February 3, 8925

## 2023-02-16 ENCOUNTER — LAB ONLY (OUTPATIENT)
Dept: INTERNAL MEDICINE CLINIC | Age: 73
End: 2023-02-16

## 2023-02-16 DIAGNOSIS — I10 PRIMARY HYPERTENSION: ICD-10-CM

## 2023-02-17 LAB
ALBUMIN SERPL-MCNC: 3.8 G/DL (ref 3.5–5)
ALBUMIN/GLOB SERPL: 1.3 (ref 1.1–2.2)
ALP SERPL-CCNC: 85 U/L (ref 45–117)
ALT SERPL-CCNC: 18 U/L (ref 12–78)
ANION GAP SERPL CALC-SCNC: 10 MMOL/L (ref 5–15)
AST SERPL-CCNC: 13 U/L (ref 15–37)
BILIRUB SERPL-MCNC: 0.3 MG/DL (ref 0.2–1)
BUN SERPL-MCNC: 22 MG/DL (ref 6–20)
BUN/CREAT SERPL: 19 (ref 12–20)
CALCIUM SERPL-MCNC: 9.3 MG/DL (ref 8.5–10.1)
CHLORIDE SERPL-SCNC: 106 MMOL/L (ref 97–108)
CO2 SERPL-SCNC: 23 MMOL/L (ref 21–32)
CREAT SERPL-MCNC: 1.16 MG/DL (ref 0.7–1.3)
GLOBULIN SER CALC-MCNC: 3 G/DL (ref 2–4)
GLUCOSE SERPL-MCNC: 133 MG/DL (ref 65–100)
POTASSIUM SERPL-SCNC: 4.6 MMOL/L (ref 3.5–5.1)
PROT SERPL-MCNC: 6.8 G/DL (ref 6.4–8.2)
SODIUM SERPL-SCNC: 139 MMOL/L (ref 136–145)

## 2023-02-17 NOTE — PROGRESS NOTES
CMP:Normal electrolyte levels except for an elevation in the glucose level, normal renal and liver function

## 2023-02-21 ENCOUNTER — TELEPHONE (OUTPATIENT)
Dept: INTERNAL MEDICINE CLINIC | Age: 73
End: 2023-02-21

## 2023-02-27 ENCOUNTER — OFFICE VISIT (OUTPATIENT)
Dept: INTERNAL MEDICINE CLINIC | Age: 73
End: 2023-02-27
Payer: MEDICARE

## 2023-02-27 VITALS
HEIGHT: 71 IN | BODY MASS INDEX: 26.88 KG/M2 | OXYGEN SATURATION: 98 % | HEART RATE: 74 BPM | RESPIRATION RATE: 17 BRPM | DIASTOLIC BLOOD PRESSURE: 66 MMHG | WEIGHT: 192 LBS | TEMPERATURE: 97.7 F | SYSTOLIC BLOOD PRESSURE: 136 MMHG

## 2023-02-27 DIAGNOSIS — Z83.3 FAMILY HISTORY OF DIABETES MELLITUS (DM): ICD-10-CM

## 2023-02-27 DIAGNOSIS — M62.838 MUSCLE SPASM: ICD-10-CM

## 2023-02-27 DIAGNOSIS — M62.89 OTHER SPECIFIED DISORDERS OF MUSCLE: ICD-10-CM

## 2023-02-27 DIAGNOSIS — I73.9 PAD (PERIPHERAL ARTERY DISEASE) (HCC): Primary | ICD-10-CM

## 2023-02-27 PROCEDURE — 99213 OFFICE O/P EST LOW 20 MIN: CPT | Performed by: FAMILY MEDICINE

## 2023-02-27 PROCEDURE — 1101F PT FALLS ASSESS-DOCD LE1/YR: CPT | Performed by: FAMILY MEDICINE

## 2023-02-27 PROCEDURE — G8417 CALC BMI ABV UP PARAM F/U: HCPCS | Performed by: FAMILY MEDICINE

## 2023-02-27 PROCEDURE — G8427 DOCREV CUR MEDS BY ELIG CLIN: HCPCS | Performed by: FAMILY MEDICINE

## 2023-02-27 PROCEDURE — G8510 SCR DEP NEG, NO PLAN REQD: HCPCS | Performed by: FAMILY MEDICINE

## 2023-02-27 PROCEDURE — 3017F COLORECTAL CA SCREEN DOC REV: CPT | Performed by: FAMILY MEDICINE

## 2023-02-27 PROCEDURE — G8536 NO DOC ELDER MAL SCRN: HCPCS | Performed by: FAMILY MEDICINE

## 2023-02-27 NOTE — PROGRESS NOTES
SUBJECTIVE:   Mr. Huang Runneil is a 74 y.o. male who is here for follow up of routine medical issues. PAD: Pt reports that he had a Peripheral Artery Disease test using [employer] through [facility] on 2/23/23. It indicated decreased blood flow on lower extremities, worse on left leg. Family History of Diabetes: He notes a familial history of diabetes in aunts and uncles. He states that his sister is pre-diabetic and insulin resistant. Muscle Spasms: Pt indicates frequent muscle spasms in his legs. He states these are resolved by eating a teaspoon of mustard. Pt also reports tingling and stabbing sensations in his fingertips. He states that [address] indicated that it may be radiculopathy from his neck. Pt reports a sharp pain on his left ribs, which is aggravated by movement. He also notes engaging in resistance training by lifting weights and walks his dog for cardio. At this time, he is otherwise doing well and has brought no other complaints to my attention today. For a list of the medical issues addressed today, see the assessment and plan below. PMH:   Past Medical History:   Diagnosis Date    Arthritis     DJD (degenerative joint disease) of hip     Hypertension     Urinary incontinence      PSH:  has a past surgical history that includes hx orthopaedic (Bilateral, 01/01/2004); colonoscopy (N/A, 10/18/2022); and hx cataract removal (Left, 02/2023). All: has no allergies on file. MEDS:   Current Outpatient Medications   Medication Sig    tolterodine tartrate (TOLTERODINE PO) Take  by mouth.    lisinopriL (PRINIVIL, ZESTRIL) 20 mg tablet TAKE 1 AND 1/2 TABLETS BY MOUTH DAILY    metoprolol succinate (TOPROL-XL) 50 mg XL tablet TAKE 1 TABLET BY MOUTH DAILY    acetaminophen (TYLENOL) 650 mg TbER Take 650 mg by mouth as needed for Pain.    tamsulosin (FLOMAX) 0.4 mg capsule Take 0.4 mg by mouth nightly.     amLODIPine (NORVASC) 10 mg tablet TAKE 1 TABLET BY MOUTH EVERY DAY latanoprost (XALATAN) 0.005 % ophthalmic solution Administer 1 Drop to both eyes nightly. varicella-zoster recombinant, PF, (Shingrix, PF,) 50 mcg/0.5 mL susr injection 0.5mL by IntraMUSCular route once now and then repeat in 2-6 months     No current facility-administered medications for this visit. FH: family history includes Coronary Art Dis in his maternal grandfather and maternal grandmother; Hypertension in his mother and sister. SH:  reports that he quit smoking about 5 years ago. His smoking use included cigarettes. He has a 20.00 pack-year smoking history. He has never used smokeless tobacco. He reports current alcohol use. He reports that he does not use drugs. Review of Systems - History obtained from the patient  General ROS: no fever, chills, fatigue, body aches  Psychological ROS: no change in anxiety, depression, SI/HI  Ophthalmic ROS: no blurred vision, myopia, double vision  ENT ROS: no dysphagia, otalgia, otorrhea, rhinorrhea, post nasal drip  Respiratory ROS: no cough, shortness of breath, or wheezing  Cardiovascular ROS: no chest pain or dyspnea on exertion  Gastrointestinal ROS: no abdominal pain, change in bowel habits, or black or bloody stools  Genito-Urinary ROS: no frequency, urgency, incontinence, dysuria, hematouria  Musculoskeletal ROS: no arthralagia, myalgia, +muscle spasms  Neurological ROS: no headaches, dizziness, lightheadedness, tremors, seizures, +tingling in fingertips  Dermatological ROS: no rash or lesions    OBJECTIVE:   Vitals: Visit Vitals  /66 (BP 1 Location: Left upper arm, BP Patient Position: Sitting, BP Cuff Size: Adult)   Pulse 74   Temp 97.7 °F (36.5 °C) (Temporal)   Resp 17   Ht 5' 11\" (1.803 m)   Wt 192 lb (87.1 kg)   SpO2 98%   BMI 26.78 kg/m²      Gen: Pleasant 74 y.o.  male in NAD. HEENT: PERRLA. EOMI. OP moist and pink. Neck: Supple. No LAD. HEART: RRR, No M/G/R.      LUNGS: CTAB No W/R. Holly Rao EXTREMITIES: Warm.  No C/C/E. MUSCULOSKELETAL: Normal ROM, muscle strength 5/5 all groups. NEURO: Alert and oriented x 3. Cranial nerves grossly intact. No focal sensory or motor deficits noted. SKIN: Warm. Dry. No rashes or other lesions noted. ASSESSMENT/ PLAN: Diagnoses and all orders for this visit:    1. PAD (peripheral artery disease) (HCC)  -     REFERRAL TO VASCULAR SURGERY    2. Family history of diabetes mellitus (DM)  -     HEMOGLOBIN A1C WITH EAG; Future    3. Muscle spasm  -     MAGNESIUM; Future    4. Other specified disorders of muscle   -     MAGNESIUM; Future    1. PAD (peripheral artery disease) ([facility]  I have referred him to vascular surgery for further evaluation of his blood flow. -     REFERRAL TO VASCULAR SURGERY    2. Family history of diabetes mellitus (DM)  Pt has a family history of diabetes, so I ordered labs to check his A1c. He noted being conscious of diet and exercising, which I encouraged him to continue.     -     HEMOGLOBIN A1C WITH EAG; Future    3. Muscle spasm  I will check his magnesium levels to investigate the cause of the spasms. -     MAGNESIUM; Future    4. Other specified disorders of muscle   -     MAGNESIUM; Future    I have reviewed the patient's medications and risks/side effects/benefits were discussed. Diagnosis(-es) explained to patient and questions answered. Literature provided where appropriate.      Written by Clovis Wiley, as dictated by Steve Borden MD.

## 2023-02-27 NOTE — PROGRESS NOTES
1. \"Have you been to the ER, urgent care clinic since your last visit? Hospitalized since your last visit? \" No    2. \"Have you seen or consulted any other health care providers outside of the [address] [place] [address] since your last visit? \" Yes [address] [place], 02/2023      3. For patients aged 39-70: Has the patient had a colonoscopy / FIT/ Cologuard?  Yes - no Care Gap present

## 2023-02-28 LAB
EST. AVERAGE GLUCOSE BLD GHB EST-MCNC: 105 MG/DL
HBA1C MFR BLD: 5.3 % (ref 4–5.6)
MAGNESIUM SERPL-MCNC: 2.4 MG/DL (ref 1.6–2.4)

## 2023-03-10 RX ORDER — AMLODIPINE BESYLATE 10 MG/1
TABLET ORAL
Qty: 90 TABLET | Refills: 1 | Status: SHIPPED | OUTPATIENT
Start: 2023-03-10

## 2023-06-10 RX ORDER — METOPROLOL SUCCINATE 50 MG/1
TABLET, EXTENDED RELEASE ORAL
Qty: 90 TABLET | Refills: 2 | Status: SHIPPED | OUTPATIENT
Start: 2023-06-10

## 2023-06-10 RX ORDER — LISINOPRIL 20 MG/1
TABLET ORAL
Qty: 135 TABLET | Refills: 2 | Status: SHIPPED | OUTPATIENT
Start: 2023-06-10

## 2023-09-08 RX ORDER — AMLODIPINE BESYLATE 10 MG/1
TABLET ORAL
Qty: 90 TABLET | Refills: 1 | Status: SHIPPED | OUTPATIENT
Start: 2023-09-08

## 2024-03-04 RX ORDER — LISINOPRIL 20 MG/1
TABLET ORAL
Qty: 135 TABLET | Refills: 2 | Status: SHIPPED | OUTPATIENT
Start: 2024-03-04

## 2024-03-04 RX ORDER — METOPROLOL SUCCINATE 50 MG/1
TABLET, EXTENDED RELEASE ORAL
Qty: 90 TABLET | Refills: 2 | Status: SHIPPED | OUTPATIENT
Start: 2024-03-04

## 2024-03-04 RX ORDER — AMLODIPINE BESYLATE 10 MG/1
TABLET ORAL
Qty: 90 TABLET | Refills: 1 | Status: SHIPPED | OUTPATIENT
Start: 2024-03-04

## 2024-03-11 ENCOUNTER — TELEPHONE (OUTPATIENT)
Age: 74
End: 2024-03-11

## 2024-03-11 NOTE — TELEPHONE ENCOUNTER
Left shoulder pain, Tingling in fingers noticed 6 months   Pt stated maybe due to \"old age\"  Pt stated has Disk in back bone spurs in neck.    Informed pt of documentation.

## 2024-03-19 ENCOUNTER — TELEPHONE (OUTPATIENT)
Age: 74
End: 2024-03-19

## 2024-03-19 NOTE — TELEPHONE ENCOUNTER
Pt had to reschedule due to provider ooo.    Pt states he wants in office with pcp for shoulder pain    Pt states can wait until next week or the week after if needed    Can pt be worked in.

## 2024-04-10 ENCOUNTER — OFFICE VISIT (OUTPATIENT)
Age: 74
End: 2024-04-10
Payer: MEDICARE

## 2024-04-10 VITALS
OXYGEN SATURATION: 98 % | TEMPERATURE: 98.1 F | RESPIRATION RATE: 18 BRPM | BODY MASS INDEX: 26.04 KG/M2 | WEIGHT: 186 LBS | SYSTOLIC BLOOD PRESSURE: 147 MMHG | HEIGHT: 71 IN | DIASTOLIC BLOOD PRESSURE: 63 MMHG | HEART RATE: 73 BPM

## 2024-04-10 DIAGNOSIS — G89.29 CHRONIC LEFT SHOULDER PAIN: ICD-10-CM

## 2024-04-10 DIAGNOSIS — I73.9 PERIPHERAL VASCULAR DISEASE, UNSPECIFIED (HCC): ICD-10-CM

## 2024-04-10 DIAGNOSIS — G89.29 CHRONIC PAIN OF RIGHT KNEE: Primary | ICD-10-CM

## 2024-04-10 DIAGNOSIS — M62.830 BACK MUSCLE SPASM: ICD-10-CM

## 2024-04-10 DIAGNOSIS — M25.512 CHRONIC LEFT SHOULDER PAIN: ICD-10-CM

## 2024-04-10 DIAGNOSIS — M25.561 CHRONIC PAIN OF RIGHT KNEE: Primary | ICD-10-CM

## 2024-04-10 PROBLEM — I50.22 SYSTOLIC CHF, CHRONIC (HCC): Status: RESOLVED | Noted: 2021-05-11 | Resolved: 2024-04-10

## 2024-04-10 PROCEDURE — 4004F PT TOBACCO SCREEN RCVD TLK: CPT | Performed by: FAMILY MEDICINE

## 2024-04-10 PROCEDURE — 99214 OFFICE O/P EST MOD 30 MIN: CPT | Performed by: FAMILY MEDICINE

## 2024-04-10 PROCEDURE — G8427 DOCREV CUR MEDS BY ELIG CLIN: HCPCS | Performed by: FAMILY MEDICINE

## 2024-04-10 PROCEDURE — 1123F ACP DISCUSS/DSCN MKR DOCD: CPT | Performed by: FAMILY MEDICINE

## 2024-04-10 PROCEDURE — G8419 CALC BMI OUT NRM PARAM NOF/U: HCPCS | Performed by: FAMILY MEDICINE

## 2024-04-10 PROCEDURE — 3077F SYST BP >= 140 MM HG: CPT | Performed by: FAMILY MEDICINE

## 2024-04-10 PROCEDURE — 3078F DIAST BP <80 MM HG: CPT | Performed by: FAMILY MEDICINE

## 2024-04-10 RX ORDER — CYANOCOBALAMIN (VITAMIN B-12) 500 MCG
1 TABLET ORAL
COMMUNITY

## 2024-04-10 RX ORDER — ASPIRIN 81 MG/1
1 TABLET ORAL
COMMUNITY

## 2024-04-10 RX ORDER — TIZANIDINE 2 MG/1
2 TABLET ORAL EVERY 8 HOURS PRN
Qty: 30 TABLET | Refills: 1 | Status: SHIPPED | OUTPATIENT
Start: 2024-04-10

## 2024-04-10 SDOH — ECONOMIC STABILITY: FOOD INSECURITY: WITHIN THE PAST 12 MONTHS, THE FOOD YOU BOUGHT JUST DIDN'T LAST AND YOU DIDN'T HAVE MONEY TO GET MORE.: NEVER TRUE

## 2024-04-10 SDOH — ECONOMIC STABILITY: INCOME INSECURITY: HOW HARD IS IT FOR YOU TO PAY FOR THE VERY BASICS LIKE FOOD, HOUSING, MEDICAL CARE, AND HEATING?: NOT HARD AT ALL

## 2024-04-10 SDOH — ECONOMIC STABILITY: HOUSING INSECURITY
IN THE LAST 12 MONTHS, WAS THERE A TIME WHEN YOU DID NOT HAVE A STEADY PLACE TO SLEEP OR SLEPT IN A SHELTER (INCLUDING NOW)?: NO

## 2024-04-10 SDOH — ECONOMIC STABILITY: FOOD INSECURITY: WITHIN THE PAST 12 MONTHS, YOU WORRIED THAT YOUR FOOD WOULD RUN OUT BEFORE YOU GOT MONEY TO BUY MORE.: NEVER TRUE

## 2024-04-10 ASSESSMENT — PATIENT HEALTH QUESTIONNAIRE - PHQ9
SUM OF ALL RESPONSES TO PHQ QUESTIONS 1-9: 0
SUM OF ALL RESPONSES TO PHQ QUESTIONS 1-9: 0
1. LITTLE INTEREST OR PLEASURE IN DOING THINGS: NOT AT ALL
SUM OF ALL RESPONSES TO PHQ QUESTIONS 1-9: 0
SUM OF ALL RESPONSES TO PHQ QUESTIONS 1-9: 0
2. FEELING DOWN, DEPRESSED OR HOPELESS: NOT AT ALL
SUM OF ALL RESPONSES TO PHQ9 QUESTIONS 1 & 2: 0

## 2024-04-10 NOTE — PROGRESS NOTES
\"Have you been to the ER, urgent care clinic since your last visit?  Hospitalized since your last visit?\"    NO    “Have you seen or consulted any other health care providers outside of [facility] since your last visit?”    NO            Click Here for Release of Records Request    
specialist's office but notes he has some white-coat HTN.      Pt saw Cardiology last year and states he had a normal evaluation. Pt reports he was told he could get a stent for pain if his leg pain worsens, if his walking ability worsens. Pt has lost 6 lbs since 2/27/2023.     At this time, he is otherwise doing well and has brought no other complaints to my attention today.  For a list of the medical issues addressed today, see the assessment and plan below.    PMH:   Past Medical History:   Diagnosis Date    Arthritis     DJD (degenerative joint disease) of hip     Hypertension     Urinary incontinence      PSH:  has a past surgical history that includes Colonoscopy (N/A, 10/18/2022); Cataract removal (Left, 02/2023); and orthopedic surgery (Bilateral, 01/01/2004).    All: has No Known Allergies.   MEDS:   Current Outpatient Medications   Medication Sig    Omega-3 Fatty Acids (FISH OIL) 300 MG CAPS 1 capsule    TOLTERODINE TARTRATE ER PO Take by mouth    cyanocobalamin 100 MCG tablet Take by mouth    aspirin 81 MG EC tablet Take 1 tablet by mouth Every Day    Ascorbic Acid 100 MG CHEW Take 1 tablet by mouth Every Day    tiZANidine (ZANAFLEX) 2 MG tablet Take 1 tablet by mouth every 8 hours as needed (muscle spasms)    metoprolol succinate (TOPROL XL) 50 MG extended release tablet TAKE 1 TABLET BY MOUTH DAILY    lisinopril (PRINIVIL;ZESTRIL) 20 MG tablet TAKE 1 AND 1/2 TABLETS BY MOUTH DAILY    amLODIPine (NORVASC) 10 MG tablet TAKE 1 TABLET BY MOUTH EVERY DAY    acetaminophen (TYLENOL) 650 MG extended release tablet Take 1 tablet by mouth as needed    latanoprost (XALATAN) 0.005 % ophthalmic solution Apply 1 drop to eye    tamsulosin (FLOMAX) 0.4 MG capsule Take 1 capsule by mouth    zoster recombinant adjuvanted vaccine (SHINGRIX) 50 MCG/0.5ML SUSR injection 0.5mL by IntraMUSCular route once now and then repeat in 2-6 months (Patient not taking: Reported on 4/10/2024)     No current facility-administered

## 2024-04-25 ENCOUNTER — TELEPHONE (OUTPATIENT)
Age: 74
End: 2024-04-25

## 2024-04-25 NOTE — TELEPHONE ENCOUNTER
Returned a missed phone call from the patient about scheduling an appt and LVM for the patient to call back to get scheduled.

## 2024-04-30 ENCOUNTER — OFFICE VISIT (OUTPATIENT)
Age: 74
End: 2024-04-30
Payer: MEDICARE

## 2024-04-30 VITALS — BODY MASS INDEX: 26.04 KG/M2 | WEIGHT: 186 LBS | HEIGHT: 71 IN

## 2024-04-30 DIAGNOSIS — M17.11 UNILATERAL PRIMARY OSTEOARTHRITIS, RIGHT KNEE: Primary | ICD-10-CM

## 2024-04-30 PROCEDURE — 1123F ACP DISCUSS/DSCN MKR DOCD: CPT | Performed by: ORTHOPAEDIC SURGERY

## 2024-04-30 PROCEDURE — G8419 CALC BMI OUT NRM PARAM NOF/U: HCPCS | Performed by: ORTHOPAEDIC SURGERY

## 2024-04-30 PROCEDURE — 99213 OFFICE O/P EST LOW 20 MIN: CPT | Performed by: ORTHOPAEDIC SURGERY

## 2024-04-30 PROCEDURE — G8427 DOCREV CUR MEDS BY ELIG CLIN: HCPCS | Performed by: ORTHOPAEDIC SURGERY

## 2024-04-30 PROCEDURE — 1036F TOBACCO NON-USER: CPT | Performed by: ORTHOPAEDIC SURGERY

## 2024-04-30 ASSESSMENT — PATIENT HEALTH QUESTIONNAIRE - PHQ9
1. LITTLE INTEREST OR PLEASURE IN DOING THINGS: NOT AT ALL
SUM OF ALL RESPONSES TO PHQ9 QUESTIONS 1 & 2: 0
SUM OF ALL RESPONSES TO PHQ QUESTIONS 1-9: 0
2. FEELING DOWN, DEPRESSED OR HOPELESS: NOT AT ALL

## 2024-04-30 NOTE — PROGRESS NOTES
Identified pt with two pt identifiers (name and ). Reviewed chart in preparation for visit and have obtained necessary documentation.    Kenroy Rahman is a 74 y.o. male No chief complaint on file.  .    Vitals:    24 0901   Weight: 84.4 kg (186 lb)   Height: 1.803 m (5' 11\")          1. Have you been to the ER, urgent care clinic since your last visit?  Hospitalized since your last visit?  no     2. Have you seen or consulted any other health care providers outside of the [facility] since your last visit?  Include any pap smears or colon screening.  no

## 2024-04-30 NOTE — PROGRESS NOTES
4/30/2024      CC: right knee pain    HPI:      This is a 74 y.o. year old male who presents for a follow up visit.  The patient was last seen and diagnosed with right knee osteoarthritis.   The patient's treatments since the most recent visit have comprised of viscosupplementation almost two years ago.   The patient has had several months' relief of the chief complaint.        PMH:  Past Medical History:   Diagnosis Date    Arthritis     DJD (degenerative joint disease) of hip     Hypertension     Urinary incontinence        PSxHx:  Past Surgical History:   Procedure Laterality Date    CATARACT REMOVAL Left 02/2023    COLONOSCOPY N/A 10/18/2022    COLONOSCOPY performed by Scott Welsh MD at [facility] ENDOSCOPY    ORTHOPEDIC SURGERY Bilateral 01/01/2004    THR       Meds:    Current Outpatient Medications:     Multiple Vitamins-Minerals (MULTIVITAMIN ADULTS PO), Take by mouth Ronit Natural for muscle cramps, Disp: , Rfl:     Omega-3 Fatty Acids (FISH OIL) 300 MG CAPS, 1 capsule, Disp: , Rfl:     TOLTERODINE TARTRATE ER PO, Take by mouth, Disp: , Rfl:     aspirin 81 MG EC tablet, Take 1 tablet by mouth Every Day, Disp: , Rfl:     Ascorbic Acid 100 MG CHEW, Take 1 tablet by mouth Every Day, Disp: , Rfl:     tiZANidine (ZANAFLEX) 2 MG tablet, Take 1 tablet by mouth every 8 hours as needed (muscle spasms), Disp: 30 tablet, Rfl: 1    metoprolol succinate (TOPROL XL) 50 MG extended release tablet, TAKE 1 TABLET BY MOUTH DAILY, Disp: 90 tablet, Rfl: 2    lisinopril (PRINIVIL;ZESTRIL) 20 MG tablet, TAKE 1 AND 1/2 TABLETS BY MOUTH DAILY, Disp: 135 tablet, Rfl: 2    amLODIPine (NORVASC) 10 MG tablet, TAKE 1 TABLET BY MOUTH EVERY DAY, Disp: 90 tablet, Rfl: 1    acetaminophen (TYLENOL) 650 MG extended release tablet, Take 1 tablet by mouth as needed, Disp: , Rfl:     latanoprost (XALATAN) 0.005 % ophthalmic solution, Apply 1 drop to eye, Disp: , Rfl:     tamsulosin (FLOMAX) 0.4 MG capsule, Take 1 capsule by mouth, Disp: ,

## 2024-05-09 ENCOUNTER — TELEPHONE (OUTPATIENT)
Age: 74
End: 2024-05-09

## 2024-05-09 DIAGNOSIS — Z87.891 PERSONAL HISTORY OF NICOTINE DEPENDENCE: ICD-10-CM

## 2024-05-09 DIAGNOSIS — Z12.2 ENCOUNTER FOR SCREENING FOR MALIGNANT NEOPLASM OF LUNG: ICD-10-CM

## 2024-05-09 DIAGNOSIS — Z87.891 FORMER SMOKER: Primary | ICD-10-CM

## 2024-05-09 NOTE — TELEPHONE ENCOUNTER
States needs referral order for ANNUAL LUNG CANCER SCREENING        Call pt to advise [phone]

## 2024-05-09 NOTE — TELEPHONE ENCOUNTER
Called patient. 2 patient identifiers used to identify patient (name and ). Patient informed that he was approved for Gel injections and would be receiving a call from his pharmacy with further information. Patient voiced understanding.

## 2024-05-09 NOTE — TELEPHONE ENCOUNTER
Patient called in requesting an update on the status of getting his gel injections for his right knee. He would like a return phone call at [phone].

## 2024-05-21 ENCOUNTER — HOSPITAL ENCOUNTER (OUTPATIENT)
Facility: HOSPITAL | Age: 74
Discharge: HOME | End: 2024-05-24
Attending: FAMILY MEDICINE
Payer: MEDICARE

## 2024-05-21 DIAGNOSIS — Z87.891 PERSONAL HISTORY OF NICOTINE DEPENDENCE: ICD-10-CM

## 2024-05-21 DIAGNOSIS — Z12.2 ENCOUNTER FOR SCREENING FOR MALIGNANT NEOPLASM OF LUNG: ICD-10-CM

## 2024-05-21 DIAGNOSIS — Z87.891 FORMER SMOKER: ICD-10-CM

## 2024-05-21 PROCEDURE — 71271 CT THORAX LUNG CANCER SCR C-: CPT

## 2024-05-22 ENCOUNTER — OFFICE VISIT (OUTPATIENT)
Age: 74
End: 2024-05-22
Payer: MEDICARE

## 2024-05-22 ENCOUNTER — TELEPHONE (OUTPATIENT)
Age: 74
End: 2024-05-22

## 2024-05-22 VITALS
BODY MASS INDEX: 25.37 KG/M2 | HEIGHT: 71 IN | DIASTOLIC BLOOD PRESSURE: 68 MMHG | TEMPERATURE: 97.6 F | SYSTOLIC BLOOD PRESSURE: 122 MMHG | HEART RATE: 65 BPM | OXYGEN SATURATION: 99 % | RESPIRATION RATE: 20 BRPM | WEIGHT: 181.2 LBS

## 2024-05-22 DIAGNOSIS — M65.331 TRIGGER MIDDLE FINGER OF RIGHT HAND: ICD-10-CM

## 2024-05-22 DIAGNOSIS — E78.2 MIXED HYPERLIPIDEMIA: ICD-10-CM

## 2024-05-22 DIAGNOSIS — L72.9 SCALP CYST: ICD-10-CM

## 2024-05-22 DIAGNOSIS — I10 ESSENTIAL (PRIMARY) HYPERTENSION: ICD-10-CM

## 2024-05-22 DIAGNOSIS — Z00.00 MEDICARE ANNUAL WELLNESS VISIT, SUBSEQUENT: Primary | ICD-10-CM

## 2024-05-22 DIAGNOSIS — Z87.891 PERSONAL HISTORY OF TOBACCO USE: ICD-10-CM

## 2024-05-22 LAB
ALBUMIN SERPL-MCNC: 3.7 G/DL (ref 3.5–5)
ALBUMIN/GLOB SERPL: 1.2 (ref 1.1–2.2)
ALP SERPL-CCNC: 76 U/L (ref 45–117)
ALT SERPL-CCNC: 15 U/L (ref 12–78)
ANION GAP SERPL CALC-SCNC: 2 MMOL/L (ref 5–15)
AST SERPL-CCNC: 10 U/L (ref 15–37)
BASOPHILS # BLD: 0 K/UL (ref 0–0.1)
BASOPHILS NFR BLD: 1 % (ref 0–1)
BILIRUB SERPL-MCNC: 0.6 MG/DL (ref 0.2–1)
BUN SERPL-MCNC: 16 MG/DL (ref 6–20)
BUN/CREAT SERPL: 15 (ref 12–20)
CALCIUM SERPL-MCNC: 9.3 MG/DL (ref 8.5–10.1)
CHLORIDE SERPL-SCNC: 110 MMOL/L (ref 97–108)
CHOLEST SERPL-MCNC: 192 MG/DL
CO2 SERPL-SCNC: 29 MMOL/L (ref 21–32)
CREAT SERPL-MCNC: 1.09 MG/DL (ref 0.7–1.3)
DIFFERENTIAL METHOD BLD: ABNORMAL
EOSINOPHIL # BLD: 0.3 K/UL (ref 0–0.4)
EOSINOPHIL NFR BLD: 5 % (ref 0–7)
ERYTHROCYTE [DISTWIDTH] IN BLOOD BY AUTOMATED COUNT: 12.2 % (ref 11.5–14.5)
GLOBULIN SER CALC-MCNC: 3 G/DL (ref 2–4)
GLUCOSE SERPL-MCNC: 102 MG/DL (ref 65–100)
HCT VFR BLD AUTO: 35.9 % (ref 36.6–50.3)
HDLC SERPL-MCNC: 59 MG/DL
HDLC SERPL: 3.3 (ref 0–5)
HGB BLD-MCNC: 11.3 G/DL (ref 12.1–17)
IMM GRANULOCYTES # BLD AUTO: 0 K/UL (ref 0–0.04)
IMM GRANULOCYTES NFR BLD AUTO: 0 % (ref 0–0.5)
LDLC SERPL CALC-MCNC: 112 MG/DL (ref 0–100)
LYMPHOCYTES # BLD: 1.7 K/UL (ref 0.8–3.5)
LYMPHOCYTES NFR BLD: 35 % (ref 12–49)
MCH RBC QN AUTO: 31.1 PG (ref 26–34)
MCHC RBC AUTO-ENTMCNC: 31.5 G/DL (ref 30–36.5)
MCV RBC AUTO: 98.9 FL (ref 80–99)
MONOCYTES # BLD: 0.5 K/UL (ref 0–1)
MONOCYTES NFR BLD: 11 % (ref 5–13)
NEUTS SEG # BLD: 2.3 K/UL (ref 1.8–8)
NEUTS SEG NFR BLD: 48 % (ref 32–75)
NRBC # BLD: 0 K/UL (ref 0–0.01)
NRBC BLD-RTO: 0 PER 100 WBC
PLATELET # BLD AUTO: 210 K/UL (ref 150–400)
PMV BLD AUTO: 11.3 FL (ref 8.9–12.9)
POTASSIUM SERPL-SCNC: 4.3 MMOL/L (ref 3.5–5.1)
PROT SERPL-MCNC: 6.7 G/DL (ref 6.4–8.2)
RBC # BLD AUTO: 3.63 M/UL (ref 4.1–5.7)
SODIUM SERPL-SCNC: 141 MMOL/L (ref 136–145)
TRIGL SERPL-MCNC: 105 MG/DL
VLDLC SERPL CALC-MCNC: 21 MG/DL
WBC # BLD AUTO: 4.8 K/UL (ref 4.1–11.1)

## 2024-05-22 PROCEDURE — G0439 PPPS, SUBSEQ VISIT: HCPCS | Performed by: FAMILY MEDICINE

## 2024-05-22 PROCEDURE — G0296 VISIT TO DETERM LDCT ELIG: HCPCS | Performed by: FAMILY MEDICINE

## 2024-05-22 PROCEDURE — 1123F ACP DISCUSS/DSCN MKR DOCD: CPT | Performed by: FAMILY MEDICINE

## 2024-05-22 PROCEDURE — 3078F DIAST BP <80 MM HG: CPT | Performed by: FAMILY MEDICINE

## 2024-05-22 PROCEDURE — 3074F SYST BP LT 130 MM HG: CPT | Performed by: FAMILY MEDICINE

## 2024-05-22 ASSESSMENT — PATIENT HEALTH QUESTIONNAIRE - PHQ9
SUM OF ALL RESPONSES TO PHQ9 QUESTIONS 1 & 2: 0
1. LITTLE INTEREST OR PLEASURE IN DOING THINGS: NOT AT ALL
SUM OF ALL RESPONSES TO PHQ QUESTIONS 1-9: 0
2. FEELING DOWN, DEPRESSED OR HOPELESS: NOT AT ALL

## 2024-05-22 ASSESSMENT — LIFESTYLE VARIABLES
HOW OFTEN DO YOU HAVE A DRINK CONTAINING ALCOHOL: MONTHLY OR LESS
HOW MANY STANDARD DRINKS CONTAINING ALCOHOL DO YOU HAVE ON A TYPICAL DAY: 1 OR 2

## 2024-05-22 NOTE — PROGRESS NOTES
Medicare Annual Wellness Visit    Kenroy Rahman is here for Medicare AWV    Assessment & Plan   Medicare annual wellness visit, subsequent  Personal history of tobacco use  -     [place] VISIT TO DISCUSS LUNG CA SCREEN W LDCT  Recommendations for Preventive Services Due: see orders and patient instructions/AVS.  Recommended screening schedule for the next 5-10 years is provided to the patient in written form: see Patient Instructions/AVS.     No follow-ups on file.     Subjective       Patient's complete Health Risk Assessment and screening values have been reviewed and are found in Flowsheets. The following problems were reviewed today and where indicated follow up appointments were made and/or referrals ordered.    Positive Risk Factor Screenings with Interventions:                 Dentist Screen:  Have you seen the dentist within the past year?: (!) No    Intervention:  Advised to schedule with their dentist        Advanced Directives:  Do you have a Living Will?: (!) No    Intervention:         Lung Cancer Screening:  LDCT Screening: Discussed with patient the benefits and harms of screening, follow-up diagnostic testing, over-diagnosis, false positive rate, and total radiation exposure. Counseled on the importance of adherence to annual lung cancer LDCT screening, impact of comorbidities, ability and willingness to undergo diagnosis and treatment. Counseled on the importance of maintaining cigarette smoking abstinence and cessation. The patient has a history of >20 pack years and is either still smoking or quit within the last 15 years. There are no signs or symptoms of lung cancer.                  Objective   Vitals:    05/22/24 0911   BP: 122/68   Site: Left Upper Arm   Position: Sitting   Cuff Size: Medium Adult   Pulse: 65   Resp: 20   Temp: 97.6 °F (36.4 °C)   TempSrc: Oral   SpO2: 99%   Weight: 82.2 kg (181 lb 3.2 oz)   Height: 1.803 m (5' 11\")      Body mass index is 25.27 kg/m².               No Known 
\"Have you been to the ER, urgent care clinic since your last visit?  Hospitalized since your last visit?\"    NO    “Have you seen or consulted any other health care providers outside of [facility] since your last visit?”    NO            Click Here for Release of Records Request  
all orders for this visit:    Medicare annual wellness visit, subsequent  Overall the patient is doing well. We discussed in detail the patient trying new things such as learning how to swim. We discussed that learning  how to swim can alleviate some fears around water and traveling but also how swimming is good exercise as well. Pt committed to looking into swim classes to learn how to swim.  Personal history of tobacco use  Waiting on the report back of lung CT scan.  -     [place] VISIT TO DISCUSS LUNG CA SCREEN W LDCT    Essential (primary) hypertension  BP is controlled. Pt should continue with current regimen. We will recheck labs today. I ordered labs for CBC and CMP.   -     CBC with Auto Differential; Future  -     Comprehensive Metabolic Panel; Future    Mixed hyperlipidemia  I ordered labs for lipid panel.  -     Lipid Panel; Future    Scalp cyst  I referred the patient to Dermatology for the removal of his cyst on the L. Posterior scalp   -     External Referral To Dermatology    Trigger middle finger of right hand  I referred the patient to Dr Sukhdeep Whitehead ( Orthopedic surgery elbow, hand wrist.) for his potential trigger finger R. Hand 3rd finger.  -     AFL - Sukhdeep Whitehead MD, Orthopaedic Surgery (elbow, hand, wrist), [place]    I, Aliyah-Andrea Zambrano, am scribing for and in the presence of Marley Neil MD.     I have reviewed the note documented by the scribe.  The services provided are my own.  The documentation is accurate. Marley Neil MD   I have reviewed the patient's medications and risks/side effects/benefits were discussed. Diagnosis(-es) explained to patient and questions answered. Literature provided where appropriate.

## 2024-05-22 NOTE — TELEPHONE ENCOUNTER
Maira with [facility] called and confirmed the order of Euflexxa is to be delievered to our office on 5/24/24.

## 2024-05-22 NOTE — PATIENT INSTRUCTIONS

## 2024-05-22 NOTE — TELEPHONE ENCOUNTER
Called patient. 2 patient identifiers used to identify patient (name and ). Spoke with patient after receiving message of patient inquiring of when shipment of injections would be arriving to office. Patient was upset because he was told the medicine was being shipped out and this writer explained to patient that I was also told the same thing by the pharmacy. However, I did explain to patient that pharmacy stated the medicine would be shipped out between 24-24. I also called the [facility] pharmacy at 11:57 AM and they stated they still needed the patient's consent to ship out med. I gave patient this information and he stated he had already gave his consent. I gave patient the number to reach out to pharmacy. Patient stated he would give them 1 last call.

## 2024-05-22 NOTE — TELEPHONE ENCOUNTER
Patient stopped by the office inquiring about whether or not we had received his gel injections for his right knee from the specialty pharmacy as shortly after the 9th of May he had received a phone call from the speciality pharmacy stating that the medication would be delivered to our office. After checking with medical staff it was confirmed that no delivery of gel injections for this patient had been received. Upon asking the patient if he recalled the name of the speciality pharmacy with whom he had spoken with he did not recall. I agreed to reach out to Lori who handles these prior auths and see if she is able to determine which pharmacy had reached out to the patient and see if we can correct  this situation. Patient is asking for a call at [phone] once an update is available.

## 2024-05-23 ENCOUNTER — TELEPHONE (OUTPATIENT)
Age: 74
End: 2024-05-23

## 2024-05-23 NOTE — TELEPHONE ENCOUNTER
Patient was notified at this time that pharmacy ([facility]) had called and stated they will be shipping out his Euflexxa on 5/24/24. Patient voiced understanding.

## 2024-05-24 ENCOUNTER — TELEPHONE (OUTPATIENT)
Age: 74
End: 2024-05-24

## 2024-05-24 NOTE — TELEPHONE ENCOUNTER
LVM for patient letting him know that the Euflexxa Gel injections had been received and to call back to schedule his series.

## 2024-05-31 ENCOUNTER — OFFICE VISIT (OUTPATIENT)
Age: 74
End: 2024-05-31
Payer: MEDICARE

## 2024-05-31 VITALS — HEIGHT: 71 IN | BODY MASS INDEX: 25.28 KG/M2 | RESPIRATION RATE: 17 BRPM

## 2024-05-31 DIAGNOSIS — M17.11 UNILATERAL PRIMARY OSTEOARTHRITIS, RIGHT KNEE: Primary | ICD-10-CM

## 2024-05-31 PROCEDURE — 20610 DRAIN/INJ JOINT/BURSA W/O US: CPT | Performed by: ORTHOPAEDIC SURGERY

## 2024-05-31 RX ORDER — HYALURONATE SODIUM 10 MG/ML
20 SYRINGE (ML) INTRAARTICULAR ONCE
Status: COMPLETED | OUTPATIENT
Start: 2024-05-31 | End: 2024-05-31

## 2024-05-31 RX ADMIN — Medication 20 MG: at 09:27

## 2024-05-31 ASSESSMENT — PATIENT HEALTH QUESTIONNAIRE - PHQ9
2. FEELING DOWN, DEPRESSED OR HOPELESS: NOT AT ALL
SUM OF ALL RESPONSES TO PHQ QUESTIONS 1-9: 0
SUM OF ALL RESPONSES TO PHQ QUESTIONS 1-9: 0
SUM OF ALL RESPONSES TO PHQ9 QUESTIONS 1 & 2: 0
SUM OF ALL RESPONSES TO PHQ QUESTIONS 1-9: 0
1. LITTLE INTEREST OR PLEASURE IN DOING THINGS: NOT AT ALL
SUM OF ALL RESPONSES TO PHQ QUESTIONS 1-9: 0

## 2024-05-31 NOTE — PROGRESS NOTES
Identified pt with two pt identifiers (name and ). Reviewed chart in preparation for visit and have obtained necessary documentation.    Kenroy Rahman is a 74 y.o. male Knee Pain (Right knee pain)  .    Vitals:    24 0920   Resp: 17   Height: 1.803 m (5' 10.98\")          1. Have you been to the ER, urgent care clinic since your last visit?  Hospitalized since your last visit?  no     2. Have you seen or consulted any other health care providers outside of the [facility] since your last visit?  Include any pap smears or colon screening.  no

## 2024-05-31 NOTE — PROGRESS NOTES
Date of Procedure: 5/31/2024  PROCEDURE NOTE: Right knee injection of Euflexxa    Consent was obtained from the patient. The correct site was identified after confirmation with the patient.  Following identification and confirmation of the correct site with the patient, the superolateral right knee was prepped in the normal sterile fashion.  A local anesthetic of 1% lidocaine without epinephrine was then administered to the local tissues.  Following, an injection of Euflexxa 20 mg viscosupplementation prefilled syringe was administered to the right knee.  The needle was then withdrawn and the injection site dressed with a sterile bandage at the conclusion.  The procedure was well tolerated by the patient.  No immediate adverse reactions were noted.  Post injection instructions were given.      Diagnosis: Right Knee Osteoarthritis

## 2024-06-07 ENCOUNTER — OFFICE VISIT (OUTPATIENT)
Age: 74
End: 2024-06-07

## 2024-06-07 ENCOUNTER — TELEPHONE (OUTPATIENT)
Age: 74
End: 2024-06-07

## 2024-06-07 DIAGNOSIS — M17.11 UNILATERAL PRIMARY OSTEOARTHRITIS, RIGHT KNEE: Primary | ICD-10-CM

## 2024-06-07 RX ORDER — HYALURONATE SODIUM 10 MG/ML
20 SYRINGE (ML) INTRAARTICULAR ONCE
Status: COMPLETED | OUTPATIENT
Start: 2024-06-07 | End: 2024-06-07

## 2024-06-07 RX ADMIN — Medication 20 MG: at 09:10

## 2024-06-07 ASSESSMENT — PATIENT HEALTH QUESTIONNAIRE - PHQ9
SUM OF ALL RESPONSES TO PHQ QUESTIONS 1-9: 0
2. FEELING DOWN, DEPRESSED OR HOPELESS: NOT AT ALL
SUM OF ALL RESPONSES TO PHQ QUESTIONS 1-9: 0
SUM OF ALL RESPONSES TO PHQ QUESTIONS 1-9: 0
SUM OF ALL RESPONSES TO PHQ9 QUESTIONS 1 & 2: 0
1. LITTLE INTEREST OR PLEASURE IN DOING THINGS: NOT AT ALL
SUM OF ALL RESPONSES TO PHQ QUESTIONS 1-9: 0

## 2024-06-07 NOTE — PROGRESS NOTES
Identified pt with two pt identifiers (name and ). Reviewed chart in preparation for visit and have obtained necessary documentation.    Kenroy Rahman is a 74 y.o. male Follow-up (Right knee pain)  .    There were no vitals filed for this visit.       1. Have you been to the ER, urgent care clinic since your last visit?  Hospitalized since your last visit?  no     2. Have you seen or consulted any other health care providers outside of the [facility] since your last visit?  Include any pap smears or colon screening.  no

## 2024-06-07 NOTE — PROGRESS NOTES
Date of Procedure: 6/7/2024  PROCEDURE NOTE: Right knee injection of Euflexxa    Consent was obtained from the patient. The correct site was identified after confirmation with the patient.  Following identification and confirmation of the correct site with the patient, the superolateral right knee was prepped in the normal sterile fashion.  A local anesthetic of 1% lidocaine without epinephrine was then administered to the local tissues.  Following, an injection of Euflexxa 20 mg viscosupplementation prefilled syringe was administered to the right knee.  The needle was then withdrawn and the injection site dressed with a sterile bandage at the conclusion.  The procedure was well tolerated by the patient.  No immediate adverse reactions were noted.  Post injection instructions were given.      Diagnosis: Right Knee Osteoarthritis

## 2024-06-07 NOTE — TELEPHONE ENCOUNTER
LVM for patient asking for call back to get his appt for 6/14/24 change from Dr. Naranjo to ROSALEE Hein due to change in the doctor's availability.

## 2024-06-12 ENCOUNTER — TELEPHONE (OUTPATIENT)
Age: 74
End: 2024-06-12

## 2024-06-12 NOTE — TELEPHONE ENCOUNTER
M For the patient to call back to confirm he would be okay seeing ROSALEE Hein for his injection on 6/14/24 or if he would prefer to be scheduled for next week would the doctor would be available.

## 2024-06-13 ENCOUNTER — TELEPHONE (OUTPATIENT)
Age: 74
End: 2024-06-13

## 2024-06-13 NOTE — TELEPHONE ENCOUNTER
Patient called back and has agreed to be seen by ROSALEE Hein for his final gel injection instead of  tomorrow 6/14/24 at 9:20 as Dr. Naranjo will be out of the office.

## 2024-06-13 NOTE — TELEPHONE ENCOUNTER
LVM for patient to C/B to confirm he would be okay with seeing and receiving his gel injection(s) from Scott TURK instead of Dr. Naranjo as the doctor will be out of the office on 6/14/24.

## 2024-06-14 ENCOUNTER — OFFICE VISIT (OUTPATIENT)
Age: 74
End: 2024-06-14
Payer: MEDICARE

## 2024-06-14 VITALS — BODY MASS INDEX: 25.91 KG/M2 | HEIGHT: 70 IN | WEIGHT: 181 LBS

## 2024-06-14 DIAGNOSIS — M17.11 UNILATERAL PRIMARY OSTEOARTHRITIS, RIGHT KNEE: Primary | ICD-10-CM

## 2024-06-14 PROCEDURE — 20610 DRAIN/INJ JOINT/BURSA W/O US: CPT | Performed by: PHYSICIAN ASSISTANT

## 2024-06-14 RX ORDER — HYALURONATE SODIUM 10 MG/ML
20 SYRINGE (ML) INTRAARTICULAR ONCE
Status: COMPLETED | OUTPATIENT
Start: 2024-06-14 | End: 2024-06-14

## 2024-06-14 RX ADMIN — Medication 20 MG: at 09:25

## 2024-06-14 ASSESSMENT — PATIENT HEALTH QUESTIONNAIRE - PHQ9
1. LITTLE INTEREST OR PLEASURE IN DOING THINGS: NOT AT ALL
SUM OF ALL RESPONSES TO PHQ QUESTIONS 1-9: 0
2. FEELING DOWN, DEPRESSED OR HOPELESS: NOT AT ALL
SUM OF ALL RESPONSES TO PHQ QUESTIONS 1-9: 0
SUM OF ALL RESPONSES TO PHQ QUESTIONS 1-9: 0
SUM OF ALL RESPONSES TO PHQ9 QUESTIONS 1 & 2: 0
SUM OF ALL RESPONSES TO PHQ QUESTIONS 1-9: 0

## 2024-06-14 NOTE — PROGRESS NOTES
Identified pt with two pt identifiers (name and ). Reviewed chart in preparation for visit and have obtained necessary documentation.    Kenroy Rahman is a 74 y.o. male Knee Pain (Rt knee pain- 3rd Euflexxa injection)  .    Vitals:    24 0901   Weight: 82.1 kg (181 lb)   Height: 1.778 m (5' 10\")          1. Have you been to the ER, urgent care clinic since your last visit?  Hospitalized since your last visit?  no     2. Have you seen or consulted any other health care providers outside of the [facility] since your last visit?  Include any pap smears or colon screening.  no

## 2024-06-14 NOTE — PROGRESS NOTES
Date of Procedure: 6/14/2024  PROCEDURE NOTE: Right knee injection of Euflexxa    Consent was obtained from the patient. The correct site was identified after confirmation with the patient.  Following identification and confirmation of the correct site with the patient, the superolateral right knee was prepped in the normal sterile fashion.  A local anesthetic of 1% lidocaine without epinephrine was then administered to the local tissues.  Following, an injection of Euflexxa 20 mg viscosupplementation prefilled syringe was administered to the right knee.  The needle was then withdrawn and the injection site dressed with a sterile bandage at the conclusion.  The procedure was well tolerated by the patient.  No immediate adverse reactions were noted.  Post injection instructions were given.      Diagnosis: Right Knee Osteoarthritis

## 2024-06-18 ENCOUNTER — TELEPHONE (OUTPATIENT)
Age: 74
End: 2024-06-18

## 2024-06-18 DIAGNOSIS — E87.8 SERUM CHLORIDE INCREASED: Primary | ICD-10-CM

## 2024-06-18 NOTE — TELEPHONE ENCOUNTER
Patient notified of results and response from Marley Neil MD    States understanding       Repeat a CBC in 2 weeks.   CMP: Electrolytes are normal except for slight elevation of chloride and glucose.  These are not significant elevations.  You have normal renal and liver function.   CBC: White cell levels are normal.  You have mild anemia   Lipid panel: Lipids have improved when compared to prior results.  There is a mild elevation of the LDL.

## 2024-07-02 ENCOUNTER — TELEPHONE (OUTPATIENT)
Age: 74
End: 2024-07-02

## 2024-07-09 DIAGNOSIS — E87.8 SERUM CHLORIDE INCREASED: ICD-10-CM

## 2024-07-09 LAB
BASOPHILS # BLD: 0.1 K/UL (ref 0–0.1)
BASOPHILS NFR BLD: 1 % (ref 0–1)
DIFFERENTIAL METHOD BLD: ABNORMAL
EOSINOPHIL # BLD: 0.3 K/UL (ref 0–0.4)
EOSINOPHIL NFR BLD: 5 % (ref 0–7)
ERYTHROCYTE [DISTWIDTH] IN BLOOD BY AUTOMATED COUNT: 12 % (ref 11.5–14.5)
HCT VFR BLD AUTO: 38.5 % (ref 36.6–50.3)
HGB BLD-MCNC: 12.3 G/DL (ref 12.1–17)
IMM GRANULOCYTES # BLD AUTO: 0 K/UL (ref 0–0.04)
IMM GRANULOCYTES NFR BLD AUTO: 0 % (ref 0–0.5)
LYMPHOCYTES # BLD: 2 K/UL (ref 0.8–3.5)
LYMPHOCYTES NFR BLD: 37 % (ref 12–49)
MCH RBC QN AUTO: 31.6 PG (ref 26–34)
MCHC RBC AUTO-ENTMCNC: 31.9 G/DL (ref 30–36.5)
MCV RBC AUTO: 99 FL (ref 80–99)
MONOCYTES # BLD: 0.7 K/UL (ref 0–1)
MONOCYTES NFR BLD: 12 % (ref 5–13)
NEUTS SEG # BLD: 2.4 K/UL (ref 1.8–8)
NEUTS SEG NFR BLD: 45 % (ref 32–75)
NRBC # BLD: 0 K/UL (ref 0–0.01)
NRBC BLD-RTO: 0 PER 100 WBC
PLATELET # BLD AUTO: 212 K/UL (ref 150–400)
PMV BLD AUTO: 11.9 FL (ref 8.9–12.9)
RBC # BLD AUTO: 3.89 M/UL (ref 4.1–5.7)
WBC # BLD AUTO: 5.5 K/UL (ref 4.1–11.1)

## 2024-08-19 NOTE — TELEPHONE ENCOUNTER
Patient only received 12 tablets and usually gets 90 day supply:    tamsulosin (FLOMAX) 0.4 MG capsule    [id number] [place], [place] - [address] - P [phone] - F [phone]

## 2024-08-20 NOTE — TELEPHONE ENCOUNTER
Future Appointments:  No future appointments.     Last Appointment With Me:  5/22/2024     Requested Prescriptions     Pending Prescriptions Disp Refills    tamsulosin (FLOMAX) 0.4 MG capsule 90 capsule 1     Sig: Take 1 capsule by mouth daily

## 2024-08-22 RX ORDER — TAMSULOSIN HYDROCHLORIDE 0.4 MG/1
0.4 CAPSULE ORAL DAILY
Qty: 90 CAPSULE | Refills: 1 | Status: SHIPPED | OUTPATIENT
Start: 2024-08-22

## 2024-12-01 RX ORDER — AMLODIPINE BESYLATE 10 MG/1
TABLET ORAL
Qty: 90 TABLET | Refills: 1 | Status: SHIPPED | OUTPATIENT
Start: 2024-12-01

## 2024-12-09 ENCOUNTER — OFFICE VISIT (OUTPATIENT)
Age: 74
End: 2024-12-09
Payer: MEDICARE

## 2024-12-09 VITALS — HEIGHT: 70 IN | WEIGHT: 180 LBS | BODY MASS INDEX: 25.77 KG/M2

## 2024-12-09 DIAGNOSIS — M17.11 UNILATERAL PRIMARY OSTEOARTHRITIS, RIGHT KNEE: Primary | ICD-10-CM

## 2024-12-09 PROCEDURE — 1123F ACP DISCUSS/DSCN MKR DOCD: CPT | Performed by: ORTHOPAEDIC SURGERY

## 2024-12-09 PROCEDURE — G8419 CALC BMI OUT NRM PARAM NOF/U: HCPCS | Performed by: ORTHOPAEDIC SURGERY

## 2024-12-09 PROCEDURE — 1036F TOBACCO NON-USER: CPT | Performed by: ORTHOPAEDIC SURGERY

## 2024-12-09 PROCEDURE — 99213 OFFICE O/P EST LOW 20 MIN: CPT | Performed by: ORTHOPAEDIC SURGERY

## 2024-12-09 PROCEDURE — G8484 FLU IMMUNIZE NO ADMIN: HCPCS | Performed by: ORTHOPAEDIC SURGERY

## 2024-12-09 PROCEDURE — 1159F MED LIST DOCD IN RCRD: CPT | Performed by: ORTHOPAEDIC SURGERY

## 2024-12-09 PROCEDURE — 20610 DRAIN/INJ JOINT/BURSA W/O US: CPT | Performed by: ORTHOPAEDIC SURGERY

## 2024-12-09 PROCEDURE — G8427 DOCREV CUR MEDS BY ELIG CLIN: HCPCS | Performed by: ORTHOPAEDIC SURGERY

## 2024-12-09 PROCEDURE — 1126F AMNT PAIN NOTED NONE PRSNT: CPT | Performed by: ORTHOPAEDIC SURGERY

## 2024-12-09 RX ORDER — HYALURONATE SODIUM 10 MG/ML
20 SYRINGE (ML) INTRAARTICULAR ONCE
Status: COMPLETED | OUTPATIENT
Start: 2024-12-09 | End: 2024-12-09

## 2024-12-09 RX ORDER — AMOXICILLIN 500 MG/1
TABLET, FILM COATED ORAL
COMMUNITY
Start: 2024-12-05

## 2024-12-09 RX ADMIN — Medication 20 MG: at 08:50

## 2024-12-09 ASSESSMENT — PATIENT HEALTH QUESTIONNAIRE - PHQ9
SUM OF ALL RESPONSES TO PHQ QUESTIONS 1-9: 0
SUM OF ALL RESPONSES TO PHQ QUESTIONS 1-9: 0
SUM OF ALL RESPONSES TO PHQ9 QUESTIONS 1 & 2: 0
1. LITTLE INTEREST OR PLEASURE IN DOING THINGS: NOT AT ALL
2. FEELING DOWN, DEPRESSED OR HOPELESS: NOT AT ALL
SUM OF ALL RESPONSES TO PHQ QUESTIONS 1-9: 0
SUM OF ALL RESPONSES TO PHQ QUESTIONS 1-9: 0

## 2024-12-09 NOTE — PROGRESS NOTES
Identified pt with two pt identifiers (name and ). Reviewed chart in preparation for visit and have obtained necessary documentation.    Kenroy Rahman is a 74 y.o. male  Chief Complaint   Patient presents with    Injections      FU // RT knee pain     Ht 1.778 m (5' 10\")   Wt 81.6 kg (180 lb)   BMI 25.83 kg/m²     1. Have you been to the ER, urgent care clinic since your last visit?  Hospitalized since your last visit?no    2. Have you seen or consulted any other health care providers outside of the [facility] since your last visit?  Include any pap smears or colon screening. no  
gross deformity or deficit is noted.          Diagnostics:    Pertinent Diagnostics: none today    Assessment: Right knee Osteoarthritis  Plan:    This patient I had a long discussion regarding treatment options.  We went over the nonoperative options, continued medications, injections of multiple types, bracing, physical therapy, maintenance of ideal body weight.  Patient has elected to proceed with viscosupplementation.      Mr. Rahman has a reminder for a \"due or due soon\" health maintenance. I have asked that he contact his primary care provider for follow-up on this health maintenance.  Date of Procedure: 12/9/2024  PROCEDURE NOTE: Right knee injection of Euflexxa    Consent was obtained from the patient. The correct site was identified after confirmation with the patient.  Following identification and confirmation of the correct site with the patient, the superolateral right knee was prepped in the normal sterile fashion.  A local anesthetic of 1% lidocaine without epinephrine was then administered to the local tissues.  Following, an injection of Euflexxa 20 mg viscosupplementation prefilled syringe was administered to the right knee.  The needle was then withdrawn and the injection site dressed with a sterile bandage at the conclusion.  The procedure was well tolerated by the patient.  No immediate adverse reactions were noted.  Post injection instructions were given.      Diagnosis: Right Knee Osteoarthritis

## 2024-12-16 ENCOUNTER — OFFICE VISIT (OUTPATIENT)
Age: 74
End: 2024-12-16
Payer: MEDICARE

## 2024-12-16 VITALS
HEIGHT: 70 IN | BODY MASS INDEX: 25.83 KG/M2 | OXYGEN SATURATION: 100 % | SYSTOLIC BLOOD PRESSURE: 161 MMHG | HEART RATE: 75 BPM | DIASTOLIC BLOOD PRESSURE: 71 MMHG

## 2024-12-16 DIAGNOSIS — M17.11 UNILATERAL PRIMARY OSTEOARTHRITIS, RIGHT KNEE: Primary | ICD-10-CM

## 2024-12-16 PROCEDURE — 20610 DRAIN/INJ JOINT/BURSA W/O US: CPT | Performed by: ORTHOPAEDIC SURGERY

## 2024-12-16 RX ORDER — HYALURONATE SODIUM 10 MG/ML
20 SYRINGE (ML) INTRAARTICULAR ONCE
Status: COMPLETED | OUTPATIENT
Start: 2024-12-16 | End: 2024-12-16

## 2024-12-16 RX ADMIN — Medication 20 MG: at 14:24

## 2024-12-16 ASSESSMENT — PATIENT HEALTH QUESTIONNAIRE - PHQ9
2. FEELING DOWN, DEPRESSED OR HOPELESS: NOT AT ALL
SUM OF ALL RESPONSES TO PHQ QUESTIONS 1-9: 0
SUM OF ALL RESPONSES TO PHQ9 QUESTIONS 1 & 2: 0
SUM OF ALL RESPONSES TO PHQ QUESTIONS 1-9: 0
1. LITTLE INTEREST OR PLEASURE IN DOING THINGS: NOT AT ALL
SUM OF ALL RESPONSES TO PHQ QUESTIONS 1-9: 0
SUM OF ALL RESPONSES TO PHQ QUESTIONS 1-9: 0

## 2024-12-16 NOTE — PROGRESS NOTES
BP elevated. Patient advised to  follow up with PCP and check BP twice a day at home.Identified pt with two pt identifiers (name and ). Reviewed chart in preparation for visit and have obtained necessary documentation.    Kenroy Rahman is a 74 y.o. male Knee Pain (Right knee injection )  .    Vitals:    24 1115 24 1116   BP: (!) 160/74 (!) 161/71   Site: Left Upper Arm Right Upper Arm   Position: Sitting Sitting   Cuff Size: Medium Adult Medium Adult   Pulse: 75    SpO2: 100%    Height: 1.778 m (5' 10\")           1. Have you been to the ER, urgent care clinic since your last visit?  Hospitalized since your last visit?  no     2. Have you seen or consulted any other health care providers outside of the [facility] since your last visit?  Include any pap smears or colon screening.  no

## 2024-12-23 ENCOUNTER — OFFICE VISIT (OUTPATIENT)
Age: 74
End: 2024-12-23
Payer: MEDICARE

## 2024-12-23 DIAGNOSIS — M17.11 UNILATERAL PRIMARY OSTEOARTHRITIS, RIGHT KNEE: Primary | ICD-10-CM

## 2024-12-23 PROCEDURE — 20610 DRAIN/INJ JOINT/BURSA W/O US: CPT | Performed by: ORTHOPAEDIC SURGERY

## 2024-12-23 RX ORDER — HYALURONATE SODIUM 10 MG/ML
20 SYRINGE (ML) INTRAARTICULAR ONCE
Status: COMPLETED | OUTPATIENT
Start: 2024-12-23 | End: 2024-12-23

## 2024-12-23 RX ADMIN — Medication 20 MG: at 11:23

## 2024-12-23 NOTE — PROGRESS NOTES
Date of Procedure: 12/23/2024  PROCEDURE NOTE: Right knee injection of Euflexxa    Consent was obtained from the patient. The correct site was identified after confirmation with the patient.  Following identification and confirmation of the correct site with the patient, the superolateral right knee was prepped in the normal sterile fashion.  A local anesthetic of 1% lidocaine without epinephrine was then administered to the local tissues.  Following, an injection of Euflexxa 20 mg viscosupplementation prefilled syringe was administered to the right knee.  The needle was then withdrawn and the injection site dressed with a sterile bandage at the conclusion.  The procedure was well tolerated by the patient.  No immediate adverse reactions were noted.  Post injection instructions were given.      Diagnosis: Right Knee Osteoarthritis

## 2025-02-26 RX ORDER — METOPROLOL SUCCINATE 50 MG/1
TABLET, EXTENDED RELEASE ORAL
Qty: 90 TABLET | Refills: 2 | Status: SHIPPED | OUTPATIENT
Start: 2025-02-26

## 2025-02-26 RX ORDER — LISINOPRIL 20 MG/1
TABLET ORAL
Qty: 135 TABLET | Refills: 2 | Status: SHIPPED | OUTPATIENT
Start: 2025-02-26

## 2025-02-26 RX ORDER — TAMSULOSIN HYDROCHLORIDE 0.4 MG/1
0.4 CAPSULE ORAL DAILY
Qty: 90 CAPSULE | Refills: 1 | Status: SHIPPED | OUTPATIENT
Start: 2025-02-26

## 2025-05-28 ENCOUNTER — OFFICE VISIT (OUTPATIENT)
Age: 75
End: 2025-05-28
Payer: MEDICARE

## 2025-05-28 VITALS
SYSTOLIC BLOOD PRESSURE: 153 MMHG | BODY MASS INDEX: 25.03 KG/M2 | OXYGEN SATURATION: 99 % | HEART RATE: 78 BPM | TEMPERATURE: 97.7 F | HEIGHT: 71 IN | WEIGHT: 178.8 LBS | DIASTOLIC BLOOD PRESSURE: 53 MMHG

## 2025-05-28 DIAGNOSIS — M65.331 TRIGGER MIDDLE FINGER OF RIGHT HAND: ICD-10-CM

## 2025-05-28 DIAGNOSIS — Z00.00 MEDICARE ANNUAL WELLNESS VISIT, SUBSEQUENT: Primary | ICD-10-CM

## 2025-05-28 DIAGNOSIS — I10 ESSENTIAL (PRIMARY) HYPERTENSION: ICD-10-CM

## 2025-05-28 DIAGNOSIS — Z87.891 HISTORY OF TOBACCO ABUSE: ICD-10-CM

## 2025-05-28 DIAGNOSIS — Z87.891 PERSONAL HISTORY OF TOBACCO USE: ICD-10-CM

## 2025-05-28 DIAGNOSIS — E78.2 MIXED HYPERLIPIDEMIA: ICD-10-CM

## 2025-05-28 DIAGNOSIS — Z12.5 PROSTATE CANCER SCREENING: ICD-10-CM

## 2025-05-28 PROCEDURE — 3078F DIAST BP <80 MM HG: CPT | Performed by: FAMILY MEDICINE

## 2025-05-28 PROCEDURE — 3077F SYST BP >= 140 MM HG: CPT | Performed by: FAMILY MEDICINE

## 2025-05-28 PROCEDURE — G0296 VISIT TO DETERM LDCT ELIG: HCPCS | Performed by: FAMILY MEDICINE

## 2025-05-28 PROCEDURE — G0439 PPPS, SUBSEQ VISIT: HCPCS | Performed by: FAMILY MEDICINE

## 2025-05-28 PROCEDURE — 1159F MED LIST DOCD IN RCRD: CPT | Performed by: FAMILY MEDICINE

## 2025-05-28 PROCEDURE — 1123F ACP DISCUSS/DSCN MKR DOCD: CPT | Performed by: FAMILY MEDICINE

## 2025-05-28 SDOH — ECONOMIC STABILITY: FOOD INSECURITY: WITHIN THE PAST 12 MONTHS, THE FOOD YOU BOUGHT JUST DIDN'T LAST AND YOU DIDN'T HAVE MONEY TO GET MORE.: NEVER TRUE

## 2025-05-28 SDOH — ECONOMIC STABILITY: FOOD INSECURITY: WITHIN THE PAST 12 MONTHS, YOU WORRIED THAT YOUR FOOD WOULD RUN OUT BEFORE YOU GOT MONEY TO BUY MORE.: NEVER TRUE

## 2025-05-28 ASSESSMENT — PATIENT HEALTH QUESTIONNAIRE - PHQ9
2. FEELING DOWN, DEPRESSED OR HOPELESS: NOT AT ALL
SUM OF ALL RESPONSES TO PHQ QUESTIONS 1-9: 0
1. LITTLE INTEREST OR PLEASURE IN DOING THINGS: NOT AT ALL
SUM OF ALL RESPONSES TO PHQ QUESTIONS 1-9: 0

## 2025-05-28 NOTE — PROGRESS NOTES
SUBJECTIVE:   Mr. Kenroy Rahman is a 75 y.o. male who is here for follow up of routine medical issues.          Pt presents today for MWV. Pt is not fasting.     Pt reports he has been dealing with Rt hand trigger finger in his middle finger, this has been an ongoing since 05/2024. He states he did follow up with Dr. Sukhdeep Whitehead (orthopedics) and received an injection which was helpful for awhile. He states in the last couple of months he has noticed his finger and hand is painful and tender. He describes a burning tingling sensation where he received the injection. He states the finger is sticking at times.  Pt mention he has noticed stiffness and aches in his BL hand.    Pt has been receiving steroid injection in his knee ever 6 months, he is followed by Dr. Stevo Jensen (orthopedics). He states he has some discomfort from time.    Pt is followed by Dr. Jac Caballero (urology) his last visit was on 05/15/2025.    HTN: Patient is compliant in taking lisinopril 20 mg 1.5 tablets and metoprolol succinate 50 mg daily. Their BP today was 153/53. Pt admits he hadn't  been checking his BP at home.    Pt explains he hasn't smoked in over 10 years.    PREVENTATIVE:  Pneumonia pending.  Tetanus booster due  Shingles due  RSV due  Colonoscopy 2022 UTD. Pt had 1 polyp every 5 years.    At this time, he is otherwise doing well and has brought no other complaints to my attention today.  For a list of the medical issues addressed today, see the assessment and plan below.    PMH:   Past Medical History:   Diagnosis Date    Arthritis     DJD (degenerative joint disease) of hip     Hypertension     Urinary incontinence      PSH:  has a past surgical history that includes Colonoscopy (N/A, 10/18/2022); Cataract removal (Left, 02/2023); and orthopedic surgery (Bilateral, 01/01/2004).    All: has no known allergies.   MEDS:   Current Outpatient Medications   Medication Sig    lisinopril (PRINIVIL;ZESTRIL) 20 MG tablet TAKE 1 AND

## 2025-05-28 NOTE — PROGRESS NOTES
Medicare Annual Wellness Visit    Kenroy Rahman is here for Medicare AWV and Hand Pain (RT hand trigger finger )    Assessment & Plan   Medicare annual wellness visit, subsequent  Trigger middle finger of right hand  -     [facility] - Sukhdeep Whitehead MD, Orthopaedic Surgery (elbow, hand, wrist), [place]  Essential (primary) hypertension  -     CBC with Auto Differential; Future  -     Comprehensive Metabolic Panel; Future  Mixed hyperlipidemia  -     Lipid Panel; Future  Prostate cancer screening  -     PSA Screening; Future  History of tobacco abuse  -     CT LUNG CANCER SCREENING (INITIAL/ANNUAL); Future  Personal history of tobacco use  -     LA VISIT TO DISCUSS LUNG CA SCREEN W LDCT  -     CT Lung Screen (Initial/Annual/Baseline); Future       Return in about 6 months (around 11/28/2025) for follow up HTN.     Subjective       Patient's complete Health Risk Assessment and screening values have been reviewed and are found in Flowsheets. The following problems were reviewed today and where indicated follow up appointments were made and/or referrals ordered.    Positive Risk Factor Screenings with Interventions:              Inactivity:  On average, how many days per week do you engage in moderate to strenuous exercise (like a brisk walk)?: 2 days (!) Abnormal  On average, how many minutes do you engage in exercise at this level?: 30 min  Interventions:  Recommendations: patient agrees to increase physical activity as follows: walking           Advanced Directives:  Do you have a Living Will?: (!) No    Intervention:  has NO advanced directive - not interested in additional information                           Objective   Vitals:    05/28/25 0817   BP: (!) 153/53   Pulse: 78   Temp: 97.7 °F (36.5 °C)   TempSrc: Temporal   SpO2: 99%   Weight: 81.1 kg (178 lb 12.8 oz)   Height: 1.803 m (5' 11\")      Body mass index is 24.94 kg/m².                  No Known Allergies  Prior to Visit Medications    Medication  This is a chronic health problem that is well controlled with current medications and lifestyle measures.  The patient denies chest pain, shortness of breath or dyspnea on exertion.

## 2025-05-28 NOTE — PATIENT INSTRUCTIONS
Learning About Being Active as an Older Adult  Why is being active important as you get older?     Being active is one of the best things you can do for your health. And it's never too late to start. Being active--or getting active, if you aren't already--has definite benefits. It can:  Give you more energy,  Keep your mind sharp.  Improve balance to reduce your risk of falls.  Help you manage chronic illness with fewer medicines.  No matter how old you are, how fit you are, or what health problems you have, there is a form of activity that will work for you. And the more physical activity you can do, the better your overall health will be.  What kinds of activity can help you stay healthy?  Being more active will make your daily activities easier. Physical activity includes planned exercise and things you do in daily life. There are four types of activity:  Aerobic.  Doing aerobic activity makes your heart and lungs strong.  Includes walking, dancing, and gardening.  Aim for at least 2½ hours spread throughout the week.  It improves your energy and can help you sleep better.  Muscle-strengthening.  This type of activity can help maintain muscle and strengthen bones.  Includes climbing stairs, using resistance bands, and lifting or carrying heavy loads.  Aim for at least twice a week.  It can help protect the knees and other joints.  Stretching.  Stretching gives you better range of motion in joints and muscles.  Includes upper arm stretches, calf stretches, and gentle yoga.  Aim for at least twice a week, preferably after your muscles are warmed up from other activities.  It can help you function better in daily life.  Balancing.  This helps you stay coordinated and have good posture.  Includes heel-to-toe walking, kaitlyn chi, and certain types of yoga.  Aim for at least 3 days a week.  It can reduce your risk of falling.  Even if you have a hard time meeting the recommendations, it's better to be more active

## 2025-05-30 RX ORDER — AMLODIPINE BESYLATE 10 MG/1
10 TABLET ORAL DAILY
Qty: 90 TABLET | Refills: 1 | Status: SHIPPED | OUTPATIENT
Start: 2025-05-30

## 2025-06-13 ENCOUNTER — HOSPITAL ENCOUNTER (OUTPATIENT)
Facility: HOSPITAL | Age: 75
Discharge: HOME | End: 2025-06-16
Attending: FAMILY MEDICINE
Payer: MEDICARE

## 2025-06-13 DIAGNOSIS — Z87.891 PERSONAL HISTORY OF TOBACCO USE: ICD-10-CM

## 2025-06-13 PROCEDURE — 71271 CT THORAX LUNG CANCER SCR C-: CPT

## 2025-06-18 ENCOUNTER — RESULTS FOLLOW-UP (OUTPATIENT)
Age: 75
End: 2025-06-18

## 2025-07-08 ENCOUNTER — HOSPITAL ENCOUNTER (OUTPATIENT)
Facility: HOSPITAL | Age: 75
Discharge: HOME | End: 2025-07-11

## 2025-07-08 DIAGNOSIS — E78.2 MIXED HYPERLIPIDEMIA: ICD-10-CM

## 2025-07-08 DIAGNOSIS — I10 ESSENTIAL (PRIMARY) HYPERTENSION: ICD-10-CM

## 2025-07-08 DIAGNOSIS — Z12.5 PROSTATE CANCER SCREENING: ICD-10-CM

## 2025-07-09 LAB
ALBUMIN SERPL-MCNC: 3.6 G/DL (ref 3.5–5)
ALBUMIN/GLOB SERPL: 1.3 (ref 1.1–2.2)
ALP SERPL-CCNC: 79 U/L (ref 45–117)
ALT SERPL-CCNC: 18 U/L (ref 12–78)
ANION GAP SERPL CALC-SCNC: 5 MMOL/L (ref 2–12)
AST SERPL-CCNC: 19 U/L (ref 15–37)
BASOPHILS # BLD: 0.07 K/UL (ref 0–0.1)
BASOPHILS NFR BLD: 1.2 % (ref 0–1)
BILIRUB SERPL-MCNC: 0.5 MG/DL (ref 0.2–1)
BUN SERPL-MCNC: 24 MG/DL (ref 6–20)
BUN/CREAT SERPL: 19 (ref 12–20)
CALCIUM SERPL-MCNC: 9.2 MG/DL (ref 8.5–10.1)
CHLORIDE SERPL-SCNC: 111 MMOL/L (ref 97–108)
CHOLEST SERPL-MCNC: 185 MG/DL
CO2 SERPL-SCNC: 23 MMOL/L (ref 21–32)
CREAT SERPL-MCNC: 1.24 MG/DL (ref 0.7–1.3)
DIFFERENTIAL METHOD BLD: ABNORMAL
EOSINOPHIL # BLD: 0.29 K/UL (ref 0–0.4)
EOSINOPHIL NFR BLD: 5.1 % (ref 0–7)
ERYTHROCYTE [DISTWIDTH] IN BLOOD BY AUTOMATED COUNT: 11.7 % (ref 11.5–14.5)
GLOBULIN SER CALC-MCNC: 2.8 G/DL (ref 2–4)
GLUCOSE SERPL-MCNC: 121 MG/DL (ref 65–100)
HCT VFR BLD AUTO: 35.7 % (ref 36.6–50.3)
HDLC SERPL-MCNC: 61 MG/DL
HDLC SERPL: 3 (ref 0–5)
HGB BLD-MCNC: 11.7 G/DL (ref 12.1–17)
IMM GRANULOCYTES # BLD AUTO: 0.02 K/UL (ref 0–0.04)
IMM GRANULOCYTES NFR BLD AUTO: 0.4 % (ref 0–0.5)
LDLC SERPL CALC-MCNC: 102 MG/DL (ref 0–100)
LYMPHOCYTES # BLD: 2.35 K/UL (ref 0.8–3.5)
LYMPHOCYTES NFR BLD: 41.5 % (ref 12–49)
MCH RBC QN AUTO: 31.5 PG (ref 26–34)
MCHC RBC AUTO-ENTMCNC: 32.8 G/DL (ref 30–36.5)
MCV RBC AUTO: 96.2 FL (ref 80–99)
MONOCYTES # BLD: 0.63 K/UL (ref 0–1)
MONOCYTES NFR BLD: 11.1 % (ref 5–13)
NEUTS SEG # BLD: 2.3 K/UL (ref 1.8–8)
NEUTS SEG NFR BLD: 40.7 % (ref 32–75)
NRBC # BLD: 0 K/UL (ref 0–0.01)
NRBC BLD-RTO: 0 PER 100 WBC
PLATELET # BLD AUTO: 201 K/UL (ref 150–400)
PMV BLD AUTO: 11.8 FL (ref 8.9–12.9)
POTASSIUM SERPL-SCNC: 4.2 MMOL/L (ref 3.5–5.1)
PROT SERPL-MCNC: 6.4 G/DL (ref 6.4–8.2)
PSA SERPL-MCNC: 1.9 NG/ML (ref 0.01–4)
RBC # BLD AUTO: 3.71 M/UL (ref 4.1–5.7)
SODIUM SERPL-SCNC: 139 MMOL/L (ref 136–145)
TRIGL SERPL-MCNC: 110 MG/DL
VLDLC SERPL CALC-MCNC: 22 MG/DL
WBC # BLD AUTO: 5.7 K/UL (ref 4.1–11.1)

## 2025-07-18 ENCOUNTER — TELEPHONE (OUTPATIENT)
Age: 75
End: 2025-07-18

## 2025-07-18 NOTE — TELEPHONE ENCOUNTER
Patient called to request a Euflexxa injection.  Last injection was 12/23/2024.  Please return call at [phone] when appropriate to schedule. Thank you!

## 2025-07-18 NOTE — TELEPHONE ENCOUNTER
Identified pt with two pt identifiers (name and ). Reviewed chart in preparation for visit and have obtained necessary documentation.  Spoke with patient informing that he does have an auth of file but he will need a new auth since it was only for three injections. Informed  him that Danilo will reach out to him once he gets the Auth  to schedule

## 2025-07-21 ENCOUNTER — OFFICE VISIT (OUTPATIENT)
Age: 75
End: 2025-07-21
Payer: MEDICARE

## 2025-07-21 DIAGNOSIS — M17.11 UNILATERAL PRIMARY OSTEOARTHRITIS, RIGHT KNEE: Primary | ICD-10-CM

## 2025-07-21 PROCEDURE — 99213 OFFICE O/P EST LOW 20 MIN: CPT | Performed by: ORTHOPAEDIC SURGERY

## 2025-07-21 PROCEDURE — 1125F AMNT PAIN NOTED PAIN PRSNT: CPT | Performed by: ORTHOPAEDIC SURGERY

## 2025-07-21 PROCEDURE — 1036F TOBACCO NON-USER: CPT | Performed by: ORTHOPAEDIC SURGERY

## 2025-07-21 PROCEDURE — 1123F ACP DISCUSS/DSCN MKR DOCD: CPT | Performed by: ORTHOPAEDIC SURGERY

## 2025-07-21 PROCEDURE — G8420 CALC BMI NORM PARAMETERS: HCPCS | Performed by: ORTHOPAEDIC SURGERY

## 2025-07-21 PROCEDURE — G8427 DOCREV CUR MEDS BY ELIG CLIN: HCPCS | Performed by: ORTHOPAEDIC SURGERY

## 2025-07-21 PROCEDURE — 1159F MED LIST DOCD IN RCRD: CPT | Performed by: ORTHOPAEDIC SURGERY

## 2025-07-21 ASSESSMENT — PATIENT HEALTH QUESTIONNAIRE - PHQ9
1. LITTLE INTEREST OR PLEASURE IN DOING THINGS: NOT AT ALL
SUM OF ALL RESPONSES TO PHQ QUESTIONS 1-9: 0
2. FEELING DOWN, DEPRESSED OR HOPELESS: NOT AT ALL

## 2025-07-21 NOTE — PROGRESS NOTES
7/21/2025      CC: right knee pain    HPI:      This is a 75 y.o. year old male who presents for a follow up visit.  The patient was last seen and diagnosed with right knee osteoarthritis.   The patient's treatments since the most recent visit have comprised of injections.   The patient has had 8 months relief of the chief complaint.        PMH:  Past Medical History:   Diagnosis Date    Arthritis     DJD (degenerative joint disease) of hip     Hypertension     Urinary incontinence        PSxHx:  Past Surgical History:   Procedure Laterality Date    CATARACT REMOVAL Left 02/2023    COLONOSCOPY N/A 10/18/2022    COLONOSCOPY performed by Scott Welsh MD at [facility] ENDOSCOPY    ORTHOPEDIC SURGERY Bilateral 01/01/2004    THR       Meds:    Current Outpatient Medications:     amLODIPine (NORVASC) 10 MG tablet, TAKE 1 TABLET BY MOUTH EVERY DAY, Disp: 90 tablet, Rfl: 1    lisinopril (PRINIVIL;ZESTRIL) 20 MG tablet, TAKE 1 AND 1/2 TABLETS BY MOUTH DAILY, Disp: 135 tablet, Rfl: 2    tamsulosin (FLOMAX) 0.4 MG capsule, TAKE 1 CAPSULE BY MOUTH DAILY, Disp: 90 capsule, Rfl: 1    metoprolol succinate (TOPROL XL) 50 MG extended release tablet, TAKE 1 TABLET BY MOUTH DAILY, Disp: 90 tablet, Rfl: 2    Multiple Vitamins-Minerals (MULTIVITAMIN ADULTS PO), Take by mouth Ronit Natural for muscle cramps, Disp: , Rfl:     Omega-3 Fatty Acids (FISH OIL) 300 MG CAPS, 1 capsule, Disp: , Rfl:     TOLTERODINE TARTRATE ER PO, Take by mouth, Disp: , Rfl:     cyanocobalamin 100 MCG tablet, Take by mouth, Disp: , Rfl:     aspirin 81 MG EC tablet, Take 1 tablet by mouth Every Day, Disp: , Rfl:     Ascorbic Acid 100 MG CHEW, Take 1 tablet by mouth Every Day, Disp: , Rfl:     acetaminophen (TYLENOL) 650 MG extended release tablet, Take 1 tablet by mouth as needed, Disp: , Rfl:     latanoprost (XALATAN) 0.005 % ophthalmic solution, Apply 1 drop to eye, Disp: , Rfl:     amoxicillin (AMOXIL) 500 MG tablet, TAKE 1 TABLET BY MOUTH THREE TIMES DAILY

## 2025-07-21 NOTE — PROGRESS NOTES
Identified pt with two pt identifiers (name and ). Reviewed chart in preparation for visit and have obtained necessary documentation.    Kenroy Rahman is a 75 y.o. male Follow-up (Right Knee )  .    There were no vitals filed for this visit.       1. Have you been to the ER, urgent care clinic since your last visit?  Hospitalized since your last visit?  no     2. Have you seen or consulted any other health care providers outside of the [facility] since your last visit?  Include any pap smears or colon screening.  no

## 2025-07-22 ENCOUNTER — TELEPHONE (OUTPATIENT)
Age: 75
End: 2025-07-22

## 2025-07-22 NOTE — TELEPHONE ENCOUNTER
----- Message from Dr. Stevo Naranjo, DO sent at 7/21/2025  2:24 PM EDT -----  Visco right knee please

## 2025-07-22 NOTE — TELEPHONE ENCOUNTER
Spoke with Rajani from [employer] and they stated that the prior auth that was submitted for gel injection will  at the end of the year and no new prior auth is needed until the end of the year. She stated that she will send out another approval letter that way we can have something up to date for our records. The auth approval ref #: [id number]. Will scan paperwork into chart once received.

## 2025-08-07 ENCOUNTER — OFFICE VISIT (OUTPATIENT)
Age: 75
End: 2025-08-07

## 2025-08-07 DIAGNOSIS — M17.11 UNILATERAL PRIMARY OSTEOARTHRITIS, RIGHT KNEE: Primary | ICD-10-CM

## 2025-08-07 ASSESSMENT — PATIENT HEALTH QUESTIONNAIRE - PHQ9
1. LITTLE INTEREST OR PLEASURE IN DOING THINGS: NOT AT ALL
2. FEELING DOWN, DEPRESSED OR HOPELESS: NOT AT ALL
SUM OF ALL RESPONSES TO PHQ QUESTIONS 1-9: 0

## 2025-08-18 ENCOUNTER — OFFICE VISIT (OUTPATIENT)
Age: 75
End: 2025-08-18
Payer: MEDICARE

## 2025-08-18 DIAGNOSIS — M17.11 UNILATERAL PRIMARY OSTEOARTHRITIS, RIGHT KNEE: Primary | ICD-10-CM

## 2025-08-18 PROCEDURE — 20610 DRAIN/INJ JOINT/BURSA W/O US: CPT | Performed by: ORTHOPAEDIC SURGERY

## 2025-08-18 ASSESSMENT — PATIENT HEALTH QUESTIONNAIRE - PHQ9
SUM OF ALL RESPONSES TO PHQ QUESTIONS 1-9: 0
2. FEELING DOWN, DEPRESSED OR HOPELESS: NOT AT ALL
1. LITTLE INTEREST OR PLEASURE IN DOING THINGS: NOT AT ALL

## 2025-08-25 ENCOUNTER — OFFICE VISIT (OUTPATIENT)
Age: 75
End: 2025-08-25
Payer: MEDICARE

## 2025-08-25 DIAGNOSIS — M17.11 UNILATERAL PRIMARY OSTEOARTHRITIS, RIGHT KNEE: Primary | ICD-10-CM

## 2025-08-25 PROCEDURE — 20610 DRAIN/INJ JOINT/BURSA W/O US: CPT | Performed by: ORTHOPAEDIC SURGERY

## 2025-08-25 ASSESSMENT — PATIENT HEALTH QUESTIONNAIRE - PHQ9
SUM OF ALL RESPONSES TO PHQ QUESTIONS 1-9: 0
1. LITTLE INTEREST OR PLEASURE IN DOING THINGS: NOT AT ALL
SUM OF ALL RESPONSES TO PHQ QUESTIONS 1-9: 0
2. FEELING DOWN, DEPRESSED OR HOPELESS: NOT AT ALL

## 2025-08-30 RX ORDER — TAMSULOSIN HYDROCHLORIDE 0.4 MG/1
0.4 CAPSULE ORAL DAILY
Qty: 90 CAPSULE | Refills: 3 | Status: SHIPPED | OUTPATIENT
Start: 2025-08-30

## 2025-09-05 RX ORDER — TAMSULOSIN HYDROCHLORIDE 0.4 MG/1
0.4 CAPSULE ORAL DAILY
Qty: 90 CAPSULE | Refills: 3 | Status: SHIPPED | OUTPATIENT
Start: 2025-09-05

## (undated) DEVICE — Device

## (undated) DEVICE — 1200 GUARD II KIT W/5MM TUBE W/O VAC TUBE: Brand: GUARDIAN

## (undated) DEVICE — Z DISCONTINUED PER MEDLINE LINE GAS SAMPLING O2/CO2 LNG AD 13 FT NSL W/ TBNG FILTERLINE

## (undated) DEVICE — SYR 10ML LUER LOK 1/5ML GRAD --

## (undated) DEVICE — CONTAINER SPEC 20 ML LID NEUT BUFF FORMALIN 10 % POLYPR STS

## (undated) DEVICE — NEONATAL-ADULT SPO2 SENSOR: Brand: NELLCOR

## (undated) DEVICE — CATH IV AUTOGRD BC PNK 20GA 25 -- INSYTE

## (undated) DEVICE — SNARE ENDOSCP M L240CM W27MM SHTH DIA2.4MM CHN 2.8MM OVL

## (undated) DEVICE — SOLIDIFIER FLD 2OZ 1500CC N DISINF IN BTL DISP SAFESORB

## (undated) DEVICE — HYPODERMIC SAFETY NEEDLE: Brand: MAGELLAN

## (undated) DEVICE — TOWEL 4 PLY TISS 19X30 SUE WHT

## (undated) DEVICE — TRAP,MUCUS SPECIMEN, 80CC: Brand: MEDLINE

## (undated) DEVICE — BASIN EMSIS 16OZ GRAPHITE PLAS KID SHP MOLD GRAD FOR ORAL

## (undated) DEVICE — SYR 3ML LL TIP 1/10ML GRAD --

## (undated) DEVICE — ELECTRODE,RADIOTRANSLUCENT,FOAM,5PK: Brand: MEDLINE

## (undated) DEVICE — STRAINER URIN CALC RNL MSH -- CONVERT TO ITEM 357634

## (undated) DEVICE — NON-REM POLYHESIVE PATIENT RETURN ELECTRODE: Brand: VALLEYLAB

## (undated) DEVICE — SET ADMIN 16ML TBNG L100IN 2 Y INJ SITE IV PIGGY BK DISP